# Patient Record
Sex: FEMALE | Race: BLACK OR AFRICAN AMERICAN | Employment: UNEMPLOYED | URBAN - NONMETROPOLITAN AREA
[De-identification: names, ages, dates, MRNs, and addresses within clinical notes are randomized per-mention and may not be internally consistent; named-entity substitution may affect disease eponyms.]

---
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
## 2020-06-29 ENCOUNTER — HOSPITAL ENCOUNTER (EMERGENCY)
Age: 20
Discharge: HOME | End: 2020-06-29
Attending: EMERGENCY MEDICINE
Payer: COMMERCIAL

## 2020-06-29 VITALS
DIASTOLIC BLOOD PRESSURE: 69 MMHG | TEMPERATURE: 98.5 F | OXYGEN SATURATION: 99 % | BODY MASS INDEX: 45 KG/M2 | WEIGHT: 280 LBS | RESPIRATION RATE: 16 BRPM | HEART RATE: 77 BPM | SYSTOLIC BLOOD PRESSURE: 109 MMHG | HEIGHT: 66 IN

## 2020-06-29 LAB
ALBUMIN SERPL-MCNC: 4.3 G/DL (ref 3.5–5.1)
ALP BLD-CCNC: 37 U/L (ref 38–126)
ALT SERPL-CCNC: 23 U/L (ref 11–66)
AMPHETAMINE+METHAMPHETAMINE URINE SCREEN: NEGATIVE
ANION GAP SERPL CALCULATED.3IONS-SCNC: 13 MEQ/L (ref 8–16)
AST SERPL-CCNC: 17 U/L (ref 5–40)
BACTERIA: ABNORMAL /HPF
BARBITURATE QUANTITATIVE URINE: NEGATIVE
BASOPHILS # BLD: 0.2 %
BASOPHILS ABSOLUTE: 0 THOU/MM3 (ref 0–0.1)
BENZODIAZEPINE QUANTITATIVE URINE: NEGATIVE
BILIRUB SERPL-MCNC: 0.2 MG/DL (ref 0.3–1.2)
BILIRUBIN DIRECT: < 0.2 MG/DL (ref 0–0.3)
BILIRUBIN URINE: NEGATIVE
BLOOD, URINE: NEGATIVE
BUN BLDV-MCNC: 12 MG/DL (ref 7–22)
CALCIUM SERPL-MCNC: 9.3 MG/DL (ref 8.5–10.5)
CANNABINOID QUANTITATIVE URINE: POSITIVE
CASTS 2: ABNORMAL /LPF
CASTS UA: ABNORMAL /LPF
CHARACTER, URINE: CLEAR
CHLORIDE BLD-SCNC: 107 MEQ/L (ref 98–111)
CO2: 21 MEQ/L (ref 23–33)
COCAINE METABOLITE QUANTITATIVE URINE: NEGATIVE
COLOR: YELLOW
CREAT SERPL-MCNC: 0.8 MG/DL (ref 0.4–1.2)
CRYSTALS, UA: ABNORMAL
EOSINOPHIL # BLD: 1 %
EOSINOPHILS ABSOLUTE: 0.1 THOU/MM3 (ref 0–0.4)
EPITHELIAL CELLS, UA: ABNORMAL /HPF
ERYTHROCYTE [DISTWIDTH] IN BLOOD BY AUTOMATED COUNT: 12.2 % (ref 11.5–14.5)
ERYTHROCYTE [DISTWIDTH] IN BLOOD BY AUTOMATED COUNT: 42.9 FL (ref 35–45)
ETHYL ALCOHOL, SERUM: < 0.01 %
GFR SERPL CREATININE-BSD FRML MDRD: > 90 ML/MIN/1.73M2
GLUCOSE BLD-MCNC: 74 MG/DL (ref 70–108)
GLUCOSE URINE: NEGATIVE MG/DL
HCG,BETA SUBUNIT,QUAL,SERUM: 5093 MIU/ML (ref 0–5)
HCT VFR BLD CALC: 40.3 % (ref 37–47)
HEMOGLOBIN: 12.5 GM/DL (ref 12–16)
IMMATURE GRANS (ABS): 0.03 THOU/MM3 (ref 0–0.07)
IMMATURE GRANULOCYTES: 0.3 %
KETONES, URINE: NEGATIVE
LEUKOCYTE ESTERASE, URINE: ABNORMAL
LIPASE: 15.6 U/L (ref 5.6–51.3)
LYMPHOCYTES # BLD: 31.2 %
LYMPHOCYTES ABSOLUTE: 3.3 THOU/MM3 (ref 1–4.8)
MAGNESIUM: 2.1 MG/DL (ref 1.6–2.4)
MCH RBC QN AUTO: 29.8 PG (ref 26–33)
MCHC RBC AUTO-ENTMCNC: 31 GM/DL (ref 32.2–35.5)
MCV RBC AUTO: 96.2 FL (ref 81–99)
MISCELLANEOUS 2: ABNORMAL
MONOCYTES # BLD: 12.4 %
MONOCYTES ABSOLUTE: 1.3 THOU/MM3 (ref 0.4–1.3)
NITRITE, URINE: NEGATIVE
NUCLEATED RED BLOOD CELLS: 0 /100 WBC
OPIATES, URINE: NEGATIVE
OSMOLALITY CALCULATION: 279.7 MOSMOL/KG (ref 275–300)
OXYCODONE: NEGATIVE
PH UA: 6 (ref 5–9)
PHENCYCLIDINE QUANTITATIVE URINE: NEGATIVE
PLATELET # BLD: 272 THOU/MM3 (ref 130–400)
PMV BLD AUTO: 10.3 FL (ref 9.4–12.4)
POTASSIUM SERPL-SCNC: 3.5 MEQ/L (ref 3.5–5.2)
PREGNANCY, SERUM: POSITIVE
PROTEIN UA: NEGATIVE
RBC # BLD: 4.19 MILL/MM3 (ref 4.2–5.4)
RBC URINE: ABNORMAL /HPF
RENAL EPITHELIAL, UA: ABNORMAL
SEG NEUTROPHILS: 54.9 %
SEGMENTED NEUTROPHILS ABSOLUTE COUNT: 5.8 THOU/MM3 (ref 1.8–7.7)
SODIUM BLD-SCNC: 141 MEQ/L (ref 135–145)
SPECIFIC GRAVITY, URINE: 1.03 (ref 1–1.03)
TOTAL PROTEIN: 7.1 G/DL (ref 6.1–8)
UROBILINOGEN, URINE: 0.2 EU/DL (ref 0–1)
WBC # BLD: 10.5 THOU/MM3 (ref 4.8–10.8)
WBC UA: ABNORMAL /HPF
YEAST: ABNORMAL

## 2020-06-29 PROCEDURE — 84702 CHORIONIC GONADOTROPIN TEST: CPT

## 2020-06-29 PROCEDURE — 80307 DRUG TEST PRSMV CHEM ANLYZR: CPT

## 2020-06-29 PROCEDURE — 83735 ASSAY OF MAGNESIUM: CPT

## 2020-06-29 PROCEDURE — G0480 DRUG TEST DEF 1-7 CLASSES: HCPCS

## 2020-06-29 PROCEDURE — 81001 URINALYSIS AUTO W/SCOPE: CPT

## 2020-06-29 PROCEDURE — 85025 COMPLETE CBC W/AUTO DIFF WBC: CPT

## 2020-06-29 PROCEDURE — 99283 EMERGENCY DEPT VISIT LOW MDM: CPT

## 2020-06-29 PROCEDURE — 84703 CHORIONIC GONADOTROPIN ASSAY: CPT

## 2020-06-29 PROCEDURE — 83690 ASSAY OF LIPASE: CPT

## 2020-06-29 PROCEDURE — 36415 COLL VENOUS BLD VENIPUNCTURE: CPT

## 2020-06-29 PROCEDURE — 82248 BILIRUBIN DIRECT: CPT

## 2020-06-29 PROCEDURE — 80053 COMPREHEN METABOLIC PANEL: CPT

## 2020-06-29 RX ORDER — CEPHALEXIN 500 MG/1
500 CAPSULE ORAL 4 TIMES DAILY
Qty: 28 CAPSULE | Refills: 0 | Status: SHIPPED | OUTPATIENT
Start: 2020-06-29 | End: 2020-07-06

## 2020-06-29 RX ORDER — ONDANSETRON 4 MG/1
4 TABLET, ORALLY DISINTEGRATING ORAL ONCE
Status: DISCONTINUED | OUTPATIENT
Start: 2020-06-29 | End: 2020-06-30 | Stop reason: HOSPADM

## 2020-06-29 RX ORDER — PANTOPRAZOLE SODIUM 40 MG/1
40 TABLET, DELAYED RELEASE ORAL ONCE
Status: DISCONTINUED | OUTPATIENT
Start: 2020-06-29 | End: 2020-06-30 | Stop reason: HOSPADM

## 2020-06-29 RX ORDER — CEPHALEXIN 250 MG/1
500 CAPSULE ORAL ONCE
Status: DISCONTINUED | OUTPATIENT
Start: 2020-06-29 | End: 2020-06-30 | Stop reason: HOSPADM

## 2020-06-29 ASSESSMENT — ENCOUNTER SYMPTOMS
RHINORRHEA: 0
DIARRHEA: 0
EYE DISCHARGE: 0
EYE REDNESS: 0
PHOTOPHOBIA: 0
SINUS PRESSURE: 0
TROUBLE SWALLOWING: 0
ABDOMINAL DISTENTION: 0
ABDOMINAL PAIN: 1
WHEEZING: 0
EYE ITCHING: 0
SORE THROAT: 0
EYE PAIN: 0
VOICE CHANGE: 0
CONSTIPATION: 0
CHOKING: 0
BACK PAIN: 0
NAUSEA: 1
VOMITING: 0
BLOOD IN STOOL: 0
COUGH: 0
SHORTNESS OF BREATH: 0
CHEST TIGHTNESS: 0

## 2020-06-29 ASSESSMENT — PAIN SCALES - GENERAL
PAINLEVEL_OUTOF10: 8
PAINLEVEL_OUTOF10: 6

## 2020-06-29 ASSESSMENT — PAIN DESCRIPTION - LOCATION: LOCATION: ABDOMEN

## 2020-06-29 ASSESSMENT — PAIN DESCRIPTION - PAIN TYPE: TYPE: ACUTE PAIN

## 2020-06-30 ASSESSMENT — ENCOUNTER SYMPTOMS
CHOKING: 0
TROUBLE SWALLOWING: 0
CHEST TIGHTNESS: 0
NAUSEA: 1
BACK PAIN: 0
DIARRHEA: 0
ABDOMINAL DISTENTION: 0
BLOOD IN STOOL: 0
EYE DISCHARGE: 0
VOICE CHANGE: 0
EYE ITCHING: 0
PHOTOPHOBIA: 0
VOMITING: 0
EYE PAIN: 0
ABDOMINAL PAIN: 1
SORE THROAT: 0
EYE REDNESS: 0
WHEEZING: 0
RHINORRHEA: 0
CONSTIPATION: 0
COUGH: 0
SINUS PRESSURE: 0
SHORTNESS OF BREATH: 0

## 2020-06-30 NOTE — ED PROVIDER NOTES
[address]          CHIEF COMPLAINT       Chief Complaint   Patient presents with    Abdominal Pain    Nausea       Nurses Notes reviewed and I agree except as noted inthe HPI. HISTORY OF PRESENT ILLNESS    Quita Lennon is a 21 y.o. female who presents to the Emergency Department for the evaluation of abdominal pain and nausea. Patient reports abdominal cramping and intermittent \"tightness\" that she rates as a 6/10 in severity. She is also experiencing nausea, urinary frequency, and dark colored urine. She denies emesis, vaginal discharge, hematuria, or hematochezia. Patient notes that she is late for her menstrual period and that there is a chance of pregnancy. Past medical history of polycystic ovary disease. No further complaints at initial encounter. The HPI was provided by the patient. REVIEW OF SYSTEMS     Review of Systems   Constitutional: Negative for activity change, appetite change, diaphoresis, fatigue and unexpected weight change. HENT: Negative for congestion, ear discharge, ear pain, hearing loss, rhinorrhea, sinus pressure, sore throat, trouble swallowing and voice change. Eyes: Negative for photophobia, pain, discharge, redness and itching. Respiratory: Negative for cough, choking, chest tightness, shortness of breath and wheezing. Cardiovascular: Negative for chest pain, palpitations and leg swelling. Gastrointestinal: Positive for abdominal pain and nausea. Negative for abdominal distention, blood in stool, constipation, diarrhea and vomiting. Endocrine: Negative for polydipsia, polyphagia and polyuria. Genitourinary: Positive for frequency. Negative for decreased urine volume, difficulty urinating, dysuria, enuresis, hematuria and urgency. Dark colored urine   Musculoskeletal: Negative for arthralgias, back pain, gait problem, myalgias, neck pain and neck stiffness. Skin: Negative for pallor and rash. External ear normal.      Left Ear: External ear normal.      Nose: Nose normal.      Mouth/Throat:      Pharynx: No oropharyngeal exudate. Eyes:      General: No scleral icterus. Right eye: No discharge. Left eye: No discharge. Conjunctiva/sclera: Conjunctivae normal.      Pupils: Pupils are equal, round, and reactive to light. Neck:      Musculoskeletal: Normal range of motion and neck supple. Thyroid: No thyromegaly. Vascular: No JVD. Trachea: No tracheal deviation. Cardiovascular:      Rate and Rhythm: Normal rate and regular rhythm. Heart sounds: Normal heart sounds, S1 normal and S2 normal. No murmur. No friction rub. No gallop. Pulmonary:      Effort: Pulmonary effort is normal.      Breath sounds: Normal breath sounds. No stridor. No wheezing, rhonchi or rales. Chest:      Chest wall: No tenderness. Abdominal:      General: Bowel sounds are normal. There is no distension. Palpations: Abdomen is soft. There is no mass. Tenderness: There is no abdominal tenderness. There is no guarding or rebound. Hernia: No hernia is present. Musculoskeletal: Normal range of motion. General: No tenderness. Lymphadenopathy:      Cervical: No cervical adenopathy. Skin:     General: Skin is warm and dry. Findings: No bruising, ecchymosis, lesion or rash. Neurological:      Mental Status: She is alert and oriented to person, place, and time. Cranial Nerves: No cranial nerve deficit. Coordination: Coordination normal.      Deep Tendon Reflexes: Reflexes are normal and symmetric. Psychiatric:         Speech: Speech normal.         Behavior: Behavior normal.         Thought Content:  Thought content normal.         Judgment: Judgment normal.         DIFFERENTIAL DIAGNOSIS:   As discussed extensively at bedside with the patient, including but not limited to pregnancy, UTI, viral illness, gastritis     RESULTS     EKG: All EKG's are interpreted by the Emergency Department Physician who either signs or Co-signs this chart in the absence of acardiologist.    None     RADIOLOGY: non-plain film images(s) such as CT, Ultrasound and MRI are read by the radiologist.    No orders to display       LABS:   Labs Reviewed   CBC WITH AUTO DIFFERENTIAL - Abnormal; Notable for the following components:       Result Value    RBC 4.19 (*)     MCHC 31.0 (*)     All other components within normal limits   BASIC METABOLIC PANEL - Abnormal; Notable for the following components:    CO2 21 (*)     All other components within normal limits   HEPATIC FUNCTION PANEL - Abnormal; Notable for the following components: Total Bilirubin 0.2 (*)     Alkaline Phosphatase 37 (*)     All other components within normal limits   URINE WITH REFLEXED MICRO - Abnormal; Notable for the following components:    Leukocyte Esterase, Urine TRACE (*)     All other components within normal limits   HCG, QUANTITATIVE, PREGNANCY - Abnormal; Notable for the following components:    hCG,Beta Subunit,Qual,Serum 5093.0 (*)     All other components within normal limits   LIPASE   MAGNESIUM   HCG, SERUM, QUALITATIVE   ETHANOL   URINE DRUG SCREEN   ANION GAP   GLOMERULAR FILTRATION RATE, ESTIMATED   OSMOLALITY       EMERGENCY DEPARTMENT COURSE:   Vitals:    Vitals:    06/29/20 2136 06/29/20 2230   BP: (!) 154/84 109/69   Pulse: 89 77   Resp: 18 16   Temp: 98.5 °F (36.9 °C)    TempSrc: Oral    SpO2: 99% 99%   Weight: 280 lb (127 kg)    Height: 5' 6\" (1.676 m)      9:38 PM EDT: The patient was seen and evaluated. Appropriate orders placed. Patient has very vague complaints today. There is no specific area pain. She has some nausea without vomiting. She is hemodynamically stable. Her urine today shows she has a urinary tract infection. Lab work came back. Apparently patient is now pregnant. Patient denies any vaginal bleeding or discharge. She has never seen an OB/GYN before.   She is never

## 2020-06-30 NOTE — ED NOTES
Pt resting on cot with significant other at bedside. Pt urine sample obtained and sent to lab at this time.       Demetrius ESCALONA RN  06/29/20 6968

## 2020-06-30 NOTE — ED NOTES
PT denies any need for medication at this time. States pain is tolerable and no nausea at this time.       Jayleen Soto RN  06/29/20 2113

## 2020-10-06 ENCOUNTER — HOSPITAL ENCOUNTER (EMERGENCY)
Age: 20
Discharge: HOME | End: 2020-10-06
Payer: COMMERCIAL

## 2020-10-06 VITALS
HEIGHT: 66 IN | HEART RATE: 95 BPM | DIASTOLIC BLOOD PRESSURE: 94 MMHG | BODY MASS INDEX: 47.09 KG/M2 | SYSTOLIC BLOOD PRESSURE: 135 MMHG | OXYGEN SATURATION: 98 % | TEMPERATURE: 97.5 F | WEIGHT: 293 LBS | RESPIRATION RATE: 16 BRPM

## 2020-10-06 LAB
GROUP A STREP CULTURE, REFLEX: NEGATIVE
REFLEX THROAT C + S: NORMAL

## 2020-10-06 PROCEDURE — 87880 STREP A ASSAY W/OPTIC: CPT

## 2020-10-06 PROCEDURE — 99213 OFFICE O/P EST LOW 20 MIN: CPT

## 2020-10-06 PROCEDURE — 99202 OFFICE O/P NEW SF 15 MIN: CPT | Performed by: NURSE PRACTITIONER

## 2020-10-06 PROCEDURE — 87070 CULTURE OTHR SPECIMN AEROBIC: CPT

## 2020-10-06 ASSESSMENT — PAIN DESCRIPTION - DESCRIPTORS: DESCRIPTORS: DISCOMFORT

## 2020-10-06 ASSESSMENT — PAIN DESCRIPTION - LOCATION: LOCATION: THROAT

## 2020-10-06 ASSESSMENT — PAIN SCALES - WONG BAKER: WONGBAKER_NUMERICALRESPONSE: 6

## 2020-10-06 ASSESSMENT — PAIN - FUNCTIONAL ASSESSMENT: PAIN_FUNCTIONAL_ASSESSMENT: PREVENTS OR INTERFERES SOME ACTIVE ACTIVITIES AND ADLS

## 2020-10-06 ASSESSMENT — ENCOUNTER SYMPTOMS
RHINORRHEA: 0
COUGH: 0
NAUSEA: 0
SHORTNESS OF BREATH: 0

## 2020-10-06 ASSESSMENT — PAIN SCALES - GENERAL: PAINLEVEL_OUTOF10: 6

## 2020-10-06 ASSESSMENT — PAIN DESCRIPTION - PAIN TYPE: TYPE: ACUTE PAIN

## 2020-10-06 NOTE — ED PROVIDER NOTES
[facility]       CHIEF COMPLAINT       Chief Complaint   Patient presents with    Pharyngitis     Nurses Notes reviewed and I agree except as noted in the HPI. HISTORY OF PRESENT ILLNESS   Garret Romero is a 21 y.o. female who presents with complaints of red bumps on her tongue started 5 days ago. She states a sore throat began yesterday. The history is provided by the patient. Pharyngitis   Location:  Generalized  Quality:  Aching and sore  Severity:  Mild  Onset quality:  Sudden  Duration:  1 day  Timing:  Constant  Progression:  Unchanged  Chronicity:  New  Relieved by:  None tried  Worsened by:  Nothing  Ineffective treatments:  None tried  Associated symptoms: no chest pain, no chills, no cough, no fever, no headaches, no postnasal drip, no rhinorrhea and no shortness of breath    Risk factors: no exposure to strep and no sick contacts        REVIEW OF SYSTEMS     Review of Systems   Constitutional: Negative for chills and fever. HENT: Negative for postnasal drip and rhinorrhea. Respiratory: Negative for cough and shortness of breath. Cardiovascular: Negative for chest pain. Gastrointestinal: Negative for nausea. Musculoskeletal: Negative for arthralgias. Neurological: Negative for headaches. PAST MEDICAL HISTORY         Diagnosis Date    Polycystic ovary disease        SURGICALHISTORY     Patient  has a past surgical history that includes Tonsillectomy and adenoidectomy and cyst removal (Left). CURRENT MEDICATIONS       Previous Medications    DICLOFENAC (VOLTAREN) 75 MG EC TABLET    Take 1 tablet by mouth 2 times daily    ONDANSETRON (ZOFRAN) 4 MG TABLET    take 1 tablet by mouth every 12 hours if needed for nausea       ALLERGIES     Patient is has No Known Allergies. Patients   There is no immunization history on file for this patient. FAMILY HISTORY     Patient's family history is not on file.     SOCIAL HISTORY Patient  reports that she has never smoked. She has never used smokeless tobacco. She reports that she does not drink alcohol or use drugs. PHYSICAL EXAM     ED TRIAGE VITALS  BP: (!) 135/94, Temp: 97.5 °F (36.4 °C), Pulse: 95, Resp: 16, SpO2: 98 %,Estimated body mass index is 53.75 kg/m² as calculated from the following:    Height as of this encounter: 5' 6\" (1.676 m). Weight as of this encounter: 333 lb (151 kg). ,No LMP recorded (approximate). Patient is pregnant. Physical Exam  Vitals signs and nursing note reviewed. Constitutional:       General: She is not in acute distress. Appearance: Normal appearance. HENT:      Head: Normocephalic. Right Ear: Tympanic membrane and ear canal normal. No middle ear effusion. Left Ear: Ear canal normal. A middle ear effusion is present. Nose: Nose normal. No congestion. Mouth/Throat:      Mouth: Mucous membranes are moist.      Pharynx: Posterior oropharyngeal erythema present. Tonsils: No tonsillar exudate or tonsillar abscesses. 1+ on the right. 1+ on the left. Neck:      Musculoskeletal: Normal range of motion. Cardiovascular:      Rate and Rhythm: Normal rate and regular rhythm. Heart sounds: Normal heart sounds. Pulmonary:      Effort: Pulmonary effort is normal.      Breath sounds: Normal breath sounds. Musculoskeletal: Normal range of motion. Lymphadenopathy:      Cervical: No cervical adenopathy. Skin:     General: Skin is warm and dry. Neurological:      Mental Status: She is alert and oriented to person, place, and time.    Psychiatric:         Behavior: Behavior normal.       DIAGNOSTIC RESULTS     Labs:  Results for orders placed or performed during the hospital encounter of 10/06/20   Strep A culture, throat    Specimen: Throat   Result Value Ref Range    REFLEX THROAT C + S INDICATED    STREP A ANTIGEN   Result Value Ref Range    GROUP A STREP CULTURE, REFLEX Negative IMAGING:  none    EKG:  none    URGENT CARE COURSE:     Vitals:    10/06/20 1658   BP: (!) 135/94   Pulse: 95   Resp: 16   Temp: 97.5 °F (36.4 °C)   TempSrc: Temporal   SpO2: 98%   Weight: (!) 333 lb (151 kg)   Height: 5' 6\" (1.676 m)       Medications - No data to display         PROCEDURES:  None    FINAL IMPRESSION      1. Viral pharyngitis    2. Thrush      DISPOSITION/ PLAN   DISPOSITION Decision To Discharge 10/06/2020 05:14:48 PM     Rapid strep a test negative for acute bacterial infection. Possible tongue candidal infection. Nystatin for 5 days. Okay during pregnancy. Inform patient of negative test result. She was instructed she may take over-the-counter acetaminophen for pain relief. She should encourage fluid intake. Instructed to follow-up with PCP if symptoms worsen or fail to improve including fever. She voiced understanding. Patient was discharged in stable condition.     PATIENT REFERRED TO:  KAL Chopra CNP  [id number] [employer] [id number]      DISCHARGE MEDICATIONS:  New Prescriptions    NYSTATIN (MYCOSTATIN) 408720 UNIT/ML SUSPENSION    Take 5 mLs by mouth 4 times daily for 5 days Retain in mouth as long as possible       Discontinued Medications    METRONIDAZOLE (FLAGYL) 500 MG TABLET    take 1 tablet by mouth every 12 hours take with food --NO ALCOHOL AND EVEN FOR 3 DAYS AFTER--       Current Discharge Medication List          KAL Adams CNP    (Please note that portions of this note were completed with a voice recognition program. Efforts were made to edit the dictations but occasionally words are mis-transcribed.)           KAL Adams CNP  10/06/20 4307

## 2020-10-06 NOTE — ED TRIAGE NOTES
Patient ambulated to room with complaint of sores on tongue that started 5 days ago and sore throat that started today

## 2020-10-08 LAB — THROAT/NOSE CULTURE: NORMAL

## 2020-11-18 ENCOUNTER — HOSPITAL ENCOUNTER (OUTPATIENT)
Age: 20
Setting detail: SPECIMEN
Discharge: HOME | End: 2020-11-18
Payer: COMMERCIAL

## 2020-11-23 LAB
CULTURE: ABNORMAL
DIRECT EXAM: ABNORMAL
Lab: ABNORMAL
SPECIMEN DESCRIPTION: ABNORMAL

## 2021-05-17 ENCOUNTER — HOSPITAL ENCOUNTER (OUTPATIENT)
Age: 21
Setting detail: SPECIMEN
Discharge: HOME | End: 2021-05-17
Payer: COMMERCIAL

## 2021-05-17 LAB
DIRECT EXAM: NORMAL
Lab: NORMAL
SPECIMEN DESCRIPTION: NORMAL

## 2021-05-18 LAB
C. TRACHOMATIS DNA ,URINE: NEGATIVE
CULTURE: NORMAL
Lab: NORMAL
N. GONORRHOEAE DNA, URINE: NEGATIVE
SOURCE: NORMAL
SPECIMEN DESCRIPTION: NORMAL
SPECIMEN DESCRIPTION: NORMAL
TRICHOMONAS VAGINALI, MOLECULAR: NEGATIVE

## 2021-09-16 ENCOUNTER — HOSPITAL ENCOUNTER (EMERGENCY)
Age: 21
Discharge: HOME | End: 2021-09-16
Payer: COMMERCIAL

## 2021-09-16 VITALS
SYSTOLIC BLOOD PRESSURE: 160 MMHG | HEIGHT: 66 IN | HEART RATE: 82 BPM | WEIGHT: 293 LBS | TEMPERATURE: 98.9 F | RESPIRATION RATE: 16 BRPM | DIASTOLIC BLOOD PRESSURE: 99 MMHG | OXYGEN SATURATION: 94 % | BODY MASS INDEX: 47.09 KG/M2

## 2021-09-16 DIAGNOSIS — Z20.822 LAB TEST NEGATIVE FOR COVID-19 VIRUS: ICD-10-CM

## 2021-09-16 DIAGNOSIS — R03.0 ELEVATED BLOOD PRESSURE READING: ICD-10-CM

## 2021-09-16 DIAGNOSIS — Z20.822 CONTACT WITH AND (SUSPECTED) EXPOSURE TO COVID-19: Primary | ICD-10-CM

## 2021-09-16 LAB
GROUP A STREP CULTURE, REFLEX: NEGATIVE
REFLEX THROAT C + S: NORMAL
SARS-COV-2, NAAT: NOT DETECTED

## 2021-09-16 PROCEDURE — 87880 STREP A ASSAY W/OPTIC: CPT

## 2021-09-16 PROCEDURE — 99282 EMERGENCY DEPT VISIT SF MDM: CPT

## 2021-09-16 PROCEDURE — 87070 CULTURE OTHR SPECIMN AEROBIC: CPT

## 2021-09-16 PROCEDURE — 87635 SARS-COV-2 COVID-19 AMP PRB: CPT

## 2021-09-16 NOTE — LETTER
[facility]   [place], [address]          PROOF OF PRESENCE      To Whom It May Concern:    Ariel Abel was present in the Emergency Department at [facility], [place], operated by [facility] on 9/16/21. She had negative lab test for Covid but due to exposure to Covid and current symptoms, it would be recommended that she continue to quarantine until repeat negative test, ideally in 2-3 days.                               Sincerely,        Lacy Art PA-C

## 2021-09-18 LAB — THROAT/NOSE CULTURE: NORMAL

## 2021-09-18 ASSESSMENT — ENCOUNTER SYMPTOMS
RHINORRHEA: 1
COUGH: 1
SORE THROAT: 1
VOICE CHANGE: 1

## 2021-09-18 NOTE — ED PROVIDER NOTES
[facility]  eMERGENCY dEPARTMENT eNCOUnter          CHIEF COMPLAINT       Chief Complaint   Patient presents with    Covid Testing       Nurses Notes reviewed and I agree except as noted inthe HPI. HISTORY OF PRESENT ILLNESS    Linn Feldman is a 24 y.o. female who presents to the Emergency Department for the evaluation of Covid testing. Patient's significant other had family member recently test positive for Covid and he has been around that family member and symptomatic for the past couple of days. Patient states that for the past 2 days she is also noticed some hoarseness, sore throat, rhinorrhea, congestion and cough as well as diarrhea. States she has had loss of taste as well with smell intact. Her sore throat, congestion and rhinorrhea have been improving. She denies any worsening of symptoms. Denies any associated fevers, vomiting, chest pain, shortness of breath. She has not tried anything at home for treatment of her symptoms. Denies any possibility of pregnancy and states she is not breast-feeding. No history of lung disease and she is not a smoker. The HPI was provided by the patient. REVIEW OF SYSTEMS     Review of Systems   HENT: Positive for congestion, rhinorrhea, sore throat and voice change. Respiratory: Positive for cough. All other systems reviewed and are negative. PAST MEDICAL HISTORY    has a past medical history of Polycystic ovary disease. SURGICAL HISTORY      has a past surgical history that includes Tonsillectomy and adenoidectomy and cyst removal (Left).     CURRENT MEDICATIONS       Discharge Medication List as of 9/16/2021 11:27 AM      CONTINUE these medications which have NOT CHANGED    Details   diclofenac (VOLTAREN) 75 MG EC tablet Take 1 tablet by mouth 2 times daily, Disp-20 tablet, R-0Print      ondansetron (ZOFRAN) 4 MG tablet take 1 tablet by mouth every 12 hours if needed for nausea, R-0Historical Med ALLERGIES     has No Known Allergies. FAMILY HISTORY     She indicated that her mother is alive. She indicated that her father is alive. family history is not on file. SOCIAL HISTORY      reports that she has never smoked. She has never used smokeless tobacco. She reports that she does not drink alcohol and does not use drugs. PHYSICAL EXAM     INITIAL VITALS:  height is 5' 6\" (1.676 m) and weight is 350 lb (158.8 kg) (abnormal). Her oral temperature is 98.9 °F (37.2 °C). Her blood pressure is 160/99 (abnormal) and her pulse is 82. Her respiration is 16 and oxygen saturation is 94%. Physical Exam  Vitals and nursing note reviewed. Constitutional:       Appearance: Normal appearance. HENT:      Head: Normocephalic and atraumatic. Nose: Nose normal.      Mouth/Throat:      Mouth: Mucous membranes are moist.      Pharynx: No oropharyngeal exudate or posterior oropharyngeal erythema. Eyes:      Conjunctiva/sclera: Conjunctivae normal.   Cardiovascular:      Rate and Rhythm: Normal rate and regular rhythm. Heart sounds: Normal heart sounds. No murmur heard. Pulmonary:      Effort: Pulmonary effort is normal. No respiratory distress. Breath sounds: Normal breath sounds. No wheezing. Abdominal:      Palpations: Abdomen is soft. Tenderness: There is no abdominal tenderness. There is no guarding. Musculoskeletal:      Cervical back: Normal range of motion. Skin:     General: Skin is warm and dry. Neurological:      General: No focal deficit present. Mental Status: She is alert and oriented to person, place, and time.    Psychiatric:         Mood and Affect: Mood normal.         DIFFERENTIAL DIAGNOSIS:   Differential diagnoses are discussed    DIAGNOSTIC RESULTS     EKG: All EKG's are interpreted by the Emergency Department Physician who either signs or Co-signsthis chart in the absence of a cardiologist.        RADIOLOGY: non-plain film images(s) such as CT, Ultrasound and MRI are read by the radiologist.    No orders to display       LABS:      Labs Reviewed   COVID-19, RAPID   CULTURE, THROAT    Narrative:     Source: throat       Site:           Current Antibiotics: not stated   GROUP A STREP, REFLEX       EMERGENCY DEPARTMENT COURSE:   Vitals:    Vitals:    09/16/21 1023   BP: (!) 160/99   Pulse: 82   Resp: 16   Temp: 98.9 °F (37.2 °C)   TempSrc: Oral   SpO2: 94%   Weight: (!) 350 lb (158.8 kg)   Height: 5' 6\" (1.676 m)      6:40 PM EDT: The patient was seen and evaluated. Patient arrives for complaints of rhinorrhea, congestion, sore throat, cough after exposure to significant other with similar symptoms. Reports indirect exposure to Covid and she is unvaccinated. She has hypertension noted here with otherwise reassuring vital signs. Strep and Covid screening were negative. She denied any need for treatment. She feels comfortable with discharge home with continued monitoring. CDC guidelines for quarantine secondary to exposure were discussed though the patient has not had known direct exposure. Return precautions were discussed with the patient. Follow-up advised due to elevated blood pressure reading and she is provided with contact information for the family medicine clinic. CRITICAL CARE:   None    CONSULTS:  None    PROCEDURES:  None    FINAL IMPRESSION      1. Contact with and (suspected) exposure to covid-19    2. Lab test negative for COVID-19 virus    3. Elevated blood pressure reading          DISPOSITION/PLAN   Discharge    PATIENT REFERRED TO:  [address] [place] [address],[address]  [address] [facility]  [phone]  In 1 week  for recheck of blood pressure    [facility] EMERGENCY DEPT  [address]  [phone]    If symptoms worsen      DISCHARGEMEDICATIONS:  Discharge Medication List as of 9/16/2021 11:27 AM          (Please note that portions of this note were completedwith a voice recognition program.  Efforts were made to edit the dictations but occasionally words are mis-transcribed.)        Pradeep Patel PA-C  09/18/21 5112

## 2021-09-29 ENCOUNTER — HOSPITAL ENCOUNTER (EMERGENCY)
Age: 21
Discharge: HOME | End: 2021-09-29
Payer: COMMERCIAL

## 2021-09-29 VITALS
OXYGEN SATURATION: 98 % | RESPIRATION RATE: 16 BRPM | DIASTOLIC BLOOD PRESSURE: 101 MMHG | HEART RATE: 108 BPM | TEMPERATURE: 99.4 F | SYSTOLIC BLOOD PRESSURE: 153 MMHG

## 2021-09-29 DIAGNOSIS — Z20.822 SUSPECTED COVID-19 VIRUS INFECTION: Primary | ICD-10-CM

## 2021-09-29 DIAGNOSIS — R51.9 ACUTE NONINTRACTABLE HEADACHE, UNSPECIFIED HEADACHE TYPE: ICD-10-CM

## 2021-09-29 PROCEDURE — U0003 INFECTIOUS AGENT DETECTION BY NUCLEIC ACID (DNA OR RNA); SEVERE ACUTE RESPIRATORY SYNDROME CORONAVIRUS 2 (SARS-COV-2) (CORONAVIRUS DISEASE [COVID-19]), AMPLIFIED PROBE TECHNIQUE, MAKING USE OF HIGH THROUGHPUT TECHNOLOGIES AS DESCRIBED BY CMS-2020-01-R: HCPCS

## 2021-09-29 PROCEDURE — 99281 EMR DPT VST MAYX REQ PHY/QHP: CPT

## 2021-09-29 PROCEDURE — 96372 THER/PROPH/DIAG INJ SC/IM: CPT

## 2021-09-29 PROCEDURE — 6360000002 HC RX W HCPCS: Performed by: NURSE PRACTITIONER

## 2021-09-29 RX ORDER — PROCHLORPERAZINE EDISYLATE 5 MG/ML
10 INJECTION INTRAMUSCULAR; INTRAVENOUS ONCE
Status: DISCONTINUED | OUTPATIENT
Start: 2021-09-29 | End: 2021-09-29

## 2021-09-29 RX ORDER — DIPHENHYDRAMINE HYDROCHLORIDE 50 MG/ML
25 INJECTION INTRAMUSCULAR; INTRAVENOUS ONCE
Status: COMPLETED | OUTPATIENT
Start: 2021-09-29 | End: 2021-09-29

## 2021-09-29 RX ORDER — KETOROLAC TROMETHAMINE 30 MG/ML
30 INJECTION, SOLUTION INTRAMUSCULAR; INTRAVENOUS ONCE
Status: COMPLETED | OUTPATIENT
Start: 2021-09-29 | End: 2021-09-29

## 2021-09-29 RX ORDER — PROMETHAZINE HYDROCHLORIDE 25 MG/ML
12.5 INJECTION, SOLUTION INTRAMUSCULAR; INTRAVENOUS ONCE
Status: COMPLETED | OUTPATIENT
Start: 2021-09-29 | End: 2021-09-29

## 2021-09-29 RX ADMIN — PROMETHAZINE HYDROCHLORIDE 12.5 MG: 25 INJECTION INTRAMUSCULAR; INTRAVENOUS at 19:42

## 2021-09-29 RX ADMIN — KETOROLAC TROMETHAMINE 30 MG: 30 INJECTION, SOLUTION INTRAMUSCULAR; INTRAVENOUS at 19:41

## 2021-09-29 RX ADMIN — DIPHENHYDRAMINE HYDROCHLORIDE 25 MG: 50 INJECTION, SOLUTION INTRAMUSCULAR; INTRAVENOUS at 19:41

## 2021-09-29 ASSESSMENT — PAIN SCALES - GENERAL: PAINLEVEL_OUTOF10: 9

## 2021-09-29 NOTE — ED TRIAGE NOTES
Pt presents to the ED via lobby requesting a COVID test. Pt states she has a headache and generalized body aches.

## 2021-10-01 ASSESSMENT — ENCOUNTER SYMPTOMS
NAUSEA: 1
EYE REDNESS: 0
SINUS PAIN: 1
VOMITING: 0
ABDOMINAL PAIN: 0
RHINORRHEA: 0
CHEST TIGHTNESS: 0
SORE THROAT: 1
COUGH: 0
BACK PAIN: 0

## 2021-10-01 NOTE — ED PROVIDER NOTES
[facility] Emergency Department    CHIEF COMPLAINT       Chief Complaint   Patient presents with    Covid Testing       Nurses Notes reviewed and I agree except as noted in the HPI. HISTORY OF PRESENT ILLNESS    Damien Golden jackie 24 y.o. female who presents to the ED for evaluation of headache, body aches, low grade fever and fatigue. She is concerned she has covid. Had a positive exposure. Headache is similar to previous but is severe. Has not taken anything for the symptoms. HPI was provided by the patient    REVIEW OF SYSTEMS     Review of Systems   Constitutional: Positive for activity change, chills, fatigue and fever. HENT: Positive for congestion, sinus pain and sore throat. Negative for ear discharge, ear pain, postnasal drip and rhinorrhea. Eyes: Negative for redness. Respiratory: Negative for cough and chest tightness. Cardiovascular: Negative for chest pain and leg swelling. Gastrointestinal: Positive for nausea. Negative for abdominal pain and vomiting. Genitourinary: Negative for difficulty urinating, dysuria, enuresis, flank pain and hematuria. Musculoskeletal: Positive for arthralgias and myalgias. Negative for back pain and joint swelling. Skin: Negative for rash. Neurological: Positive for headaches. Negative for dizziness, light-headedness and numbness. Psychiatric/Behavioral: Negative for agitation, behavioral problems and confusion. All other systems negative except as noted. PAST MEDICAL HISTORY     Past Medical History:   Diagnosis Date    Polycystic ovary disease        SURGICALHISTORY      has a past surgical history that includes Tonsillectomy and adenoidectomy and cyst removal (Left).     CURRENT MEDICATIONS       Discharge Medication List as of 9/29/2021  7:46 PM      CONTINUE these medications which have NOT CHANGED    Details   diclofenac (VOLTAREN) 75 MG EC tablet Take 1 tablet by mouth 2 times daily, Disp-20 tablet, R-0Print ondansetron (ZOFRAN) 4 MG tablet take 1 tablet by mouth every 12 hours if needed for nausea, R-0Historical Med             ALLERGIES     has No Known Allergies. FAMILY HISTORY     She indicated that her mother is alive. She indicated that her father is alive. family history is not on file. SOCIAL HISTORY       Social History     Socioeconomic History    Marital status: Single     Spouse name: Not on file    Number of children: Not on file    Years of education: Not on file    Highest education level: Not on file   Occupational History    Not on file   Tobacco Use    Smoking status: Never Smoker    Smokeless tobacco: Never Used   Vaping Use    Vaping Use: Never used   Substance and Sexual Activity    Alcohol use: No    Drug use: No    Sexual activity: Not on file   Other Topics Concern    Not on file   Social History Narrative    Not on file     Social Determinants of Health     Financial Resource Strain:     Difficulty of Paying Living Expenses:    Food Insecurity:     Worried About Running Out of Food in the Last Year:     [address] in the Last Year:    Transportation Needs:     Lack of Transportation (Medical):  Lack of Transportation (Non-Medical):    Physical Activity:     Days of Exercise per Week:     Minutes of Exercise per Session:    Stress:     Feeling of Stress :    Social Connections:     Frequency of Communication with Friends and Family:     Frequency of Social Gatherings with Friends and Family:     Attends Hoahaoism Services:     Active Member of Clubs or Organizations:     Attends Club or Organization Meetings:     Marital Status:    Intimate Partner Violence:     Fear of Current or Ex-Partner:     Emotionally Abused:     Physically Abused:     Sexually Abused:        PHYSICAL EXAM     INITIAL VITALS:  oral temperature is 99.4 °F (37.4 °C). Her blood pressure is 153/101 (abnormal) and her pulse is 108.  Her respiration is 16 and oxygen saturation is cardiologist.        RADIOLOGY: non-plainfilm images(s) such as CT, Ultrasound and MRI are read by the radiologist.  Plain radiographic images are visualized and preliminarily interpreted by the emergency physician unless otherwise stated below. No orders to display         LABS:   Labs Reviewed   COVID-19       EMERGENCY DEPARTMENT COURSE:   Vitals:    Vitals:    09/29/21 1719   BP: (!) 153/101   Pulse: 108   Resp: 16   Temp: 99.4 °F (37.4 °C)   TempSrc: Oral   SpO2: 98%                               MDM    Patient was seen in the ER for headache, body aches and fatigue. Appropriate labs and imaging are ordered and reviewed. Patient is treated with toradol, benadryl and phenergan. Headache improved. Patient is tested for covid and is advised she should quarantine until results are back. Discharged home with symptomatic support. Medications   ketorolac (TORADOL) injection 30 mg (30 mg IntraMUSCular Given 9/29/21 1941)   diphenhydrAMINE (BENADRYL) injection 25 mg (25 mg IntraMUSCular Given 9/29/21 1941)   promethazine (PHENERGAN) injection 12.5 mg (12.5 mg IntraMUSCular Given 9/29/21 1942)         Patient was seen independently by myself. The patient's final impression and disposition and plan was determined by myself. Strict return precautions and follow up instructions were discussed with the patient prior to discharge, with which the patient agrees. Physical assessment findings, diagnostic testing(s) if applicable, and vital signs reviewed with patient/patient representative. Questions answered. Medications asdirected, including OTC medications for supportive care. Education provided on medications. Differential diagnosis(s) discussed with patient/patient representative. Home care/self care instructions reviewed withpatient/patient representative. Patient is to follow-up with family care provider in 2-3 days if no improvement.   Patient is to go to the emergency department if symptoms worsen. Patient/patient representative isaware of care plan, questions answered, verbalizes understanding and is in agreement. CRITICAL CARE:   None    CONSULTS:  None    PROCEDURES:  None    FINAL IMPRESSION     1. Suspected COVID-19 virus infection    2. Acute nonintractable headache, unspecified headache type          DISPOSITION/PLAN   DISPOSITION Decision To Discharge 09/29/2021 07:21:45 PM      PATIENT REFERREDTO:  [address] [place] [address],[address]  [address] [place] [address]  Schedule an appointment as soon as possible for a visit in 2 days  For follow up      DISCHARGE MEDICATIONS:  Discharge Medication List as of 9/29/2021  7:46 PM          (Please note that portions of this note were completed with a voice recognition program.  Efforts were made to edit the dictations but occasionally words are mis-transcribed.)         KAL Guerrero CNP, APRN - CNP  10/01/21 3581

## 2021-10-02 LAB
SARS-COV-2: DETECTED
SOURCE: ABNORMAL

## 2021-11-23 ENCOUNTER — HOSPITAL ENCOUNTER (OUTPATIENT)
Age: 21
Setting detail: SPECIMEN
Discharge: HOME | End: 2021-11-23

## 2021-11-24 LAB
C. TRACHOMATIS DNA ,URINE: NEGATIVE
N. GONORRHOEAE DNA, URINE: NEGATIVE
SOURCE: NORMAL
SPECIMEN DESCRIPTION: NORMAL
TRICHOMONAS VAGINALI, MOLECULAR: NEGATIVE

## 2021-11-25 LAB
CULTURE: ABNORMAL
Lab: ABNORMAL
SPECIMEN DESCRIPTION: ABNORMAL

## 2021-12-14 ENCOUNTER — HOSPITAL ENCOUNTER (OUTPATIENT)
Age: 21
Setting detail: SPECIMEN
Discharge: HOME | End: 2021-12-14

## 2021-12-14 LAB
DIRECT EXAM: ABNORMAL
Lab: ABNORMAL
SPECIMEN DESCRIPTION: ABNORMAL

## 2021-12-16 LAB
CULTURE: ABNORMAL
Lab: ABNORMAL
SPECIMEN DESCRIPTION: ABNORMAL

## 2021-12-22 ENCOUNTER — HOSPITAL ENCOUNTER (OUTPATIENT)
Age: 21
Setting detail: SPECIMEN
Discharge: HOME | End: 2021-12-22

## 2021-12-22 LAB
ABSOLUTE EOS #: 0.06 K/UL (ref 0–0.44)
ABSOLUTE IMMATURE GRANULOCYTE: <0.03 K/UL (ref 0–0.3)
ABSOLUTE LYMPH #: 3.32 K/UL (ref 1.1–3.7)
ABSOLUTE MONO #: 0.69 K/UL (ref 0.1–1.4)
ALBUMIN SERPL-MCNC: 4.2 G/DL (ref 3.5–5.2)
ALBUMIN/GLOBULIN RATIO: 1.4 (ref 1–2.5)
ALP BLD-CCNC: 44 U/L (ref 35–104)
ALT SERPL-CCNC: 15 U/L (ref 5–33)
ANION GAP SERPL CALCULATED.3IONS-SCNC: 13 MMOL/L (ref 9–17)
AST SERPL-CCNC: 13 U/L
BASOPHILS # BLD: 0 % (ref 0–2)
BASOPHILS ABSOLUTE: <0.03 K/UL (ref 0–0.2)
BILIRUB SERPL-MCNC: 0.18 MG/DL (ref 0.3–1.2)
BUN BLDV-MCNC: 11 MG/DL (ref 6–20)
BUN/CREAT BLD: ABNORMAL (ref 9–20)
CALCIUM SERPL-MCNC: 9.4 MG/DL (ref 8.6–10.4)
CHLORIDE BLD-SCNC: 105 MMOL/L (ref 98–107)
CHOLESTEROL/HDL RATIO: 3.8
CHOLESTEROL: 182 MG/DL
CO2: 22 MMOL/L (ref 20–31)
CREAT SERPL-MCNC: 0.75 MG/DL (ref 0.5–0.9)
DIFFERENTIAL TYPE: NORMAL
EOSINOPHILS RELATIVE PERCENT: 1 % (ref 1–4)
GFR AFRICAN AMERICAN: >60 ML/MIN
GFR NON-AFRICAN AMERICAN: >60 ML/MIN
GFR SERPL CREATININE-BSD FRML MDRD: ABNORMAL ML/MIN/{1.73_M2}
GFR SERPL CREATININE-BSD FRML MDRD: ABNORMAL ML/MIN/{1.73_M2}
GLUCOSE BLD-MCNC: 81 MG/DL (ref 70–99)
HCT VFR BLD CALC: 41.7 % (ref 36.3–47.1)
HDLC SERPL-MCNC: 48 MG/DL
HEMOGLOBIN: 13.2 G/DL (ref 11.9–15.1)
IMMATURE GRANULOCYTES: 0 %
INSULIN COMMENT: NORMAL
INSULIN REFERENCE RANGE:: NORMAL
INSULIN: 33.1 MU/L
LDL CHOLESTEROL: 119 MG/DL (ref 0–130)
LYMPHOCYTES # BLD: 39 % (ref 25–45)
MCH RBC QN AUTO: 28.8 PG (ref 25.2–33.5)
MCHC RBC AUTO-ENTMCNC: 31.7 G/DL (ref 28.4–34.8)
MCV RBC AUTO: 91 FL (ref 82.6–102.9)
MONOCYTES # BLD: 8 % (ref 2–8)
NRBC AUTOMATED: 0 PER 100 WBC
PDW BLD-RTO: 12.1 % (ref 11.8–14.4)
PLATELET # BLD: 310 K/UL (ref 138–453)
PLATELET ESTIMATE: NORMAL
PMV BLD AUTO: 10.5 FL (ref 8.1–13.5)
POTASSIUM SERPL-SCNC: 4.2 MMOL/L (ref 3.7–5.3)
RBC # BLD: 4.58 M/UL (ref 3.95–5.11)
RBC # BLD: NORMAL 10*6/UL
SEG NEUTROPHILS: 52 % (ref 34–64)
SEGMENTED NEUTROPHILS ABSOLUTE COUNT: 4.31 K/UL (ref 1.5–8.1)
SODIUM BLD-SCNC: 140 MMOL/L (ref 135–144)
TOTAL PROTEIN: 7.3 G/DL (ref 6.4–8.3)
TRIGL SERPL-MCNC: 74 MG/DL
TSH SERPL DL<=0.05 MIU/L-ACNC: 1.34 MIU/L (ref 0.3–5)
VITAMIN D 25-HYDROXY: 17 NG/ML (ref 30–100)
VLDLC SERPL CALC-MCNC: NORMAL MG/DL (ref 1–30)
WBC # BLD: 8.4 K/UL (ref 4.5–13.5)
WBC # BLD: NORMAL 10*3/UL

## 2021-12-25 ENCOUNTER — HOSPITAL ENCOUNTER (EMERGENCY)
Age: 21
Discharge: HOME | End: 2021-12-25
Attending: EMERGENCY MEDICINE
Payer: COMMERCIAL

## 2021-12-25 ENCOUNTER — APPOINTMENT (OUTPATIENT)
Dept: GENERAL RADIOLOGY | Age: 21
End: 2021-12-25
Payer: COMMERCIAL

## 2021-12-25 VITALS
RESPIRATION RATE: 18 BRPM | TEMPERATURE: 99.6 F | HEART RATE: 108 BPM | OXYGEN SATURATION: 97 % | SYSTOLIC BLOOD PRESSURE: 154 MMHG | DIASTOLIC BLOOD PRESSURE: 103 MMHG

## 2021-12-25 DIAGNOSIS — J10.1 INFLUENZA B: Primary | ICD-10-CM

## 2021-12-25 LAB
EKG ATRIAL RATE: 110 BPM
EKG P AXIS: 53 DEGREES
EKG P-R INTERVAL: 142 MS
EKG Q-T INTERVAL: 308 MS
EKG QRS DURATION: 76 MS
EKG QTC CALCULATION (BAZETT): 416 MS
EKG R AXIS: 21 DEGREES
EKG T AXIS: 21 DEGREES
EKG VENTRICULAR RATE: 110 BPM
FLU A ANTIGEN: NEGATIVE
FLU B ANTIGEN: POSITIVE
SARS-COV-2, NAAT: NOT  DETECTED

## 2021-12-25 PROCEDURE — 99282 EMERGENCY DEPT VISIT SF MDM: CPT

## 2021-12-25 PROCEDURE — 93010 ELECTROCARDIOGRAM REPORT: CPT | Performed by: INTERNAL MEDICINE

## 2021-12-25 PROCEDURE — 71045 X-RAY EXAM CHEST 1 VIEW: CPT

## 2021-12-25 PROCEDURE — 93005 ELECTROCARDIOGRAM TRACING: CPT | Performed by: EMERGENCY MEDICINE

## 2021-12-25 PROCEDURE — 87804 INFLUENZA ASSAY W/OPTIC: CPT

## 2021-12-25 PROCEDURE — 87635 SARS-COV-2 COVID-19 AMP PRB: CPT

## 2021-12-25 PROCEDURE — 6370000000 HC RX 637 (ALT 250 FOR IP): Performed by: EMERGENCY MEDICINE

## 2021-12-25 RX ORDER — IBUPROFEN 200 MG
400 TABLET ORAL ONCE
Status: COMPLETED | OUTPATIENT
Start: 2021-12-25 | End: 2021-12-25

## 2021-12-25 RX ADMIN — IBUPROFEN 400 MG: 200 TABLET, FILM COATED ORAL at 22:37

## 2021-12-25 ASSESSMENT — PAIN SCALES - GENERAL: PAINLEVEL_OUTOF10: 6

## 2021-12-26 ASSESSMENT — ENCOUNTER SYMPTOMS
BLOOD IN STOOL: 0
CHEST TIGHTNESS: 1
ABDOMINAL PAIN: 0
NAUSEA: 0
VOICE CHANGE: 0
COUGH: 1
VOMITING: 0
TROUBLE SWALLOWING: 0
BACK PAIN: 0
DIARRHEA: 0
SHORTNESS OF BREATH: 0

## 2021-12-26 NOTE — ED PROVIDER NOTES
[facility] [address] [address] EMERGENCY DEPT    EMERGENCY MEDICINE     Pt Name: Jacqueline Christie  MRN: [id number]  [place] 2000  Date of evaluation: 12/25/2021  Provider: Christine Eagle DO, [address]       Chief Complaint   Patient presents with    Migraine    Cough    Pharyngitis       HISTORY OF PRESENT ILLNESS    Jacqueline Christie is a pleasant 24 y.o. female   Presents to the emergency department from home   Myalgias, nonproductiv cough, sore throat, ear pain, headache  Unsure abotu fevers  Symptoms x several days (4)  No SOB, no LE pain or swleling  Some chest tightness with cough      Triage notes and Nursing notes were reviewed by myself. Any discrepancies are addressed above. PAST MEDICAL HISTORY     Past Medical History:   Diagnosis Date    Polycystic ovary disease        SURGICAL HISTORY       Past Surgical History:   Procedure Laterality Date    CYST REMOVAL Left     wrist    TONSILLECTOMY AND ADENOIDECTOMY         CURRENT MEDICATIONS       Discharge Medication List as of 12/25/2021 11:43 PM      CONTINUE these medications which have NOT CHANGED    Details   diclofenac (VOLTAREN) 75 MG EC tablet Take 1 tablet by mouth 2 times daily, Disp-20 tablet, R-0Print      ondansetron (ZOFRAN) 4 MG tablet take 1 tablet by mouth every 12 hours if needed for nausea, R-0Historical Med             ALLERGIES     Patient has no known allergies. FAMILY HISTORY     No family history on file.      SOCIAL HISTORY       Social History     Socioeconomic History    Marital status: Single     Spouse name: Not on file    Number of children: Not on file    Years of education: Not on file    Highest education level: Not on file   Occupational History    Not on file   Tobacco Use    Smoking status: Never Smoker    Smokeless tobacco: Never Used   Vaping Use    Vaping Use: Never used   Substance and Sexual Activity    Alcohol use: No    Drug use: No    Sexual activity: Not on file   Other Topics Concern    Not on file   Social History Narrative    Not on file     Social Determinants of Health     Financial Resource Strain:     Difficulty of Paying Living Expenses: Not on file   Food Insecurity:     Worried About Running Out of Food in the Last Year: Not on file    Thierry of Food in the Last Year: Not on file   Transportation Needs:     Lack of Transportation (Medical): Not on file    Lack of Transportation (Non-Medical): Not on file   Physical Activity:     Days of Exercise per Week: Not on file    Minutes of Exercise per Session: Not on file   Stress:     Feeling of Stress : Not on file   Social Connections:     Frequency of Communication with Friends and Family: Not on file    Frequency of Social Gatherings with Friends and Family: Not on file    Attends Spiritism Services: Not on file    Active Member of Clubs or Organizations: Not on file    Attends Club or Organization Meetings: Not on file    Marital Status: Not on file   Intimate Partner Violence:     Fear of Current or Ex-Partner: Not on file    Emotionally Abused: Not on file    Physically Abused: Not on file    Sexually Abused: Not on file   Housing Stability:     Unable to Pay for Housing in the Last Year: Not on file    Number of [place] in the Last Year: Not on file    Unstable Housing in the Last Year: Not on file       REVIEW OF SYSTEMS     Review of Systems   Constitutional: Negative for chills, diaphoresis and fever. HENT: Negative for trouble swallowing and voice change. Eyes: Negative for visual disturbance. Respiratory: Positive for cough and chest tightness. Negative for shortness of breath. Cardiovascular: Negative for chest pain and leg swelling. Gastrointestinal: Negative for abdominal pain, blood in stool, diarrhea, nausea and vomiting. Genitourinary: Negative for dysuria, frequency and hematuria. Musculoskeletal: Negative for back pain and neck pain. Skin: Negative for rash and wound. Neurological: Negative for speech difficulty, weakness, numbness and headaches. Psychiatric/Behavioral: Negative for confusion. Except as noted above the remainder of the review of systems was reviewed and is. PHYSICAL EXAM    (up to 7 for level 4, 8 or more for level 5)     ED Triage Vitals   BP Temp Temp Source Pulse Resp SpO2 Height Weight   12/25/21 2148 12/25/21 2146 12/25/21 2146 12/25/21 2146 12/25/21 2146 12/25/21 2146 -- --   (!) 154/103 99.6 °F (37.6 °C) Oral 108 18 97 %         Physical Exam  Vitals and nursing note reviewed. Constitutional:       General: She is not in acute distress. Appearance: She is well-developed. She is not ill-appearing, toxic-appearing or diaphoretic. HENT:      Head: Normocephalic and atraumatic. Eyes:      General: No scleral icterus. Conjunctiva/sclera: Conjunctivae normal.      Right eye: Right conjunctiva is not injected. Left eye: Left conjunctiva is not injected. Pupils: Pupils are equal, round, and reactive to light. Neck:      Thyroid: No thyromegaly. Trachea: No tracheal deviation. Cardiovascular:      Rate and Rhythm: Normal rate and regular rhythm. Heart sounds: Normal heart sounds. No murmur heard. No friction rub. No gallop. Pulmonary:      Effort: Pulmonary effort is normal. No respiratory distress. Breath sounds: Normal breath sounds. No stridor. No wheezing or rales. Abdominal:      General: Bowel sounds are normal. There is no distension. Palpations: Abdomen is soft. There is no mass. Tenderness: There is no abdominal tenderness. There is no guarding or rebound. Comments: Negative Villatoro's sign  Nontender McBurney's Point  Negative Rovsig's sign  No bruising or echymosis of abdomen   Musculoskeletal:         General: No tenderness. Cervical back: Normal range of motion and neck supple.       Comments: Negative Ady's Sign bilaterally   Lymphadenopathy:      Cervical: No cervical adenopathy. Skin:     General: Skin is warm and dry. Coloration: Skin is not pale. Findings: No erythema or rash. Neurological:      Mental Status: She is alert and oriented to person, place, and time. Cranial Nerves: No cranial nerve deficit. Motor: No abnormal muscle tone. Coordination: Coordination normal.      Comments: No nystagmus   Psychiatric:         Behavior: Behavior normal.         Thought Content: Thought content normal.         DIAGNOSTIC RESULTS     EKG:(none if blank)  All EKG's are [facility] who either signs or Co-signs this chart in the absence of a cardiologist.    No ischemia    RADIOLOGY: (none if blank)   Interpretation per the Radiologistbelow, if available at the time of this note:    XR CHEST PORTABLE   Final Result      No acute intrathoracic process. **This report has been created using voice recognition software. It may contain minor errors which are inherent in voice recognition technology. **      Final report electronically signed by Dr. Aishwarya Main on 12/25/2021 10:46 PM          LABS:  Labs Reviewed   RAPID INFLUENZA A/B ANTIGENS - Abnormal; Notable for the following components:       Result Value    Flu B Antigen Positive (*)     All other components within normal limits   COVID-19, RAPID       All other labs were within normal range or not returned as of this dictation. Please note, any cultures that may have been sent were not resulted at the time of this patient visit.     EMERGENCY DEPARTMENT COURSE andMedical Decision Making:     MDM/   Well appearing nontoxic  No clinical evidence of PE  Has influenza  Not a good candidate for Tamiflu  Advised fluids, nsaids, rest, follow up        Strict returnprecautions and follow up instructions were discussed with the patient with which the patient agrees      ED Medications administered this visit:    Medications   ibuprofen (ADVIL;MOTRIN) tablet 400 mg (400 mg Oral Given 12/25/21 5887)         Procedures: (None if blank)    The patient was seen at a time when COVID-19 was severely surging in the region, placing a significant strain on available resources and personnel. CLINICAL IMPRESSION     1.  Influenza B          DISPOSITION/PLAN   DISPOSITION Decision To Discharge 12/25/2021 11:43:12 PM      PATIENT REFERRED TO:  [address]  [address] [place] [address]  [phone]  In 1 week        DISCHARGE MEDICATIONS:  Discharge Medication List as of 12/25/2021 11:43 PM        @[facility]([id number]:LAST:1)      (Please note that portions of this note were completed with a voice recognition program.  Efforts were made to edit the dictations but occasionallywords are mis-transcribed.)      Catrachita Bell DO,FACEKIERSTEN (electronically signed)  Attending Physician, Emergency [address], DO  12/26/21 3090

## 2021-12-26 NOTE — ED NOTES
Pt to the ED via self. Patient presents with complaints of listed above. Patient states that it has been going on for months to days. Patient is alert and oriented x 4. Respirations are regular and unlabored. Family at the bedside.      Damaris Day RN  12/25/21 0300

## 2022-03-01 ENCOUNTER — NURSE ONLY (OUTPATIENT)
Dept: LAB | Age: 22
End: 2022-03-01

## 2022-03-03 LAB
C. TRACHOMATIS DNA,THIN PREP: NEGATIVE
N. GONORRHOEAE DNA, THIN PREP: NEGATIVE
SOURCE: NORMAL

## 2022-03-05 LAB
APTIMA MEDIA TYPE: NORMAL
T. VAGINALIS SPECIMEN SOURCE: NORMAL
TRICHOMONAS VAGINALIS BY NAA: NEGATIVE

## 2022-03-08 LAB — CYTOLOGY THIN PREP PAP: NORMAL

## 2022-05-18 ENCOUNTER — HOSPITAL ENCOUNTER (EMERGENCY)
Age: 22
Discharge: HOME | End: 2022-05-18
Payer: COMMERCIAL

## 2022-05-18 ENCOUNTER — APPOINTMENT (OUTPATIENT)
Dept: GENERAL RADIOLOGY | Age: 22
End: 2022-05-18
Payer: COMMERCIAL

## 2022-05-18 VITALS
OXYGEN SATURATION: 96 % | TEMPERATURE: 97.9 F | BODY MASS INDEX: 47.09 KG/M2 | SYSTOLIC BLOOD PRESSURE: 172 MMHG | HEART RATE: 92 BPM | WEIGHT: 293 LBS | HEIGHT: 66 IN | RESPIRATION RATE: 16 BRPM | DIASTOLIC BLOOD PRESSURE: 91 MMHG

## 2022-05-18 DIAGNOSIS — M25.531 WRIST PAIN, RIGHT: Primary | ICD-10-CM

## 2022-05-18 DIAGNOSIS — J00 ACUTE RHINITIS: ICD-10-CM

## 2022-05-18 PROCEDURE — 99213 OFFICE O/P EST LOW 20 MIN: CPT | Performed by: NURSE PRACTITIONER

## 2022-05-18 PROCEDURE — 99213 OFFICE O/P EST LOW 20 MIN: CPT

## 2022-05-18 PROCEDURE — 73110 X-RAY EXAM OF WRIST: CPT

## 2022-05-18 ASSESSMENT — PAIN - FUNCTIONAL ASSESSMENT: PAIN_FUNCTIONAL_ASSESSMENT: 0-10

## 2022-05-18 ASSESSMENT — ENCOUNTER SYMPTOMS
EYE DISCHARGE: 0
RHINORRHEA: 1
DIARRHEA: 0
SINUS PAIN: 0
NAUSEA: 0
TROUBLE SWALLOWING: 0
SINUS PRESSURE: 0
EYE REDNESS: 0
COUGH: 0
SHORTNESS OF BREATH: 0
VOMITING: 0
SORE THROAT: 0

## 2022-05-18 ASSESSMENT — PAIN SCALES - GENERAL: PAINLEVEL_OUTOF10: 6

## 2022-05-18 NOTE — ED PROVIDER NOTES
[address]       Chief Complaint   Patient presents with    Sinusitis     3 days    Wrist Pain     right       Nurses Notes reviewed and I agree except as noted in the HPI. HISTORY OF PRESENT ILLNESS   Conception Estephania is a 25 y.o. female who presents for evaluation of sinus problems and right wrist pain. Right wrist pain after attempting to \"pop\" her wrist.  States that she normally pops her wrist without difficulty. Notes a more difficult/firm attempt. Pain is aching, intermittent. Rates it 6/10. Pain worse with movement/use. No numbness/tingling. She is right-handed. She also complains of sinus congestion without sinus pain, pressure, headache. No fever, loss of taste or smell. No COVID-19 exposure. No treatment prior to arrival.    She is currently 19 weeks pregnant. No current treatment. REVIEW OF SYSTEMS     Review of Systems   Constitutional: Negative for chills, diaphoresis, fatigue and fever. HENT: Positive for congestion, postnasal drip and rhinorrhea. Negative for ear pain, sinus pressure, sinus pain, sore throat and trouble swallowing. Eyes: Negative for discharge and redness. Respiratory: Negative for cough and shortness of breath. Cardiovascular: Negative for chest pain. Gastrointestinal: Negative for diarrhea, nausea and vomiting. Genitourinary: Negative for decreased urine volume. Musculoskeletal: Positive for arthralgias. Negative for joint swelling, neck pain and neck stiffness. Skin: Negative for rash. Neurological: Negative for headaches. Hematological: Negative for adenopathy. Psychiatric/Behavioral: Negative for sleep disturbance. PAST MEDICAL HISTORY         Diagnosis Date    Polycystic ovary disease        SURGICAL HISTORY     Patient  has a past surgical history that includes Tonsillectomy and adenoidectomy and cyst removal (Left).     CURRENT MEDICATIONS       Discharge Medication List as of 5/18/2022 11:59 AM      CONTINUE these medications which have NOT CHANGED    Details   diclofenac (VOLTAREN) 75 MG EC tablet Take 1 tablet by mouth 2 times daily, Disp-20 tablet, R-0Print      ondansetron (ZOFRAN) 4 MG tablet take 1 tablet by mouth every 12 hours if needed for nausea, R-0Historical Med             ALLERGIES     Patient is has No Known Allergies. FAMILY HISTORY     Patient'sfamily history is not on file. SOCIAL HISTORY     Patient  reports that she has never smoked. She has never used smokeless tobacco. She reports that she does not drink alcohol and does not use drugs. PHYSICAL EXAM     ED TRIAGE VITALS  BP: (!) 172/91, Temp: 97.9 °F (36.6 °C), Pulse: 92, Resp: 16, SpO2: 96 %  Physical Exam  Vitals and nursing note reviewed. Constitutional:       General: She is not in acute distress. Appearance: Normal appearance. She is well-developed. She is not ill-appearing, toxic-appearing or diaphoretic. HENT:      Head: Normocephalic and atraumatic. Right Ear: Hearing, tympanic membrane, ear canal and external ear normal. No mastoid tenderness. No hemotympanum. Tympanic membrane is not perforated, erythematous or bulging. Left Ear: Hearing, tympanic membrane, ear canal and external ear normal. No mastoid tenderness. No hemotympanum. Tympanic membrane is not perforated, erythematous or bulging. Nose: Congestion present. No rhinorrhea. Mouth/Throat:      Mouth: Mucous membranes are moist.      Pharynx: Oropharynx is clear. Uvula midline. Tonsils: 0 on the right. 0 on the left. Eyes:      General: No scleral icterus. Conjunctiva/sclera: Conjunctivae normal.      Right eye: Right conjunctiva is not injected. No hemorrhage. Left eye: Left conjunctiva is not injected. No hemorrhage. Neck:      Thyroid: No thyromegaly. Trachea: Trachea normal.   Cardiovascular:      Rate and Rhythm: Normal rate and regular rhythm.   No extrasystoles are present. Chest Wall: PMI is not displaced. Pulses:           Radial pulses are 2+ on the right side and 2+ on the left side. Heart sounds: Normal heart sounds. No murmur heard. No friction rub. No gallop. Pulmonary:      Effort: Pulmonary effort is normal. No respiratory distress. Breath sounds: Normal breath sounds. Chest:   Breasts:      Right: No supraclavicular adenopathy. Left: No supraclavicular adenopathy. Musculoskeletal:      Right wrist: Tenderness and bony tenderness (ulnar) present. No swelling or snuff box tenderness. Normal range of motion. Normal pulse. Left wrist: Normal.      Cervical back: Normal range of motion and neck supple. Lymphadenopathy:      Head:      Right side of head: No submental, submandibular, tonsillar or occipital adenopathy. Left side of head: No submental, submandibular, tonsillar or occipital adenopathy. Cervical: No cervical adenopathy. Upper Body:      Right upper body: No supraclavicular adenopathy. Left upper body: No supraclavicular adenopathy. Skin:     General: Skin is warm and dry. Capillary Refill: Capillary refill takes less than 2 seconds. Coloration: Skin is not jaundiced or pale. Findings: No bruising, ecchymosis, erythema or rash. Comments: Skin warm and dry to touch, no rashes noted on exposed surfaces. Neurological:      Mental Status: She is alert and oriented to person, place, and time. She is not disoriented. Sensory: Sensation is intact. Psychiatric:         Mood and Affect: Mood normal.         Behavior: Behavior normal. Behavior is cooperative. DIAGNOSTIC RESULTS   Labs: No results found for this visit on 05/18/22. IMAGING:  XR WRIST RIGHT (MIN 3 VIEWS)   Final Result      No fracture or dislocation.       Final report electronically signed by Dr. Emmanuel Lang on 5/18/2022 11:51 AM        URGENT CARE COURSE:     Vitals:    05/18/22 1106   BP: (!) 172/91   Pulse: 92   Resp: 16   Temp: 97.9 °F (36.6 °C)   TempSrc: Temporal   SpO2: 96%   Weight: (!) 370 lb (167.8 kg)   Height: 5' 6\" (1.676 m)       Medications - No data to display  PROCEDURES:  None  FINALIMPRESSION      1. Wrist pain, right    2. Acute rhinitis        DISPOSITION/PLAN   DISPOSITION Decision To Discharge 05/18/2022 11:59:02 AM  X-ray negative for fracture. Rest, ice, elevation. Acute rhinitis, refer to pregnancy medication list.  Sinus rinse twice daily. If symptoms worsen return or go to ER. PATIENT REFERRED TO:  [facility] [address],[address] [address] [facility] [address]    Establish care as needed. Refer to medication list.  Sinus rinse twice daily. If symptoms worsen go to ER.     DISCHARGE MEDICATIONS:  Discharge Medication List as of 5/18/2022 11:59 AM        Discharge Medication List as of 5/18/2022 11:59 AM          [address], APRN - CNP  05/18/22 1221

## 2022-05-18 NOTE — ED NOTES
To [facility] with complaints of right wrist pain. Started after she tried to pop it.  Also complains of sinus infection for 3 days     Martha Hager RN  05/18/22 0432

## 2022-05-31 ENCOUNTER — HOSPITAL ENCOUNTER (OUTPATIENT)
Age: 22
Discharge: HOME | End: 2022-05-31
Attending: OBSTETRICS & GYNECOLOGY | Admitting: OBSTETRICS & GYNECOLOGY
Payer: COMMERCIAL

## 2022-05-31 VITALS
HEART RATE: 95 BPM | RESPIRATION RATE: 18 BRPM | HEIGHT: 66 IN | BODY MASS INDEX: 47.09 KG/M2 | WEIGHT: 293 LBS | SYSTOLIC BLOOD PRESSURE: 129 MMHG | TEMPERATURE: 96.6 F | DIASTOLIC BLOOD PRESSURE: 70 MMHG

## 2022-05-31 PROBLEM — R58 BLEEDING: Status: ACTIVE | Noted: 2022-05-31

## 2022-05-31 LAB
BILIRUBIN URINE: NEGATIVE
BLOOD, URINE: NEGATIVE
CHARACTER, URINE: CLEAR
COLOR: YELLOW
GLUCOSE URINE: NEGATIVE MG/DL
KETONES, URINE: NEGATIVE
LEUKOCYTE ESTERASE, URINE: NEGATIVE
NITRITE, URINE: NEGATIVE
PH UA: 7 (ref 5–9)
PROTEIN UA: NEGATIVE
SPECIFIC GRAVITY, URINE: 1.02 (ref 1–1.03)
UROBILINOGEN, URINE: 1 EU/DL (ref 0–1)

## 2022-05-31 PROCEDURE — 81003 URINALYSIS AUTO W/O SCOPE: CPT

## 2022-05-31 NOTE — FLOWSHEET NOTE
Spoke with Dr. Mortimer Rich on the phone and updated on patient arrival for spotting, noticed only when wiping twice yesterday and once today. Patient states baby is moving as usual and denies any regular cramping. FHTs by doppler 151-164, TOCO placed on soft nontender abd, noting patient is 370lbs. Upon visual inspection of vaginal no bleeding was noticed. Patient denies any abnormal or hard stools. Orders received.

## 2022-05-31 NOTE — FLOWSHEET NOTE
Patient arrived to unit with complaint of mild spotting, noticed only when wiping, occurred twice yesterday and once today, states she is feeling the baby move as  Usual and she is feeling \"liz\" crampy. Patient shown to the RR for voiding and changing into gown.

## 2022-05-31 NOTE — FLOWSHEET NOTE
Discharge instructions given to patient, discussed increasing fluids to at least 8-10 glasses of water daily and promoted rest, instructed on keeping next scheduled appointment and to come in if any notice of vaginal bleeding or leaking/gushing of fluids, decreased fetal movement or contractions that become increasingly stronger and closer together. Patient verbalizes understanding and denied any questions. Copy of discharged instructions given to patient.

## 2022-07-21 NOTE — PROGRESS NOTES
Department of Obstetrics and Gynecology  Labor and Delivery  NST Triage Note      Pt Name: Madonna Rosa  MRN: [id number] [place] #: [de-identified]  YOB: 2000  Procedure Performed By: Liz Pulliam MD, MD        SUBJECTIVE: Patient presented at 21+3 weeks complaining of vaginal spotting. + fetal movement. denies contraction,  and  leaking fluid. OBJECTIVE    Vitals:  /70   Pulse 95   Temp (!) 96.6 °F (35.9 °C) (Temporal)   Resp 18   Ht 5' 6\" (1.676 m)   Wt (!) 370 lb (167.8 kg)   LMP 01/01/2022   BMI 59.72 kg/m²     Fetal heart rate:         Baseline Heart Rate:  150-160        . Sve closed, no blood per nursing    ASSESSMENT & PLAN:   Reassurance provided. Labor precautions given. Discharged to home in a stable condition and instructed to follow up at her next scheduled appointment.     Liz Pulliam MD 7/21/2022 12:05 AM

## 2022-08-18 ENCOUNTER — HOSPITAL ENCOUNTER (OUTPATIENT)
Age: 22
Discharge: HOME | End: 2022-08-18
Attending: OBSTETRICS & GYNECOLOGY | Admitting: OBSTETRICS & GYNECOLOGY
Payer: COMMERCIAL

## 2022-08-18 VITALS
DIASTOLIC BLOOD PRESSURE: 98 MMHG | WEIGHT: 293 LBS | SYSTOLIC BLOOD PRESSURE: 144 MMHG | HEIGHT: 66 IN | HEART RATE: 108 BPM | TEMPERATURE: 98.2 F | OXYGEN SATURATION: 99 % | RESPIRATION RATE: 18 BRPM | BODY MASS INDEX: 47.09 KG/M2

## 2022-08-18 PROBLEM — N93.9 VAGINAL SPOTTING: Status: ACTIVE | Noted: 2022-08-18

## 2022-08-18 PROCEDURE — 6370000000 HC RX 637 (ALT 250 FOR IP): Performed by: OBSTETRICS & GYNECOLOGY

## 2022-08-18 RX ORDER — ACETAMINOPHEN 325 MG/1
650 TABLET ORAL ONCE
Status: COMPLETED | OUTPATIENT
Start: 2022-08-18 | End: 2022-08-18

## 2022-08-18 RX ADMIN — ACETAMINOPHEN 650 MG: 325 TABLET ORAL at 19:50

## 2022-08-18 ASSESSMENT — PAIN - FUNCTIONAL ASSESSMENT: PAIN_FUNCTIONAL_ASSESSMENT: ACTIVITIES ARE NOT PREVENTED

## 2022-08-18 ASSESSMENT — PAIN SCALES - GENERAL: PAINLEVEL_OUTOF10: 7

## 2022-08-18 NOTE — FLOWSHEET NOTE
here at 28 5/7wks with complaints of spotting this morning. Pt states she could not come this morning because she was hurting. Since then pt denies spotting, leaking or contractions. Pt states she has constant back pain. Pt using BR after arrival. No spotting when wiping. Pt denies intercourse, heavy lifting or struggling for a BM. Pt states baby is active.  Friend at bedside Final Anesthesia Post-op Assessment    Patient: Dewey Perez  Procedure(s) Performed: TONSILLECTOMY LEFT RADICAL - LEFTDISSECTION, NECK LEFT - LEFTTHYROIDECTOMY, TOTAL  Anesthesia type: General    Vitals Value Taken Time   Temp 36.7 Â°C (98 Â°F) 9/18/2019  5:36 PM   Pulse 82 9/18/2019  7:08 PM   Resp 14 9/18/2019  7:08 PM   SpO2 93 % 9/18/2019  7:08 PM   /54 9/18/2019  7:06 PM   Vitals shown include unvalidated device data.     Last 24 I/O:     Intake/Output Summary (Last 24 hours) at 9/18/2019 1910  Last data filed at 9/18/2019 1900  Gross per 24 hour   Intake 1200 ml   Output 290 ml   Net 910 ml       PATIENT LOCATION: PACU Phase 1  POST-OP VITAL SIGNS: stable  LEVEL OF CONSCIOUSNESS: participates in exam, awake, oriented, alert and answers questions appropriately  RESPIRATORY STATUS: spontaneous ventilation, unassisted and room air  CARDIOVASCULAR: blood pressure returned to baseline  HYDRATION: euvolemic    PAIN MANAGEMENT: adequately controlled  NAUSEA: None  AIRWAY PATENCY: patent  POST-OP ASSESSMENT: no complications, patient tolerated procedure well with no complications, no evidence of recall and sufficiently recovered from acute administration of anesthesia effects and able to participate in evaluation  COMPLICATIONS: none

## 2022-08-19 NOTE — PLAN OF CARE
Problem: Pain  Goal: Verbalizes/displays adequate comfort level or baseline comfort level  Outcome: Progressing  Flowsheets (Taken 2022)  Verbalizes/displays adequate comfort level or baseline comfort level:   Encourage patient to monitor pain and request assistance   Assess pain using appropriate pain scale   Administer analgesics based on type and severity of pain and evaluate response   Implement non-pharmacological measures as appropriate and evaluate response   Consider cultural and social influences on pain and pain management   Notify Licensed Independent Practitioner if interventions unsuccessful or patient reports new pain     Problem: Vaginal Birth or  Section  Goal: Fetal and maternal status remain reassuring during the birth process  Description:  Birth OB-Pregnancy care plan goal which identifies if the fetal and maternal status remain reassuring during the birth process  Outcome: Progressing  Flowsheets (Taken 2022)  Fetal and Maternal Status Remain Reassuring During the Birth Process:   Monitor fetal heart rate   Monitor uterine activity   Monitor vital signs     Problem: Safety - Adult  Goal: Free from fall injury  Outcome: Progressing  Flowsheets (Taken 2022)  Free From Fall Injury:   Instruct family/caregiver on patient safety   Based on caregiver fall risk screen, instruct family/caregiver to ask for assistance with transferring infant if caregiver noted to have fall risk factors     Problem: Discharge Planning  Goal: Discharge to home or other facility with appropriate resources  Outcome: Progressing  Flowsheets (Taken 2022)  Discharge to home or other facility with appropriate resources:   Identify barriers to discharge with patient and caregiver   Arrange for needed discharge resources and transportation as appropriate   Identify discharge learning needs (meds, wound care, etc)   Arrange for interpreters to assist at discharge as needed   Refer

## 2022-08-19 NOTE — FLOWSHEET NOTE
Late entry: Dr. Tere Montejo in dept, viewing EFM. Updated on  here at 28 6/7wks with complaints of spotting last evening and then again this morning. Pt was in too much pain to come this morning. No additional spotting since then, pt complains of a constant backache 5/10. RN visually inspecting vagina, no active bleeding or old blood noted. Reassuring FHT's not yet reactive. 1 contraction that pt did not feel. New orders to continue oral hydration, tylenol for pain, warmth to back, RN to check cervix. If not blood on glove, ok to discharge to home.

## 2022-08-19 NOTE — DISCHARGE INSTRUCTIONS
Home Undelivered Discharge Instructions    After Discharge Orders:           Diet: regular diet   Increase fluid intake to 8-10 glasses of water daily    Rest: normal activity as tolerated    Other instructions: Do kick counts once a day on your baby. Choose the time of day your baby is most active. Get in a comfortable lying or sitting position and time how long it takes to feel 10 kicks, twists, turns, swishes, or rolls. Call Your Doctor if Any of the Following Occurs   Leaking fluid from vagina with or without contractions  Bright red vaginal bleeding occurs that is as heavy as or heavier than a period  Regular contractions are longer, stronger, and closer together  Noticeable decreased fetal movement  Elevated temperature >100.5°F and chills  Blurred vision, spots before eyes, unrelievable headache, severe facial swelling, or upper abdominal pain  Contact physician or hospital OB unit if signs of premature labor occur at ?36 weeks  Persistent or rhythmic low back pain that feels different than you are used to  Menstrual like cramps  Intestinal cramps with or without diarrhea  Pelvic pressure or rhythmic tightening that feels different than you are used to  [facility] or a gush of fluid from your vagina  Vaginal bleeding as heavy as a period  General Information     Difference between:  False Labor True Labor   1. Contractions often are irregular and don't consistently get closer together (called [place] contractions) 1. Contractions come at regular intervals and, as time goes on, get closer together. 2. Contractions may often stop when you rest or with a position change. 2. Contractions continue despite movement or walking. Contractions get stronger and closer together with time. 3. Often felt in the lower abdomen. 3. Usually felt in back coming around to the front.      Reminder to Patient   Please bring all teaching sheets and discharge information with you if you return to the hospital or the physician's office/clinic for follow-up care. If you have any questions, please call: \"[facility]Nurse\" [phone] or [phone]. Reviewed Dates   5/12/2007  Document developed by   Labor and Delivery Discharge Education Form  Disclaimer: We want you to understand more clearly each of the health conditions and procedures you may have. This patient leaflet is a summary of useful information to help you gain a better understanding of these health topics. Other information about this condition or procedure may be important for you to know. Please talk with your healthcare provider for more information about your special health needs. Coronavirus ([id number]): Pregnancy, Birth and Baby Care    What do you need know if you are pregnant regarding coronavirus (COVID-19)? · From what experts know so far, pregnant people do NOT seem more likely than others to get COVID-19 and do NOT have a higher risk of severe complications. What can you do to protect yourself from COVID-19? · Practice social distancing.  When you need to leave the home try to stay at least 6 feet away from other people. Avoid large crowds. · When you leave the house to prevent spreading sickness to others - Wear a facemask covering your mouth and nose. Remove by folding outside of mask together and place in container, wash daily or as soiled. · Wash your hands often by rubbing your hands with soap and water for at least 20 seconds, rinse, and dry with a paper towel that can be thrown away or may use hand  of at least 60% alcohol. Covering all surfaces of your hands by rubbing them together until dry. · Avoid touching your face with unwashed hands, especially your mouth, nose and eyes. · Avoid traveling. What should you do if you have or think you may have COVID-19? · For most infected, this is a mild illness and you will be able to recover at home.    · To avoid spreading to others:  ? Stay home except to get medical care.  ? Call a medical facility before you show up. This will help the staff prevent others from being exposed. ? Stay separate from others in your home, at least 6 feet. Use a separate bathroom, if possible. ? Wear a cloth facemask to cover your nose and mouth when you are around other people including at home. ? Clean your hands often. Wash your hands with soap and water for at least 20 seconds. ? Cough or sneeze into a tissue or your arm. Wash your hands immediately after. ? Dont share personal household items including towels and bedding. ? Clean and disinfect objects in your isolation area every day. · If you are in labor and you have, or think you may have COVID-19, call your OB care provider and the hospital birthing unit before you go, so the staff can properly prepare and protect you, your baby and others from being infected. Wear a mask when you leave your home, as you will be asked to continue to wear a mask during your time in the hospital.  · Mother-to-child transmission of COVID-19 during pregnancy is unlikely, but after birth a baby is susceptible to person-to-person spread. ? The determination of whether or not to separate you and your baby at the time of birth will be decided using shared decision-making between you and your clinical team.  ? After your baby is born, your health care provider may recommend you not hold your baby and/or that you stay in a separate room from your baby until you get better. ? If you and your baby are not , wear a facemask at all times and wash your hands thoroughly before touching, holding or feeding your baby.

## 2022-08-23 ENCOUNTER — HOSPITAL ENCOUNTER (OUTPATIENT)
Age: 22
Discharge: HOME | End: 2022-08-23
Attending: OBSTETRICS & GYNECOLOGY | Admitting: OBSTETRICS & GYNECOLOGY
Payer: COMMERCIAL

## 2022-08-23 VITALS
WEIGHT: 293 LBS | OXYGEN SATURATION: 98 % | HEART RATE: 94 BPM | SYSTOLIC BLOOD PRESSURE: 132 MMHG | RESPIRATION RATE: 18 BRPM | TEMPERATURE: 98.8 F | BODY MASS INDEX: 47.09 KG/M2 | DIASTOLIC BLOOD PRESSURE: 75 MMHG | HEIGHT: 66 IN
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
## 2022-08-23 PROCEDURE — 6370000000 HC RX 637 (ALT 250 FOR IP): Performed by: STUDENT IN AN ORGANIZED HEALTH CARE EDUCATION/TRAINING PROGRAM

## 2022-08-23 RX ORDER — ACETAMINOPHEN 325 MG/1
650 TABLET ORAL EVERY 4 HOURS PRN
Status: DISCONTINUED | OUTPATIENT
Start: 2022-08-23 | End: 2022-08-24 | Stop reason: HOSPADM

## 2022-08-23 RX ADMIN — ACETAMINOPHEN 650 MG: 325 TABLET ORAL at 20:34

## 2022-08-23 ASSESSMENT — PAIN SCALES - GENERAL: PAINLEVEL_OUTOF10: 9

## 2022-08-23 NOTE — FLOWSHEET NOTE
33wk3d pt of Dr Beck Tisha arrives to 4B01. Pt states she fell through her front porch 30 min ago. The pt states she took one step onto the wood porch and fell through and hit her belly. Pt denies any pain in her belly, or bleeding. Pt states \"I just wanted to make sure baby's heartbeat was ok\". Positive fetal movement noted. Pt changed into gown and in bed to put EFM on.

## 2022-08-23 NOTE — FLOWSHEET NOTE
Dr Asher Downing paged to please call for new pt. Call returned and notified of pt's arrival after a fall at home through her porch. Pt states hitting belly during the fall. Pt denies pain in her abdomen or any other OB complaints. Pt is 380 lbs. FHT reactive. No contractions noted on the monitor. Pt's blood type is O+. Pt only has complaints of leg pain. No significant swelling or injuries noted. Pt was able to get out of wheelchair and walk to bed and stand next to bed with no problems. Orders received.

## 2022-08-24 NOTE — PLAN OF CARE
Problem: Vaginal Birth or  Section  Goal: Fetal and maternal status remain reassuring during the birth process  Description:  Birth OB-Pregnancy care plan goal which identifies if the fetal and maternal status remain reassuring during the birth process  Outcome: Progressing  Flowsheets (Taken 2022)  Fetal and Maternal Status Remain Reassuring During the Birth Process:   Monitor vital signs   Monitor fetal heart rate   Monitor uterine activity     Problem: Pain  Goal: Verbalizes/displays adequate comfort level or baseline comfort level  Outcome: Progressing  Flowsheets (Taken 2022)  Verbalizes/displays adequate comfort level or baseline comfort level:   Encourage patient to monitor pain and request assistance   Assess pain using appropriate pain scale   Administer analgesics based on type and severity of pain and evaluate response   Implement non-pharmacological measures as appropriate and evaluate response   Consider cultural and social influences on pain and pain management   Notify Licensed Independent Practitioner if interventions unsuccessful or patient reports new pain     Problem: Discharge Planning  Goal: Discharge to home or other facility with appropriate resources  Outcome: Progressing  Flowsheets (Taken 2022)  Discharge to home or other facility with appropriate resources:   Identify barriers to discharge with patient and caregiver   Arrange for needed discharge resources and transportation as appropriate   Identify discharge learning needs (meds, wound care, etc)   Arrange for interpreters to assist at discharge as needed   Refer to discharge planning if patient needs post-hospital services based on physician order or complex needs related to functional status, cognitive ability or social support system     Care plan reviewed with patient. Patient verbalize understanding of the plan of care and contribute to goal setting.

## 2022-08-24 NOTE — DISCHARGE INSTRUCTIONS
Home Undelivered Discharge Instructions    After Discharge Orders:           Diet: regular diet   Increase fluid intake to 8-10 glasses of water daily    Rest: normal activity as tolerated    Other instructions: Do kick counts once a day on your baby. Choose the time of day your baby is most active. Get in a comfortable lying or sitting position and time how long it takes to feel 10 kicks, twists, turns, swishes, or rolls. Call Your Doctor if Any of the Following Occurs   Leaking fluid from vagina with or without contractions  Bright red vaginal bleeding occurs that is as heavy as or heavier than a period  Regular contractions are longer, stronger, and closer together  Noticeable decreased fetal movement  Elevated temperature >100.5°F and chills  Blurred vision, spots before eyes, unrelievable headache, severe facial swelling, or upper abdominal pain  Contact physician or hospital OB unit if signs of premature labor occur at ?36 weeks  Persistent or rhythmic low back pain that feels different than you are used to  Menstrual like cramps  Intestinal cramps with or without diarrhea  Pelvic pressure or rhythmic tightening that feels different than you are used to  [facility] discharge or a gush of fluid from your vagina  Vaginal bleeding as heavy as a period  General Information     Difference between:  False Labor True Labor   1. Contractions often are irregular and don't consistently get closer together (called [place] contractions) 1. Contractions come at regular intervals and, as time goes on, get closer together. 2. Contractions may often stop when you rest or with a position change. 2. Contractions continue despite movement or walking. Contractions get stronger and closer together with time. 3. Often felt in the lower abdomen. 3. Usually felt in back coming around to the front.      Reminder to Patient   Please bring all teaching sheets and discharge information with you if you return to the hospital or care.  ? Call a medical facility before you show up. This will help the staff prevent others from being exposed. ? Stay separate from others in your home, at least 6 feet. Use a separate bathroom, if possible. ? Wear a cloth facemask to cover your nose and mouth when you are around other people including at home. ? Clean your hands often. Wash your hands with soap and water for at least 20 seconds. ? Cough or sneeze into a tissue or your arm. Wash your hands immediately after. ? Dont share personal household items including towels and bedding. ? Clean and disinfect objects in your isolation area every day. · If you are in labor and you have, or think you may have COVID-19, call your OB care provider and the hospital birthing unit before you go, so the staff can properly prepare and protect you, your baby and others from being infected. Wear a mask when you leave your home, as you will be asked to continue to wear a mask during your time in the hospital.  · Mother-to-child transmission of COVID-19 during pregnancy is unlikely, but after birth a baby is susceptible to person-to-person spread. ? The determination of whether or not to separate you and your baby at the time of birth will be decided using shared decision-making between you and your clinical team.  ? After your baby is born, your health care provider may recommend you not hold your baby and/or that you stay in a separate room from your baby until you get better. ? If you and your baby are not , wear a facemask at all times and wash your hands thoroughly before touching, holding or feeding your baby.

## 2022-08-24 NOTE — FLOWSHEET NOTE
Dr Colette Leon called unit for update. Notified FHT are sketchy at times. Hard to trace baby due to maternal girth. Audible movement noted. FHT reactive now. Pt has no complaints. Orders received.

## 2022-09-06 NOTE — PROCEDURES
Department of Obstetrics and Gynecology  Labor and Delivery   Triage Note      Pt Name: Kandace Hoffman  MRN: [id number] [place] #: [de-identified]  YOB: 2000  Procedure Performed By: Tati Hernandez DO        SUBJECTIVE: Patient presented at 33 week complaining of fall at home. OBJECTIVE    Vitals:  /75   Pulse 94   Temp 98.8 °F (37.1 °C) (Temporal)   Resp 18   Ht 5' 6\" (1.676 m)   Wt (!) 370 lb (167.8 kg)   LMP 01/01/2022   SpO2 98%   BMI 59.72 kg/m²     Fetal heart rate:         Baseline Heart Rate:  140        Accelerations:  present       Decelerations:  absent       Variability:  moderate    Contraction frequency: Irritability    The NST was reactive. It was a Category 1 tracing. ASSESSMENT & PLAN:    Discharged to home after 4hrs of monitoring in a stable condition and instructed to follow up at her next scheduled appointment.     Tati Hernandez DO

## 2022-09-13 ENCOUNTER — HOSPITAL ENCOUNTER (OUTPATIENT)
Age: 22
Discharge: HOME | End: 2022-09-13
Attending: OBSTETRICS & GYNECOLOGY | Admitting: OBSTETRICS & GYNECOLOGY
Payer: COMMERCIAL

## 2022-09-13 VITALS
RESPIRATION RATE: 16 BRPM | WEIGHT: 293 LBS | BODY MASS INDEX: 47.09 KG/M2 | TEMPERATURE: 97.3 F | HEIGHT: 66 IN | SYSTOLIC BLOOD PRESSURE: 106 MMHG | HEART RATE: 83 BPM | DIASTOLIC BLOOD PRESSURE: 63 MMHG | OXYGEN SATURATION: 98 %

## 2022-09-13 PROBLEM — O26.90 PREGNANCY COMPLICATION, ANTEPARTUM: Status: ACTIVE | Noted: 2022-09-13

## 2022-09-13 LAB
ABO: NORMAL
ALBUMIN SERPL-MCNC: 3 G/DL (ref 3.5–5.1)
ALP BLD-CCNC: 73 U/L (ref 38–126)
ALT SERPL-CCNC: 14 U/L (ref 11–66)
ANION GAP SERPL CALCULATED.3IONS-SCNC: 9 MEQ/L (ref 8–16)
ANTIBODY SCREEN: NORMAL
AST SERPL-CCNC: 18 U/L (ref 5–40)
BILIRUB SERPL-MCNC: < 0.2 MG/DL (ref 0.3–1.2)
BUN BLDV-MCNC: 5 MG/DL (ref 7–22)
CALCIUM SERPL-MCNC: 9.3 MG/DL (ref 8.5–10.5)
CHLORIDE BLD-SCNC: 105 MEQ/L (ref 98–111)
CO2: 22 MEQ/L (ref 23–33)
CREAT SERPL-MCNC: 0.5 MG/DL (ref 0.4–1.2)
CREATININE URINE: 127 MG/DL
ERYTHROCYTE [DISTWIDTH] IN BLOOD BY AUTOMATED COUNT: 13.5 % (ref 11.5–14.5)
ERYTHROCYTE [DISTWIDTH] IN BLOOD BY AUTOMATED COUNT: 45.6 FL (ref 35–45)
GFR SERPL CREATININE-BSD FRML MDRD: > 90 ML/MIN/1.73M2
GLUCOSE BLD-MCNC: 110 MG/DL (ref 70–108)
HCT VFR BLD CALC: 36.8 % (ref 37–47)
HEMOGLOBIN: 11.6 GM/DL (ref 12–16)
MCH RBC QN AUTO: 29.4 PG (ref 26–33)
MCHC RBC AUTO-ENTMCNC: 31.5 GM/DL (ref 32.2–35.5)
MCV RBC AUTO: 93.4 FL (ref 81–99)
PLATELET # BLD: 298 THOU/MM3 (ref 130–400)
PMV BLD AUTO: 10.2 FL (ref 9.4–12.4)
POTASSIUM SERPL-SCNC: 3.8 MEQ/L (ref 3.5–5.2)
PROT/CREAT RATIO, UR: 0.13
PROTEIN, URINE: 16.3 MG/DL
RBC # BLD: 3.94 MILL/MM3 (ref 4.2–5.4)
RH FACTOR: NORMAL
SODIUM BLD-SCNC: 136 MEQ/L (ref 135–145)
TOTAL PROTEIN: 6 G/DL (ref 6.1–8)
WBC # BLD: 9.9 THOU/MM3 (ref 4.8–10.8)

## 2022-09-13 PROCEDURE — 80053 COMPREHEN METABOLIC PANEL: CPT

## 2022-09-13 PROCEDURE — 84156 ASSAY OF PROTEIN URINE: CPT

## 2022-09-13 PROCEDURE — 86592 SYPHILIS TEST NON-TREP QUAL: CPT

## 2022-09-13 PROCEDURE — 82570 ASSAY OF URINE CREATININE: CPT

## 2022-09-13 PROCEDURE — 86850 RBC ANTIBODY SCREEN: CPT

## 2022-09-13 PROCEDURE — 36415 COLL VENOUS BLD VENIPUNCTURE: CPT

## 2022-09-13 PROCEDURE — 86900 BLOOD TYPING SEROLOGIC ABO: CPT

## 2022-09-13 PROCEDURE — 86901 BLOOD TYPING SEROLOGIC RH(D): CPT

## 2022-09-13 PROCEDURE — 85027 COMPLETE CBC AUTOMATED: CPT

## 2022-09-13 NOTE — PLAN OF CARE
Problem: Pain  Goal: Verbalizes/displays adequate comfort level or baseline comfort level  9/13/2022 1911 by Aishwarya Corona RN  Outcome: Progressing  9/13/2022 1910 by Aishwarya Corona RN  Flowsheets (Taken 9/13/2022 1910)  Verbalizes/displays adequate comfort level or baseline comfort level:   Encourage patient to monitor pain and request assistance   Assess pain using appropriate pain scale   Implement non-pharmacological measures as appropriate and evaluate response     Problem: Safety - Adult  Goal: Free from fall injury  9/13/2022 1911 by Aishwarya Corona RN  Outcome: Progressing  9/13/2022 1910 by Aishwarya Corona RN  Flowsheets (Taken 9/13/2022 1910)  Free From Fall Injury: Instruct family/caregiver on patient safety     Problem: Discharge Planning  Goal: Discharge to home or other facility with appropriate resources  9/13/2022 1911 by Aishwarya Corona RN  Outcome: Progressing  9/13/2022 1910 by Aishwarya Corona RN  Flowsheets (Taken 9/13/2022 1910)  Discharge to home or other facility with appropriate resources: Identify barriers to discharge with patient and caregiver   Care plan reviewed with patient and significant other. Patient and significant other verbalize understanding of the plan of care and contribute to goal setting.

## 2022-09-13 NOTE — FLOWSHEET NOTE
at 36.3 weeks pt of Dr. Jarred You. Dr. Pamela Comer covering for Dr. Jarred You. Dr. Pamela Comer notified of pt admission, first blood pressure 130/77 and labs being drawn.

## 2022-09-13 NOTE — PLAN OF CARE
Problem: Pain  Goal: Verbalizes/displays adequate comfort level or baseline comfort level  9/13/2022 1921 by Vianca Marquez RN  Outcome: Progressing  Flowsheets (Taken 9/13/2022 1910 by Tigist Pinto RN)  Verbalizes/displays adequate comfort level or baseline comfort level:   Encourage patient to monitor pain and request assistance   Assess pain using appropriate pain scale   Implement non-pharmacological measures as appropriate and evaluate response     Problem: Safety - Adult  Goal: Free from fall injury  9/13/2022 1921 by Vianca Marquez RN  Outcome: Progressing  Flowsheets (Taken 9/13/2022 1910 by Tigist Pinto RN)  Free From Fall Injury: Instruct family/caregiver on patient safety     Problem: Discharge Planning  Goal: Discharge to home or other facility with appropriate resources  9/13/2022 1921 by Vianca Marquez RN  Outcome: Progressing  Flowsheets (Taken 9/13/2022 1910 by Tigist Pinto RN)  Discharge to home or other facility with appropriate resources: Identify barriers to discharge with patient and caregiver     Care plan reviewed with patient and [place]. Patient and [place] verbalize understanding of the plan of care and contribute to goal setting.

## 2022-09-13 NOTE — FLOWSHEET NOTE
Pt in office earlier today with increased blood pressures. Dr. Timbo Horton called with orders for labs, fetal monitoring, and serial vital signs. Pt denies contractions, leaking of fluids, and bleeding. Pt reports good fetal movement. Pt oriented to room and questions addressed. Pt provided clean gown. Patient to bathroom to void, informed of maternal drug testing policy in place on all laboring patients. Verbal consent received.

## 2022-09-14 LAB — RPR: NONREACTIVE

## 2022-09-14 NOTE — FLOWSHEET NOTE
Dr. Brandon Solares in department. Reviewed CMP, CBC labs. FHT reviewed and VSS. Pt ok for discharge.

## 2022-09-14 NOTE — DISCHARGE INSTRUCTIONS
1,000mg of Tylenol every 6 hours for pain or headache  Ice pack, heating pad, or warm shower can be used for pain relief  Home Undelivered Discharge Instructions    After Discharge Orders:           Diet: regular diet   Increase fluid intake to 8-10 glasses of water daily    Rest: normal activity as tolerated    Other instructions: Do kick counts once a day on your baby. Choose the time of day your baby is most active. Get in a comfortable lying or sitting position and time how long it takes to feel 10 kicks, twists, turns, swishes, or rolls. Call Your Doctor if Any of the Following Occurs   Leaking fluid from vagina with or without contractions  Bright red vaginal bleeding occurs that is as heavy as or heavier than a period  Regular contractions are longer, stronger, and closer together  Noticeable decreased fetal movement  Elevated temperature >100.5°F and chills  Blurred vision, spots before eyes, unrelievable headache, severe facial swelling, or upper abdominal pain  Contact physician or hospital OB unit if signs of premature labor occur at ?36 weeks  Persistent or rhythmic low back pain that feels different than you are used to  Menstrual like cramps  Intestinal cramps with or without diarrhea  Pelvic pressure or rhythmic tightening that feels different than you are used to  [facility] discharge or a gush of fluid from your vagina  Vaginal bleeding as heavy as a period  General Information     Difference between:  False Labor True Labor   1. Contractions often are irregular and don't consistently get closer together (called [place] contractions) 1. Contractions come at regular intervals and, as time goes on, get closer together. 2. Contractions may often stop when you rest or with a position change. 2. Contractions continue despite movement or walking. Contractions get stronger and closer together with time. 3. Often felt in the lower abdomen. 3. Usually felt in back coming around to the front. Reminder to Patient   Please bring all teaching sheets and discharge information with you if you return to the hospital or the physician's office/clinic for follow-up care. If you have any questions, please call: \"[facility]Nurse\" [phone] or [phone]. Reviewed Dates   5/12/2007  Document developed by   Labor and Delivery Discharge Education Form  Disclaimer: We want you to understand more clearly each of the health conditions and procedures you may have. This patient leaflet is a summary of useful information to help you gain a better understanding of these health topics. Other information about this condition or procedure may be important for you to know. Please talk with your healthcare provider for more information about your special health needs. Coronavirus ([id number]): Pregnancy, Birth and Baby Care    What do you need know if you are pregnant regarding coronavirus (COVID-19)? · From what experts know so far, pregnant people do NOT seem more likely than others to get COVID-19 and do NOT have a higher risk of severe complications. What can you do to protect yourself from COVID-19? · Practice social distancing.  When you need to leave the home try to stay at least 6 feet away from other people. Avoid large crowds. · When you leave the house to prevent spreading sickness to others - Wear a facemask covering your mouth and nose. Remove by folding outside of mask together and place in container, wash daily or as soiled. · Wash your hands often by rubbing your hands with soap and water for at least 20 seconds, rinse, and dry with a paper towel that can be thrown away or may use hand  of at least 60% alcohol. Covering all surfaces of your hands by rubbing them together until dry. · Avoid touching your face with unwashed hands, especially your mouth, nose and eyes. · Avoid traveling. What should you do if you have or think you may have COVID-19?   · For most infected, this is a mild illness and you will be able to recover at home. · To avoid spreading to others:  ? Stay home except to get medical care. ? Call a medical facility before you show up. This will help the staff prevent others from being exposed. ? Stay separate from others in your home, at least 6 feet. Use a separate bathroom, if possible. ? Wear a cloth facemask to cover your nose and mouth when you are around other people including at home. ? Clean your hands often. Wash your hands with soap and water for at least 20 seconds. ? Cough or sneeze into a tissue or your arm. Wash your hands immediately after. ? Dont share personal household items including towels and bedding. ? Clean and disinfect objects in your isolation area every day. · If you are in labor and you have, or think you may have COVID-19, call your OB care provider and the hospital birthing unit before you go, so the staff can properly prepare and protect you, your baby and others from being infected. Wear a mask when you leave your home, as you will be asked to continue to wear a mask during your time in the hospital.  · Mother-to-child transmission of COVID-19 during pregnancy is unlikely, but after birth a baby is susceptible to person-to-person spread. ? The determination of whether or not to separate you and your baby at the time of birth will be decided using shared decision-making between you and your clinical team.  ? After your baby is born, your health care provider may recommend you not hold your baby and/or that you stay in a separate room from your baby until you get better. ? If you and your baby are not , wear a facemask at all times and wash your hands thoroughly before touching, holding or feeding your baby.

## 2022-09-14 NOTE — FLOWSHEET NOTE
Dr. Brandon Solares in department. Reviewed BP's, and protein creatin ratio. T reviewed. Waiting on CMP and CBC results. Per Dr. Brandon Solares, if those labs are WNL pt can be discharged home. Will update Dr. Brandon Solares as needed.

## 2022-09-14 NOTE — FLOWSHEET NOTE
Discharge instructions given and reviewed with patient. Informed patient to notify physician or come back to L & D if leaking fluid from vagina or without contractions. Bright reg vaginal bleeding occurs that is as heavy as or heavier than a period. Regular contractions that are 2-5 minutes apart. Elevated temperature >100.5 degree Farenheit and chills. Blurred vision, spots before eyes, unrelievable  Headache, severe facial swelling, or upper abdominal pain. Questions denied, papers signed.

## 2022-09-15 ENCOUNTER — ANESTHESIA (OUTPATIENT)
Dept: LABOR AND DELIVERY | Age: 22
End: 2022-09-15
Payer: COMMERCIAL

## 2022-09-15 ENCOUNTER — ANESTHESIA EVENT (OUTPATIENT)
Dept: LABOR AND DELIVERY | Age: 22
End: 2022-09-15
Payer: COMMERCIAL

## 2022-09-15 ENCOUNTER — HOSPITAL ENCOUNTER (INPATIENT)
Age: 22
LOS: 3 days | Discharge: HOME | End: 2022-09-18
Attending: OBSTETRICS & GYNECOLOGY | Admitting: OBSTETRICS & GYNECOLOGY
Payer: COMMERCIAL

## 2022-09-15 PROBLEM — O99.891 PREGNANCY COMPLICATION BEFORE BIRTH: Status: ACTIVE | Noted: 2022-09-15

## 2022-09-15 LAB
ABO: NORMAL
ALBUMIN SERPL-MCNC: 3.1 G/DL (ref 3.5–5.1)
ALP BLD-CCNC: 82 U/L (ref 38–126)
ALT SERPL-CCNC: 15 U/L (ref 11–66)
AMNISURE PATIENT RESULT: NORMAL
AMPHETAMINE+METHAMPHETAMINE URINE SCREEN: NEGATIVE
ANION GAP SERPL CALCULATED.3IONS-SCNC: 12 MEQ/L (ref 8–16)
ANTIBODY SCREEN: NORMAL
AST SERPL-CCNC: 19 U/L (ref 5–40)
BARBITURATE QUANTITATIVE URINE: NEGATIVE
BENZODIAZEPINE QUANTITATIVE URINE: NEGATIVE
BILIRUB SERPL-MCNC: 0.2 MG/DL (ref 0.3–1.2)
BUN BLDV-MCNC: 6 MG/DL (ref 7–22)
CALCIUM SERPL-MCNC: 9.4 MG/DL (ref 8.5–10.5)
CANNABINOID QUANTITATIVE URINE: NEGATIVE
CHLORIDE BLD-SCNC: 108 MEQ/L (ref 98–111)
CO2: 20 MEQ/L (ref 23–33)
COCAINE METABOLITE QUANTITATIVE URINE: NEGATIVE
CREAT SERPL-MCNC: 0.5 MG/DL (ref 0.4–1.2)
CREATININE URINE: 163.5 MG/DL
ERYTHROCYTE [DISTWIDTH] IN BLOOD BY AUTOMATED COUNT: 13.5 % (ref 11.5–14.5)
ERYTHROCYTE [DISTWIDTH] IN BLOOD BY AUTOMATED COUNT: 46 FL (ref 35–45)
GFR SERPL CREATININE-BSD FRML MDRD: > 90 ML/MIN/1.73M2
GLUCOSE BLD-MCNC: 69 MG/DL (ref 70–108)
HCT VFR BLD CALC: 37.6 % (ref 37–47)
HEMOGLOBIN: 11.9 GM/DL (ref 12–16)
MCH RBC QN AUTO: 29.4 PG (ref 26–33)
MCHC RBC AUTO-ENTMCNC: 31.6 GM/DL (ref 32.2–35.5)
MCV RBC AUTO: 92.8 FL (ref 81–99)
OPIATES, URINE: NEGATIVE
OXYCODONE: NEGATIVE
PHENCYCLIDINE QUANTITATIVE URINE: NEGATIVE
PLATELET # BLD: 321 THOU/MM3 (ref 130–400)
PMV BLD AUTO: 10.3 FL (ref 9.4–12.4)
POTASSIUM SERPL-SCNC: 3.9 MEQ/L (ref 3.5–5.2)
PROT/CREAT RATIO, UR: 0.15
PROTEIN, URINE: 24.2 MG/DL
RBC # BLD: 4.05 MILL/MM3 (ref 4.2–5.4)
RH FACTOR: NORMAL
SODIUM BLD-SCNC: 140 MEQ/L (ref 135–145)
TOTAL PROTEIN: 6.4 G/DL (ref 6.1–8)
WBC # BLD: 11 THOU/MM3 (ref 4.8–10.8)

## 2022-09-15 PROCEDURE — 80307 DRUG TEST PRSMV CHEM ANLYZR: CPT

## 2022-09-15 PROCEDURE — 84156 ASSAY OF PROTEIN URINE: CPT

## 2022-09-15 PROCEDURE — 2580000003 HC RX 258: Performed by: OBSTETRICS & GYNECOLOGY

## 2022-09-15 PROCEDURE — 80053 COMPREHEN METABOLIC PANEL: CPT

## 2022-09-15 PROCEDURE — 82570 ASSAY OF URINE CREATININE: CPT

## 2022-09-15 PROCEDURE — 86850 RBC ANTIBODY SCREEN: CPT

## 2022-09-15 PROCEDURE — 6360000002 HC RX W HCPCS

## 2022-09-15 PROCEDURE — 86900 BLOOD TYPING SEROLOGIC ABO: CPT

## 2022-09-15 PROCEDURE — 6360000002 HC RX W HCPCS: Performed by: OBSTETRICS & GYNECOLOGY

## 2022-09-15 PROCEDURE — 6370000000 HC RX 637 (ALT 250 FOR IP): Performed by: OBSTETRICS & GYNECOLOGY

## 2022-09-15 PROCEDURE — 1220000001 HC SEMI PRIVATE L&D R&B

## 2022-09-15 PROCEDURE — 86901 BLOOD TYPING SEROLOGIC RH(D): CPT

## 2022-09-15 PROCEDURE — 84112 EVAL AMNIOTIC FLUID PROTEIN: CPT

## 2022-09-15 PROCEDURE — 85027 COMPLETE CBC AUTOMATED: CPT

## 2022-09-15 PROCEDURE — 2500000003 HC RX 250 WO HCPCS: Performed by: ANESTHESIOLOGY

## 2022-09-15 PROCEDURE — 3700000025 EPIDURAL BLOCK: Performed by: ANESTHESIOLOGY

## 2022-09-15 PROCEDURE — 86592 SYPHILIS TEST NON-TREP QUAL: CPT

## 2022-09-15 RX ORDER — HYDRALAZINE HYDROCHLORIDE 20 MG/ML
INJECTION INTRAMUSCULAR; INTRAVENOUS
Status: COMPLETED
Start: 2022-09-15 | End: 2022-09-15

## 2022-09-15 RX ORDER — SEVOFLURANE 250 ML/250ML
1 LIQUID RESPIRATORY (INHALATION) CONTINUOUS PRN
Status: DISCONTINUED | OUTPATIENT
Start: 2022-09-15 | End: 2022-09-16

## 2022-09-15 RX ORDER — DOCUSATE SODIUM 100 MG/1
100 CAPSULE, LIQUID FILLED ORAL 2 TIMES DAILY
Status: DISCONTINUED | OUTPATIENT
Start: 2022-09-15 | End: 2022-09-16

## 2022-09-15 RX ORDER — SODIUM CHLORIDE, SODIUM LACTATE, POTASSIUM CHLORIDE, CALCIUM CHLORIDE 600; 310; 30; 20 MG/100ML; MG/100ML; MG/100ML; MG/100ML
INJECTION, SOLUTION INTRAVENOUS CONTINUOUS
Status: DISCONTINUED | OUTPATIENT
Start: 2022-09-15 | End: 2022-09-16

## 2022-09-15 RX ORDER — NIFEDIPINE 10 MG/1
10 CAPSULE ORAL ONCE
Status: COMPLETED | OUTPATIENT
Start: 2022-09-15 | End: 2022-09-15

## 2022-09-15 RX ORDER — ACETAMINOPHEN 325 MG/1
650 TABLET ORAL EVERY 4 HOURS PRN
Status: DISCONTINUED | OUTPATIENT
Start: 2022-09-15 | End: 2022-09-16

## 2022-09-15 RX ORDER — CARBOPROST TROMETHAMINE 250 UG/ML
250 INJECTION, SOLUTION INTRAMUSCULAR PRN
Status: DISCONTINUED | OUTPATIENT
Start: 2022-09-15 | End: 2022-09-16

## 2022-09-15 RX ORDER — TRANEXAMIC ACID 10 MG/ML
1000 INJECTION, SOLUTION INTRAVENOUS
Status: ACTIVE | OUTPATIENT
Start: 2022-09-15 | End: 2022-09-15

## 2022-09-15 RX ORDER — HYDRALAZINE HYDROCHLORIDE 20 MG/ML
5 INJECTION INTRAMUSCULAR; INTRAVENOUS ONCE
Status: COMPLETED | OUTPATIENT
Start: 2022-09-15 | End: 2022-09-15

## 2022-09-15 RX ORDER — BUTORPHANOL TARTRATE 1 MG/ML
1 INJECTION, SOLUTION INTRAMUSCULAR; INTRAVENOUS
Status: DISCONTINUED | OUTPATIENT
Start: 2022-09-15 | End: 2022-09-16

## 2022-09-15 RX ORDER — IBUPROFEN 800 MG/1
800 TABLET ORAL EVERY 8 HOURS PRN
Status: DISCONTINUED | OUTPATIENT
Start: 2022-09-15 | End: 2022-09-16

## 2022-09-15 RX ORDER — BETAMETHASONE SODIUM PHOSPHATE AND BETAMETHASONE ACETATE 3; 3 MG/ML; MG/ML
INJECTION, SUSPENSION INTRA-ARTICULAR; INTRALESIONAL; INTRAMUSCULAR; SOFT TISSUE
Status: COMPLETED
Start: 2022-09-15 | End: 2022-09-15

## 2022-09-15 RX ORDER — BETAMETHASONE SODIUM PHOSPHATE AND BETAMETHASONE ACETATE 3; 3 MG/ML; MG/ML
12 INJECTION, SUSPENSION INTRA-ARTICULAR; INTRALESIONAL; INTRAMUSCULAR; SOFT TISSUE DAILY
Status: DISCONTINUED | OUTPATIENT
Start: 2022-09-15 | End: 2022-09-16

## 2022-09-15 RX ORDER — METHYLERGONOVINE MALEATE 0.2 MG/ML
200 INJECTION INTRAVENOUS PRN
Status: DISCONTINUED | OUTPATIENT
Start: 2022-09-15 | End: 2022-09-16

## 2022-09-15 RX ORDER — MAGNESIUM SULFATE IN WATER 40 MG/ML
INJECTION, SOLUTION INTRAVENOUS
Status: COMPLETED
Start: 2022-09-15 | End: 2022-09-15

## 2022-09-15 RX ORDER — TERBUTALINE SULFATE 1 MG/ML
0.25 INJECTION, SOLUTION SUBCUTANEOUS ONCE
Status: DISCONTINUED | OUTPATIENT
Start: 2022-09-15 | End: 2022-09-16

## 2022-09-15 RX ORDER — LIDOCAINE HYDROCHLORIDE 10 MG/ML
30 INJECTION, SOLUTION EPIDURAL; INFILTRATION; INTRACAUDAL; PERINEURAL PRN
Status: DISCONTINUED | OUTPATIENT
Start: 2022-09-15 | End: 2022-09-16

## 2022-09-15 RX ORDER — EPHEDRINE SULFATE 50 MG/ML
5 INJECTION, SOLUTION INTRAVENOUS PRN
Status: DISCONTINUED | OUTPATIENT
Start: 2022-09-15 | End: 2022-09-16

## 2022-09-15 RX ORDER — DIPHENHYDRAMINE HYDROCHLORIDE 50 MG/ML
25 INJECTION INTRAMUSCULAR; INTRAVENOUS EVERY 4 HOURS PRN
Status: DISCONTINUED | OUTPATIENT
Start: 2022-09-15 | End: 2022-09-16

## 2022-09-15 RX ORDER — ONDANSETRON 2 MG/ML
8 INJECTION INTRAMUSCULAR; INTRAVENOUS EVERY 6 HOURS PRN
Status: DISCONTINUED | OUTPATIENT
Start: 2022-09-15 | End: 2022-09-16

## 2022-09-15 RX ORDER — PENICILLIN G 3000000 [IU]/50ML
3 INJECTION, SOLUTION INTRAVENOUS EVERY 4 HOURS
Status: DISCONTINUED | OUTPATIENT
Start: 2022-09-15 | End: 2022-09-16

## 2022-09-15 RX ORDER — NIFEDIPINE 30 MG/1
30 TABLET, EXTENDED RELEASE ORAL DAILY
Status: DISCONTINUED | OUTPATIENT
Start: 2022-09-15 | End: 2022-09-16

## 2022-09-15 RX ORDER — MISOPROSTOL 200 UG/1
1000 TABLET ORAL PRN
Status: DISCONTINUED | OUTPATIENT
Start: 2022-09-15 | End: 2022-09-16

## 2022-09-15 RX ORDER — ONDANSETRON 2 MG/ML
8 INJECTION INTRAMUSCULAR; INTRAVENOUS EVERY 8 HOURS PRN
Status: DISCONTINUED | OUTPATIENT
Start: 2022-09-15 | End: 2022-09-18 | Stop reason: HOSPADM

## 2022-09-15 RX ADMIN — SODIUM CHLORIDE, POTASSIUM CHLORIDE, SODIUM LACTATE AND CALCIUM CHLORIDE: 600; 310; 30; 20 INJECTION, SOLUTION INTRAVENOUS at 23:30

## 2022-09-15 RX ADMIN — NIFEDIPINE 10 MG: 10 CAPSULE ORAL at 14:47

## 2022-09-15 RX ADMIN — HYDRALAZINE HYDROCHLORIDE 5 MG: 20 INJECTION, SOLUTION INTRAMUSCULAR; INTRAVENOUS at 16:21

## 2022-09-15 RX ADMIN — BETAMETHASONE SODIUM PHOSPHATE AND BETAMETHASONE ACETATE 12 MG: 3; 3 INJECTION, SUSPENSION INTRA-ARTICULAR; INTRALESIONAL; INTRAMUSCULAR at 16:37

## 2022-09-15 RX ADMIN — HYDRALAZINE HYDROCHLORIDE 5 MG: 20 INJECTION INTRAMUSCULAR; INTRAVENOUS at 16:21

## 2022-09-15 RX ADMIN — DEXTROSE MONOHYDRATE 5 MILLION UNITS: 5 INJECTION INTRAVENOUS at 17:03

## 2022-09-15 RX ADMIN — Medication 18 ML/HR: at 23:08

## 2022-09-15 RX ADMIN — MAGNESIUM SULFATE HEPTAHYDRATE 4000 MG: 40 INJECTION, SOLUTION INTRAVENOUS at 16:56

## 2022-09-15 RX ADMIN — SODIUM CHLORIDE, POTASSIUM CHLORIDE, SODIUM LACTATE AND CALCIUM CHLORIDE: 600; 310; 30; 20 INJECTION, SOLUTION INTRAVENOUS at 15:08

## 2022-09-15 RX ADMIN — Medication 1 MILLI-UNITS/MIN: at 17:13

## 2022-09-15 RX ADMIN — PENICILLIN G 3 MILLION UNITS: 3000000 INJECTION, SOLUTION INTRAVENOUS at 20:24

## 2022-09-15 RX ADMIN — NIFEDIPINE 30 MG: 30 TABLET, FILM COATED, EXTENDED RELEASE ORAL at 19:42

## 2022-09-15 RX ADMIN — BETAMETHASONE SODIUM PHOSPHATE AND BETAMETHASONE ACETATE 12 MG: 3; 3 INJECTION, SUSPENSION INTRA-ARTICULAR; INTRALESIONAL; INTRAMUSCULAR; SOFT TISSUE at 16:37

## 2022-09-15 RX ADMIN — Medication: at 17:22

## 2022-09-15 ASSESSMENT — PAIN DESCRIPTION - DESCRIPTORS: DESCRIPTORS: CRAMPING

## 2022-09-15 NOTE — PLAN OF CARE
Problem: Vaginal Birth or  Section  Goal: Fetal and maternal status remain reassuring during the birth process  Description:  Birth OB-Pregnancy care plan goal which identifies if the fetal and maternal status remain reassuring during the birth process  Flowsheets (Taken 9/15/2022 1825)  Fetal and Maternal Status Remain Reassuring During the Birth Process: Monitor vital signs     Problem: Pain  Goal: Verbalizes/displays adequate comfort level or baseline comfort level  Flowsheets (Taken 9/15/2022 1825)  Verbalizes/displays adequate comfort level or baseline comfort level: Encourage patient to monitor pain and request assistance     Problem: Infection - Adult  Goal: Absence of infection at discharge  Flowsheets (Taken 9/15/2022 1825)  Absence of infection at discharge: Assess and monitor for signs and symptoms of infection     Problem: Safety - Adult  Goal: Free from fall injury  Flowsheets (Taken 9/15/2022 1825)  Free From Fall Injury: Instruct family/caregiver on patient safety     Problem: Discharge Planning  Goal: Discharge to home or other facility with appropriate resources  Flowsheets (Taken 9/15/2022 1825)  Discharge to home or other facility with appropriate resources: Identify barriers to discharge with patient and caregiver     Problem: Chronic Conditions and Co-morbidities  Goal: Patient's chronic conditions and co-morbidity symptoms are monitored and maintained or improved  Flowsheets (Taken 9/15/2022 1825)  Care Plan - Patient's Chronic Conditions and Co-Morbidity Symptoms are Monitored and Maintained or Improved: Monitor and assess patient's chronic conditions and comorbid symptoms for stability, deterioration, or improvement     Problem: Vaginal Birth or  Section  Goal: Fetal and maternal status remain reassuring during the birth process  Description:  Birth OB-Pregnancy care plan goal which identifies if the fetal and maternal status remain reassuring during the birth process  Flowsheets (Taken 9/15/2022 1825)  Fetal and Maternal Status Remain Reassuring During the Birth Process: Monitor vital signs     Problem: Pain  Goal: Verbalizes/displays adequate comfort level or baseline comfort level  Flowsheets (Taken 9/15/2022 1825)  Verbalizes/displays adequate comfort level or baseline comfort level: Encourage patient to monitor pain and request assistance     Problem: Infection - Adult  Goal: Absence of infection at discharge  Flowsheets (Taken 9/15/2022 1825)  Absence of infection at discharge: Assess and monitor for signs and symptoms of infection     Problem: Safety - Adult  Goal: Free from fall injury  Flowsheets (Taken 9/15/2022 1825)  Free From Fall Injury: Instruct family/caregiver on patient safety     Problem: Discharge Planning  Goal: Discharge to home or other facility with appropriate resources  Flowsheets (Taken 9/15/2022 1825)  Discharge to home or other facility with appropriate resources: Identify barriers to discharge with patient and caregiver     Problem: Chronic Conditions and Co-morbidities  Goal: Patient's chronic conditions and co-morbidity symptoms are monitored and maintained or improved  Flowsheets (Taken 9/15/2022 1825)  Care Plan - Patient's Chronic Conditions and Co-Morbidity Symptoms are Monitored and Maintained or Improved: Monitor and assess patient's chronic conditions and comorbid symptoms for stability, deterioration, or improvement   Care plan reviewed with patient and family. Patient and family verbalize understanding of the plan of care and contribute to goal setting.

## 2022-09-15 NOTE — PLAN OF CARE
Problem: Vaginal Birth or  Section  Goal: Fetal and maternal status remain reassuring during the birth process  Description:  Birth OB-Pregnancy care plan goal which identifies if the fetal and maternal status remain reassuring during the birth process  9/15/2022 1932 by Osbaldo Agustin RN  Outcome: Progressing  Flowsheets (Taken 9/15/2022 1932)  Fetal and Maternal Status Remain Reassuring During the Birth Process:   Monitor vital signs   Monitor fetal heart rate   Monitor uterine activity   Monitor labor progression (Vaginal delivery)  9/15/2022 1825 by Julian Alan RN  Flowsheets (Taken 9/15/2022 1825)  Fetal and Maternal Status Remain Reassuring During the Birth Process: Monitor vital signs     Problem: Pain  Goal: Verbalizes/displays adequate comfort level or baseline comfort level  9/15/2022 1932 by Osbaldo Agustin RN  Outcome: Progressing  Flowsheets (Taken 9/15/2022 1932)  Verbalizes/displays adequate comfort level or baseline comfort level:   Encourage patient to monitor pain and request assistance   Assess pain using appropriate pain scale   Administer analgesics based on type and severity of pain and evaluate response  9/15/2022 1825 by Julian Alan RN  Flowsheets (Taken 9/15/2022 1825)  Verbalizes/displays adequate comfort level or baseline comfort level: Encourage patient to monitor pain and request assistance     Problem: Infection - Adult  Goal: Absence of infection at discharge  9/15/2022 1932 by Osbaldo Agustin RN  Outcome: Progressing  Flowsheets (Taken 9/15/2022 1932)  Absence of infection at discharge:   Monitor all insertion sites i.e., indwelling lines, tubes and drains   Assess and monitor for signs and symptoms of infection   Monitor lab/diagnostic results  9/15/2022 1825 by Julian Alan RN  Flowsheets (Taken 9/15/2022 1825)  Absence of infection at discharge: Assess and monitor for signs and symptoms of infection     Problem: Safety - Adult  Goal: Free from fall injury  9/15/2022 1932 by Valencia Kaiser RN  Outcome: Progressing  Flowsheets  Taken 9/15/2022 1932 by Valencia Kaiser RN  Free From Fall Injury: Instruct family/caregiver on patient safety  Taken 9/15/2022 1828 by Louie Edmondson RN  Free From Fall Injury: Instruct family/caregiver on patient safety  9/15/2022 1825 by Louie Edmondson RN  Flowsheets (Taken 9/15/2022 1825)  Free From Fall Injury: Instruct family/caregiver on patient safety     Problem: Discharge Planning  Goal: Discharge to home or other facility with appropriate resources  9/15/2022 1932 by Valencia Kaiser RN  Outcome: Progressing  Flowsheets (Taken 9/15/2022 1932)  Discharge to home or other facility with appropriate resources:   Identify barriers to discharge with patient and caregiver   Arrange for needed discharge resources and transportation as appropriate   Identify discharge learning needs (meds, wound care, etc)  9/15/2022 1825 by Louie Edmondson RN  Flowsheets (Taken 9/15/2022 1825)  Discharge to home or other facility with appropriate resources: Identify barriers to discharge with patient and caregiver     Problem: Chronic Conditions and Co-morbidities  Goal: Patient's chronic conditions and co-morbidity symptoms are monitored and maintained or improved  9/15/2022 1932 by Valencia Kaiser RN  Outcome: Progressing  Flowsheets (Taken 9/15/2022 1932)  Care Plan - Patient's Chronic Conditions and Co-Morbidity Symptoms are Monitored and Maintained or Improved:   Monitor and assess patient's chronic conditions and comorbid symptoms for stability, deterioration, or improvement   Collaborate with multidisciplinary team to address chronic and comorbid conditions and prevent exacerbation or deterioration  9/15/2022 1825 by Louie Edmondson RN  Flowsheets (Taken 9/15/2022 1825)  Care Plan - Patient's Chronic Conditions and Co-Morbidity Symptoms are Monitored and Maintained or Improved: Monitor and assess patient's chronic conditions and comorbid symptoms for stability, deterioration, or improvement   Care plan reviewed with patient and FOB. Patient and FOB verbalize understanding of the plan of care and contribute to goal setting.

## 2022-09-15 NOTE — H&P
[id number] [place] [address]  History and Physical Update    Pt Name: Fang Dexter  MRN: [id number]  YOB: 2000  Date of evaluation: 9/15/2022    [] I have examined the patient and reviewed the H&P/Consult and there are no changes to the patient or plans. [x] I have examined the patient and reviewed the H&P/Consult and have noted the following changes:    23yo  at 36/5 presents  With c/o dec fm. While here, BPs noted to be in severe range. Pt has intermittent headache. She had one earlier today which resolved on its own. Denies vision changes/n/v.  [id number] on arrival.   CE: /2. FSE applied without difficulty- Clear fluid noted. Pt has been given procardia 10mg which BPs responded by going to mild range. Await preE labs. 18yo  at 36/5 with severe gestational HTN, preE labs pending  1) Celestone course  2) GBS in urine earlier in pregnancy- on PCN  3) Magnesium sulfate  4) Pt has now been given hydralazine 5mg IV. Will continue to monitor BPs closely. 5) Induction via pitocin      Discussion with the patient and/ or family for proposed care, treatment, services; benefits, risks, side effects; likelihood of achieving goals and potential problems that may occur during recuperation was had and all questions were answered. Discussion with the patient and/ or family of reasonable alternatives to the proposed care, treatment, services and the discussion of the risks, benefits, side effects related to the alternatives and the risk related to not receiving the proposed care treatment services was also had and all questions were answered. If this is for an elective surgical procedure then The patient was counseled at length about the risks of lucian Covid-19 during their perioperative period and any recovery window from their procedure.   The patient was made aware that lucian Covid-19  may worsen their prognosis for recovering from their procedure  and lend to a higher morbidity and/or mortality risk. All material risks, benefits, and reasonable alternatives including postponing the procedure were discussed. The patient  does wish to proceed with the procedure at this time.              Luis Daniel Be MD,MD  Electronically signed 9/15/2022 at 4:35 PM

## 2022-09-15 NOTE — FLOWSHEET NOTE
Dr. Emily Castillo updated on patient status. Patient initially came in with c/o decreased fetal movement. Patient is now feeling baby move. Baby is reactive and she isn't c/o ctx, just pressure. Patient also thought maybe her water broke, amnisure was negative. SVE 1 and unknown fetal presentation. Patient has no c/o n/v, headache or visual disturbances. Reflexes are +2. Blood pressures reviewed. Orders received.

## 2022-09-15 NOTE — FLOWSHEET NOTE
Patient arrived to the unit via wheelchair from the E.D with c/o decreased fetal movement since 11 am. Patient thinks she may have some leakage of fluid, no bleeding and no c/o ctx. Patient oriented to room, gowned and placed in bed.

## 2022-09-16 LAB — RPR: NONREACTIVE

## 2022-09-16 PROCEDURE — 0KQM0ZZ REPAIR PERINEUM MUSCLE, OPEN APPROACH: ICD-10-PCS | Performed by: OBSTETRICS & GYNECOLOGY

## 2022-09-16 PROCEDURE — 1220000001 HC SEMI PRIVATE L&D R&B

## 2022-09-16 PROCEDURE — 7200000001 HC VAGINAL DELIVERY

## 2022-09-16 PROCEDURE — 6370000000 HC RX 637 (ALT 250 FOR IP): Performed by: OBSTETRICS & GYNECOLOGY

## 2022-09-16 PROCEDURE — 6360000002 HC RX W HCPCS

## 2022-09-16 PROCEDURE — 2580000003 HC RX 258: Performed by: OBSTETRICS & GYNECOLOGY

## 2022-09-16 PROCEDURE — 6360000002 HC RX W HCPCS: Performed by: NURSE ANESTHETIST, CERTIFIED REGISTERED

## 2022-09-16 PROCEDURE — 10907ZC DRAINAGE OF AMNIOTIC FLUID, THERAPEUTIC FROM PRODUCTS OF CONCEPTION, VIA NATURAL OR ARTIFICIAL OPENING: ICD-10-PCS | Performed by: OBSTETRICS & GYNECOLOGY

## 2022-09-16 PROCEDURE — 3E033VJ INTRODUCTION OF OTHER HORMONE INTO PERIPHERAL VEIN, PERCUTANEOUS APPROACH: ICD-10-PCS | Performed by: OBSTETRICS & GYNECOLOGY

## 2022-09-16 PROCEDURE — 6360000002 HC RX W HCPCS: Performed by: OBSTETRICS & GYNECOLOGY

## 2022-09-16 RX ORDER — ROPIVACAINE HYDROCHLORIDE 2 MG/ML
INJECTION, SOLUTION EPIDURAL; INFILTRATION; PERINEURAL
Status: COMPLETED
Start: 2022-09-16 | End: 2022-09-16

## 2022-09-16 RX ORDER — SODIUM CHLORIDE 0.9 % (FLUSH) 0.9 %
5-40 SYRINGE (ML) INJECTION PRN
Status: DISCONTINUED | OUTPATIENT
Start: 2022-09-16 | End: 2022-09-18 | Stop reason: HOSPADM

## 2022-09-16 RX ORDER — FERROUS SULFATE 325(65) MG
325 TABLET ORAL
Status: DISCONTINUED | OUTPATIENT
Start: 2022-09-17 | End: 2022-09-18 | Stop reason: HOSPADM

## 2022-09-16 RX ORDER — SODIUM CHLORIDE 9 MG/ML
INJECTION, SOLUTION INTRAVENOUS PRN
Status: DISCONTINUED | OUTPATIENT
Start: 2022-09-16 | End: 2022-09-18 | Stop reason: HOSPADM

## 2022-09-16 RX ORDER — ROPIVACAINE HYDROCHLORIDE 2 MG/ML
INJECTION, SOLUTION EPIDURAL; INFILTRATION; PERINEURAL PRN
Status: DISCONTINUED | OUTPATIENT
Start: 2022-09-16 | End: 2022-09-16 | Stop reason: SDUPTHER

## 2022-09-16 RX ORDER — MODIFIED LANOLIN
OINTMENT (GRAM) TOPICAL PRN
Status: DISCONTINUED | OUTPATIENT
Start: 2022-09-16 | End: 2022-09-18 | Stop reason: HOSPADM

## 2022-09-16 RX ORDER — OXYCODONE HYDROCHLORIDE 5 MG/1
5 TABLET ORAL EVERY 6 HOURS PRN
Status: DISCONTINUED | OUTPATIENT
Start: 2022-09-16 | End: 2022-09-18 | Stop reason: HOSPADM

## 2022-09-16 RX ORDER — CARBOPROST TROMETHAMINE 250 UG/ML
250 INJECTION, SOLUTION INTRAMUSCULAR PRN
Status: DISCONTINUED | OUTPATIENT
Start: 2022-09-16 | End: 2022-09-18 | Stop reason: HOSPADM

## 2022-09-16 RX ORDER — ACETAMINOPHEN 500 MG
1000 TABLET ORAL EVERY 8 HOURS PRN
Status: DISCONTINUED | OUTPATIENT
Start: 2022-09-16 | End: 2022-09-18 | Stop reason: HOSPADM

## 2022-09-16 RX ORDER — SODIUM CHLORIDE 0.9 % (FLUSH) 0.9 %
5-40 SYRINGE (ML) INJECTION EVERY 12 HOURS SCHEDULED
Status: DISCONTINUED | OUTPATIENT
Start: 2022-09-16 | End: 2022-09-18 | Stop reason: HOSPADM

## 2022-09-16 RX ORDER — ONDANSETRON 4 MG/1
8 TABLET, ORALLY DISINTEGRATING ORAL EVERY 8 HOURS PRN
Status: DISCONTINUED | OUTPATIENT
Start: 2022-09-16 | End: 2022-09-18 | Stop reason: HOSPADM

## 2022-09-16 RX ORDER — BISACODYL 10 MG
10 SUPPOSITORY, RECTAL RECTAL DAILY PRN
Status: DISCONTINUED | OUTPATIENT
Start: 2022-09-16 | End: 2022-09-18 | Stop reason: HOSPADM

## 2022-09-16 RX ORDER — GABAPENTIN 100 MG/1
200 CAPSULE ORAL 2 TIMES DAILY
Status: DISCONTINUED | OUTPATIENT
Start: 2022-09-16 | End: 2022-09-16

## 2022-09-16 RX ORDER — FENTANYL CITRATE 50 UG/ML
INJECTION, SOLUTION INTRAMUSCULAR; INTRAVENOUS PRN
Status: DISCONTINUED | OUTPATIENT
Start: 2022-09-16 | End: 2022-09-16 | Stop reason: SDUPTHER

## 2022-09-16 RX ORDER — GABAPENTIN 100 MG/1
200 CAPSULE ORAL 2 TIMES DAILY
Status: DISCONTINUED | OUTPATIENT
Start: 2022-09-16 | End: 2022-09-18 | Stop reason: HOSPADM

## 2022-09-16 RX ORDER — FENTANYL CITRATE 50 UG/ML
INJECTION, SOLUTION INTRAMUSCULAR; INTRAVENOUS
Status: COMPLETED
Start: 2022-09-16 | End: 2022-09-16

## 2022-09-16 RX ORDER — MISOPROSTOL 200 UG/1
800 TABLET ORAL PRN
Status: DISCONTINUED | OUTPATIENT
Start: 2022-09-16 | End: 2022-09-18 | Stop reason: HOSPADM

## 2022-09-16 RX ORDER — IBUPROFEN 800 MG/1
800 TABLET ORAL EVERY 8 HOURS PRN
Status: DISCONTINUED | OUTPATIENT
Start: 2022-09-16 | End: 2022-09-18 | Stop reason: HOSPADM

## 2022-09-16 RX ORDER — DOCUSATE SODIUM 100 MG/1
100 CAPSULE, LIQUID FILLED ORAL 2 TIMES DAILY PRN
Status: DISCONTINUED | OUTPATIENT
Start: 2022-09-16 | End: 2022-09-18 | Stop reason: HOSPADM

## 2022-09-16 RX ADMIN — MAGNESIUM SULFATE HEPTAHYDRATE 20 G: 40 INJECTION, SOLUTION INTRAVENOUS at 03:38

## 2022-09-16 RX ADMIN — MAGNESIUM SULFATE HEPTAHYDRATE 2000 MG/HR: 40 INJECTION, SOLUTION INTRAVENOUS at 23:20

## 2022-09-16 RX ADMIN — GABAPENTIN 200 MG: 100 CAPSULE ORAL at 21:36

## 2022-09-16 RX ADMIN — DIPHENHYDRAMINE HYDROCHLORIDE 25 MG: 50 INJECTION INTRAMUSCULAR; INTRAVENOUS at 09:19

## 2022-09-16 RX ADMIN — IBUPROFEN 800 MG: 800 TABLET, FILM COATED ORAL at 15:55

## 2022-09-16 RX ADMIN — Medication 166.7 ML: at 12:29

## 2022-09-16 RX ADMIN — ROPIVACAINE HYDROCHLORIDE 10 ML: 2 INJECTION, SOLUTION EPIDURAL; INFILTRATION at 04:16

## 2022-09-16 RX ADMIN — MAGNESIUM SULFATE HEPTAHYDRATE 20 G: 40 INJECTION, SOLUTION INTRAVENOUS at 13:52

## 2022-09-16 RX ADMIN — DIPHENHYDRAMINE HYDROCHLORIDE 25 MG: 50 INJECTION INTRAMUSCULAR; INTRAVENOUS at 01:09

## 2022-09-16 RX ADMIN — PENICILLIN G 3 MILLION UNITS: 3000000 INJECTION, SOLUTION INTRAVENOUS at 00:21

## 2022-09-16 RX ADMIN — PENICILLIN G 3 MILLION UNITS: 3000000 INJECTION, SOLUTION INTRAVENOUS at 09:19

## 2022-09-16 RX ADMIN — PENICILLIN G 3 MILLION UNITS: 3000000 INJECTION, SOLUTION INTRAVENOUS at 04:17

## 2022-09-16 RX ADMIN — OXYCODONE 5 MG: 5 TABLET ORAL at 16:48

## 2022-09-16 RX ADMIN — SODIUM CHLORIDE, POTASSIUM CHLORIDE, SODIUM LACTATE AND CALCIUM CHLORIDE: 600; 310; 30; 20 INJECTION, SOLUTION INTRAVENOUS at 04:12

## 2022-09-16 RX ADMIN — ONDANSETRON 8 MG: 2 INJECTION INTRAMUSCULAR; INTRAVENOUS at 06:50

## 2022-09-16 RX ADMIN — ROPIVACAINE HYDROCHLORIDE 8 ML: 2 INJECTION, SOLUTION EPIDURAL; INFILTRATION at 10:00

## 2022-09-16 RX ADMIN — FENTANYL CITRATE 100 MCG: 50 INJECTION, SOLUTION INTRAMUSCULAR; INTRAVENOUS at 04:15

## 2022-09-16 ASSESSMENT — PAIN DESCRIPTION - LOCATION
LOCATION: HIP
LOCATION: HIP

## 2022-09-16 ASSESSMENT — PAIN DESCRIPTION - ORIENTATION
ORIENTATION: LEFT
ORIENTATION: LEFT

## 2022-09-16 ASSESSMENT — PAIN DESCRIPTION - DESCRIPTORS
DESCRIPTORS: CRAMPING
DESCRIPTORS: ACHING
DESCRIPTORS: CRAMPING

## 2022-09-16 ASSESSMENT — PAIN SCALES - GENERAL
PAINLEVEL_OUTOF10: 6
PAINLEVEL_OUTOF10: 6

## 2022-09-16 NOTE — FLOWSHEET NOTE
Dr. Justine Benson returned page. Updated on SVE, reactive strip and vitals. Will continue with POC. No further questions or concerns at this time.

## 2022-09-16 NOTE — L&D DELIVERY NOTE
Department of Obstetrics and Gynecology  Vacuum Vaginal Delivery Note        Pre-operative Diagnosis:   pregnancy <37 weeks, Induced labor, Single fetus, and Pregnancy complicated by: Severe gestational hypertension    Post-operative Diagnosis: Same, delivered. Procedure:  Vacuum assisted vaginal Delivery    Surgeon:  Sancho Petit MD     Information for the patient's :  Triston Panchal [[id number]]        Anesthesia:  epidural anesthesia    Estimated blood loss:  954 ml    Complications:  shoulder dystocia    Condition:  infant stable to special care nursery and mother stable    Delivery Summary:       The patient is a 25 y.o.   OB History          2    Para   1    Term           1    AB        Living   1         SAB        IAB        Ectopic        Molar        Multiple        Live Births   1             at 36w6d who was admitted for induction. She received the following interventions: ARBOW and IV Pitocin induction. The patient progressed well,did receive an epidural, became complete and started to push. She was placed in the dorsal lithotomy position and prepped. Decision was made to proceed with vacuum delivery due to fetal intolerance of pushing. Pelvic exam revealed fetus in OA position. Pelvis was thought to be adequate and the patient was consented. The vacuum was applied to the fetal head approximately 3cm anterior to the posterior fontanelle. EFW 7pound 12 oz on last US. Finger sweep occurred to ensure no vaginal tissue was trapped with the vacuum. Suction was activated with the next contraction. She pushed 1 contractions with adequate descent prior to the delivery of the fetal head. Suction was removed in between contractions. The were 0 pop-offs. She delivered the baby's head and a shoulder dystocia was identified. Suprapubic pressure was applied after the head was lowered and patient placed into Peg.  She was then instructed to push- the shoulder did not deliver. Pressure was then applied to the posterior side of the anterior (left) shoulder which rotated the baby. She was then instructed to push and the rest of the baby deliver. This lasted around 30 seconds. The baby was then placed on mother abdomen. Cord was clamped and cut and infant handed off to the waiting nurse for evaluation. The placenta delivered intact, whole and that the umbilical cord had three vessels noted. The perineum and vagina were explored and a second degree laceration was repaired with Vicryl 3.0. Vaginal sweep was performed at the conclusion of delivery and all needles were taken off the field. Sponge counts correct.       PMH:  Past Medical History:   Diagnosis Date    Polycystic ovary disease          Rosie Munoz MD 9/16/2022 12:44 PM

## 2022-09-16 NOTE — FLOWSHEET NOTE
Dr Taveras Matters notified pt having left leg pain. Pt stating that pain meds are not helping. Notified this nurse also tried heat/ice and repositioning. Order's rec'd.

## 2022-09-16 NOTE — FLOWSHEET NOTE
Judith Reddy phoned unit for updated. Informed her of reactive strip, SVE and stable vitals. Will continue with POC. No new orders or concerns at this time.

## 2022-09-16 NOTE — ANESTHESIA PRE PROCEDURE
Department of Anesthesiology  Preprocedure Note       Name:  Kim Castillo   Age:  25 y.o.  :  2000                                          MRN:  [id number]         Date:  9/15/2022      Surgeon: * No surgeons listed *    Procedure: * No procedures listed *    Medications prior to admission:   Prior to Admission medications    Medication Sig Start Date End Date Taking?  Authorizing Provider   Prenatal Vit-Fe Fumarate-FA (PRENATAL 1+1 PO) Take by mouth    Historical Provider, MD   Prenatal MV-Min-Fe Fum-FA-DHA (PRENATAL 1 PO) Take by mouth    Historical Provider, MD       Current medications:    Current Facility-Administered Medications   Medication Dose Route Frequency Provider Last Rate Last Admin    lactated ringers infusion   IntraVENous Continuous Mehdi Hughes  mL/hr at 09/15/22 2200 Rate Verify at 09/15/22 2200    ondansetron (ZOFRAN) injection 8 mg  8 mg IntraVENous Q8H PRN Mehdi Hughes MD        docusate sodium (COLACE) capsule 100 mg  100 mg Oral BID Mehdi Hughes MD        oxytocin (PITOCIN) 30 units in 500 mL infusion  1 omar-units/min IntraVENous Continuous Mehdi Hughes MD 4 mL/hr at 09/15/22 2200 4 omar-units/min at 09/15/22 2200    terbutaline (BRETHINE) injection 0.25 mg  0.25 mg SubCUTAneous Once Mehdi Hughes MD        lidocaine PF 1 % injection 30 mL  30 mL Other PRN Mehdi Hughes MD        butorphanol (STADOL) injection 1 mg  1 mg IntraVENous Q2H PRN Mehdi Hughes MD        nitrous oxide 50% inhalation 1 each  1 each Inhalation Continuous PRN Mehdi Hughes MD        ondansetron [facility]) injection 8 mg  8 mg IntraVENous Q6H PRN Mehdi Hughes MD        diphenhydrAMINE (BENADRYL) injection 25 mg  25 mg IntraVENous Q4H PRN Mehdi Hughes MD        methylergonovine (METHERGINE) injection 200 mcg  200 mcg IntraMUSCular PRN Mehdi Hughes MD        carboprost (HEMABATE) injection 250 mcg  250 mcg IntraMUSCular PRN MD Anni Camilo miSOPROStol (CYTOTEC) tablet 1,000 mcg  1,000 mcg Rectal PRN Tracey Euceda MD        tranexamic acid-NaCl IVPB premix 1,000 mg  1,000 mg IntraVENous Once PRN Tracey Euceda MD        acetaminophen (TYLENOL) tablet 650 mg  650 mg Oral Q4H PRN Tracey Euceda MD        ibuprofen (ADVIL;MOTRIN) tablet 800 mg  800 mg Oral Q8H PRN Tracey Euceda MD        witch hazel-glycerin (TUCKS) pad   Topical PRN Tracey Euceda MD        benzocaine-menthol (DERMOPLAST) 20-0.5 % spray   Topical PRN Tracey Euceda MD        penicillin G potassium IVPB 3 Million Units  3 Million Units IntraVENous Q4H Tracey Euceda  mL/hr at 09/15/22 2024 3 Million Units at 09/15/22 2024    betamethasone acetate-betamethasone sodium phosphate (CELESTONE) injection 12 mg  12 mg IntraMUSCular Daily Tracey Euceda MD   12 mg at 09/15/22 1637    NIFEdipine (PROCARDIA XL) extended release tablet 30 mg  30 mg Oral Daily Tracey Euceda MD   30 mg at 09/15/22 1942    fentaNYL 750 mcg, ropivacaine 0.1% in sodium chloride 0.9% 265mL (OB) epidural  18 mL/hr Epidural Continuous Monica Crowley DO        ePHEDrine injection 5 mg  5 mg IntraVENous PRN Monica Crowley DO           Allergies:  No Known Allergies    Problem List:    Patient Active Problem List   Diagnosis Code    Obesity E66.9    Pre-diabetes R73.03    Bleeding R58    Vaginal spotting N93.9    Fall at home, initial encounter W19. Eliezer Fairchild, Y92.009    Pregnancy complication, antepartum O26.90    Pregnancy complication before birth O80.65    Vaginal delivery O80       Past Medical History:        Diagnosis Date    Polycystic ovary disease        Past Surgical History:        Procedure Laterality Date    CYST REMOVAL Right     wrist    TONSILLECTOMY AND ADENOIDECTOMY         Social History:    Social History     Tobacco Use    Smoking status: Never    Smokeless tobacco: Never   Substance Use Topics    Alcohol use:  No                                Counseling given: Not Answered      Vital Signs (Current):   Vitals:    09/15/22 2045 09/15/22 2100 09/15/22 2200 09/15/22 2215   BP:  139/66 (!) 144/97    Pulse:  98 99    Resp:  18 18    Temp:       TempSrc:       SpO2: 100% 100% 100% 100%   Weight:       Height:                                                  BP Readings from Last 3 Encounters:   09/15/22 (!) 144/97   09/13/22 106/63   08/23/22 132/75       NPO Status:                                                                                 BMI:   Wt Readings from Last 3 Encounters:   09/15/22 (!) 396 lb (179.6 kg)   09/13/22 (!) 396 lb (179.6 kg)   08/23/22 (!) 370 lb (167.8 kg)     Body mass index is 63.92 kg/m². CBC:   Lab Results   Component Value Date/Time    WBC 11.0 09/15/2022 03:00 PM    RBC 4.05 09/15/2022 03:00 PM    HGB 11.9 09/15/2022 03:00 PM    HCT 37.6 09/15/2022 03:00 PM    MCV 92.8 09/15/2022 03:00 PM    RDW 12.1 12/22/2021 10:55 AM     09/15/2022 03:00 PM       CMP:   Lab Results   Component Value Date/Time     09/15/2022 03:00 PM    K 3.9 09/15/2022 03:00 PM     09/15/2022 03:00 PM    CO2 20 09/15/2022 03:00 PM    BUN 6 09/15/2022 03:00 PM    CREATININE 0.5 09/15/2022 03:00 PM    GFRAA >60 12/22/2021 10:55 AM    LABGLOM >90 09/15/2022 03:00 PM    GLUCOSE 69 09/15/2022 03:00 PM    PROT 6.4 09/15/2022 03:00 PM    CALCIUM 9.4 09/15/2022 03:00 PM    BILITOT 0.2 09/15/2022 03:00 PM    ALKPHOS 82 09/15/2022 03:00 PM    AST 19 09/15/2022 03:00 PM    ALT 15 09/15/2022 03:00 PM       POC Tests: No results for input(s): POCGLU, POCNA, POCK, POCCL, POCBUN, POCHEMO, POCHCT in the last 72 hours.     Coags: No results found for: PROTIME, INR, APTT    HCG (If Applicable):   Lab Results   Component Value Date    PREGSERUM POSITIVE 06/29/2020        ABGs: No results found for: PHART, PO2ART, NOV4VNH, OWY7TYT, BEART, O7MDBDRT     Type & Screen (If Applicable):  Lab Results   Component Value Date    LABRH POS 09/15/2022       Drug/Infectious Status (If Applicable):  No results found for: HIV, HEPCAB    COVID-19 Screening (If Applicable):   Lab Results   Component Value Date/Time    COVID19 NOT  DETECTED 12/25/2021 10:38 PM    COVID19 DETECTED 09/29/2021 05:30 PM           Anesthesia Evaluation  Patient summary reviewed  Airway: Mallampati: III  TM distance: >3 FB   Neck ROM: full  Mouth opening: > = 3 FB   Dental:          Pulmonary:                              Cardiovascular:                      Neuro/Psych:               GI/Hepatic/Renal:   (+) morbid obesity          Endo/Other:                     Abdominal:             Vascular: Other Findings:           Anesthesia Plan      epidural     ASA 3             Anesthetic plan and risks discussed with patient. Trevor Check.  [facility],    9/15/2022

## 2022-09-16 NOTE — FLOWSHEET NOTE
Dr. Denita Lau returned paged. Updated on SVE, Fetal strip and patient vitals. Will continue with POC. No further orders or questions at this time.

## 2022-09-16 NOTE — FLOWSHEET NOTE
Pt c/o left thigh pain. Pain medications given and pillows placed between legs. K pad applied to affected area. Will continue to monitor.

## 2022-09-17 LAB — HEMOGLOBIN: 10.9 GM/DL (ref 12–16)

## 2022-09-17 PROCEDURE — 6370000000 HC RX 637 (ALT 250 FOR IP): Performed by: OBSTETRICS & GYNECOLOGY

## 2022-09-17 PROCEDURE — 2580000003 HC RX 258: Performed by: OBSTETRICS & GYNECOLOGY

## 2022-09-17 PROCEDURE — 6360000002 HC RX W HCPCS: Performed by: OBSTETRICS & GYNECOLOGY

## 2022-09-17 PROCEDURE — 85018 HEMOGLOBIN: CPT

## 2022-09-17 PROCEDURE — 1200000000 HC SEMI PRIVATE

## 2022-09-17 PROCEDURE — 36415 COLL VENOUS BLD VENIPUNCTURE: CPT

## 2022-09-17 RX ORDER — SODIUM CHLORIDE, SODIUM LACTATE, POTASSIUM CHLORIDE, CALCIUM CHLORIDE 600; 310; 30; 20 MG/100ML; MG/100ML; MG/100ML; MG/100ML
INJECTION, SOLUTION INTRAVENOUS CONTINUOUS
Status: DISCONTINUED | OUTPATIENT
Start: 2022-09-17 | End: 2022-09-18 | Stop reason: HOSPADM

## 2022-09-17 RX ORDER — NIFEDIPINE 30 MG/1
30 TABLET, EXTENDED RELEASE ORAL DAILY
Status: DISCONTINUED | OUTPATIENT
Start: 2022-09-17 | End: 2022-09-18 | Stop reason: HOSPADM

## 2022-09-17 RX ADMIN — OXYCODONE 5 MG: 5 TABLET ORAL at 13:54

## 2022-09-17 RX ADMIN — NIFEDIPINE 30 MG: 30 TABLET, FILM COATED, EXTENDED RELEASE ORAL at 10:23

## 2022-09-17 RX ADMIN — SODIUM CHLORIDE, POTASSIUM CHLORIDE, SODIUM LACTATE AND CALCIUM CHLORIDE: 600; 310; 30; 20 INJECTION, SOLUTION INTRAVENOUS at 01:34

## 2022-09-17 RX ADMIN — SODIUM CHLORIDE, PRESERVATIVE FREE 10 ML: 5 INJECTION INTRAVENOUS at 23:35

## 2022-09-17 RX ADMIN — ACETAMINOPHEN 1000 MG: 500 TABLET ORAL at 23:32

## 2022-09-17 RX ADMIN — GABAPENTIN 200 MG: 100 CAPSULE ORAL at 13:53

## 2022-09-17 RX ADMIN — IBUPROFEN 800 MG: 800 TABLET, FILM COATED ORAL at 23:32

## 2022-09-17 RX ADMIN — IBUPROFEN 800 MG: 800 TABLET, FILM COATED ORAL at 13:53

## 2022-09-17 RX ADMIN — MAGNESIUM SULFATE HEPTAHYDRATE 2000 MG/HR: 40 INJECTION, SOLUTION INTRAVENOUS at 09:06

## 2022-09-17 RX ADMIN — DOCUSATE SODIUM 100 MG: 100 CAPSULE, LIQUID FILLED ORAL at 18:28

## 2022-09-17 ASSESSMENT — PAIN DESCRIPTION - DESCRIPTORS: DESCRIPTORS: ACHING;DISCOMFORT

## 2022-09-17 ASSESSMENT — PAIN DESCRIPTION - LOCATION: LOCATION: PERINEUM

## 2022-09-17 ASSESSMENT — PAIN SCALES - GENERAL
PAINLEVEL_OUTOF10: 7
PAINLEVEL_OUTOF10: 4

## 2022-09-17 NOTE — PROGRESS NOTES
[address] [place] [address]                                NON STRESS TEST    PATIENT NAME: Gloria Pinedo                     :        2000  MED REC NO:   [id number]                           ROOM:       0003  ACCOUNT NO:   [de-identified]                           ADMIT DATE: 2022  PROVIDER:     JANET Enciso Oris:  2022    INDICATIONS:  The patient is a 27-year-old , presented at 28 and 5  with complaints of spotting. Fetal heart tones were reactive 150, with  moderate variability and accelerations. No decelerations. She had no  active bleeding and was reassured, discharged home, and instructed to  follow up as scheduled in the office.         Bethany Mullins M.D.    D: 2022 11:00:36       T: 2022 4:03:14     BROOKLYN/PAMELA  Job#: [id number]     Doc#: [id number]    CC:

## 2022-09-17 NOTE — ANESTHESIA POSTPROCEDURE EVALUATION
Department of Anesthesiology  Postprocedure Note    Patient: Maryam Petit  MRN: [id number]  YOB: 2000  Date of evaluation: 9/17/2022      Procedure Summary     Date: 09/15/22 Room / Location:     Anesthesia Start: 7907 Anesthesia Stop: 09/16/22 1226    Procedure: Labor Analgesia Diagnosis:     Scheduled Providers:  Responsible Provider: Kenyatta Rosa DO    Anesthesia Type: epidural ASA Status: 3          Anesthesia Type: No value filed.     Prudencio Phase I: Prudencio Score: 9    Prudencio Phase II: Prudencio Score: 10      Anesthesia Post Evaluation    Patient location during evaluation: floor  Patient participation: complete - patient participated  Level of consciousness: awake  Airway patency: patent  Nausea & Vomiting: no vomiting and no nausea  Complications: no  Cardiovascular status: hemodynamically stable  Respiratory status: acceptable  Hydration status: stable

## 2022-09-17 NOTE — FLOWSHEET NOTE
Dr Josh Gtz ordered po blood pressure meds, states okay to stop magnesium and transfer to mom baby after 1230 today.

## 2022-09-17 NOTE — FLOWSHEET NOTE
Pt ready to transfer to mother baby, food tray served, instructed pt to eat and then will transfer pt.

## 2022-09-17 NOTE — FLOWSHEET NOTE
Pt called out to nurses station that she peed the bed and still needed to get up to the bathroom. Pt voided sufficient amount of urine. Sandra care done, pad changed, gown changed, dermaplast, tucks and ice pack applied. Pt tolerated well. Complete linen change done. Pt back to bed at this time.

## 2022-09-17 NOTE — PROGRESS NOTES
Department of Obstetrics and Gynecology  Progress Note      S: doing well. No complaints. Lochia appropriate. Denies cp, sob, ct. Denies headache, vision changes. O:   Vitals:    22 0910   BP: (!) 143/99   Pulse: 87   Resp:    Temp:    SpO2:    UO: about 800 cc with each void    Gen: no acute distress   Resp: breathing unlabored       A: 25 y.o.   PPD#1 s/p , severe PEC, doing well. P:   1. O POS   2. Mag sulfate until 12:30  3. BP elevated 140-150s/90s-will start procardia 30 xl  4. Con't postpartum care   5. Anticipate d/c home tomorrow if BP is well controlled.     Marie Torres MD  9:22 AM  2022

## 2022-09-17 NOTE — PLAN OF CARE
Problem: Vaginal Birth or  Section  Goal: Fetal and maternal status remain reassuring during the birth process  Description:  Birth OB-Pregnancy care plan goal which identifies if the fetal and maternal status remain reassuring during the birth process  Outcome: Progressing  Flowsheets (Taken 2022 1530)  Fetal and Maternal Status Remain Reassuring During the Birth Process: Monitor vital signs     Problem: Pain  Goal: Verbalizes/displays adequate comfort level or baseline comfort level  Outcome: Progressing  Flowsheets (Taken 2022 1344)  Verbalizes/displays adequate comfort level or baseline comfort level: Encourage patient to monitor pain and request assistance  Note: Pain controlled with po meds. Discussed ice for perineal pain or the use of warm blanket/heating pad for uterine cramps. Pt states her pain goal 4/10 has been met.       Problem: Infection - Adult  Goal: Absence of infection at discharge  Outcome: Progressing  Flowsheets (Taken 2022 1344)  Absence of infection at discharge: Assess and monitor for signs and symptoms of infection     Problem: Safety - Adult  Goal: Free from fall injury  Outcome: Progressing  Flowsheets (Taken 2022 1344)  Free From Fall Injury: Instruct family/caregiver on patient safety     Problem: Discharge Planning  Goal: Discharge to home or other facility with appropriate resources  Outcome: Progressing  Flowsheets (Taken 2022 1344)  Discharge to home or other facility with appropriate resources: Identify barriers to discharge with patient and caregiver     Problem: Chronic Conditions and Co-morbidities  Goal: Patient's chronic conditions and co-morbidity symptoms are monitored and maintained or improved  Outcome: Progressing  Flowsheets (Taken 9/15/2022 193 by Zoe Mukherjee RN)  Care Plan - Patient's Chronic Conditions and Co-Morbidity Symptoms are Monitored and Maintained or Improved:   Monitor and assess patient's chronic conditions and comorbid symptoms for stability, deterioration, or improvement   Collaborate with multidisciplinary team to address chronic and comorbid conditions and prevent exacerbation or deterioration   Care plan reviewed with patient. Patient verbalize understanding of the plan of care and contribute to goal setting.

## 2022-09-17 NOTE — FLOWSHEET NOTE
Pt up to the bathroom voiding sufficient amount of urine. Sandra care done. Pads changed. Dermaplast applied. Tucks and new ice applied. Pt back to bed. SCDS applied to bilateral lower legs. Pt tolerated well and has no complaints at this time.

## 2022-09-18 VITALS
WEIGHT: 293 LBS | HEART RATE: 86 BPM | SYSTOLIC BLOOD PRESSURE: 143 MMHG | HEIGHT: 66 IN | RESPIRATION RATE: 18 BRPM | OXYGEN SATURATION: 97 % | DIASTOLIC BLOOD PRESSURE: 88 MMHG | BODY MASS INDEX: 47.09 KG/M2 | TEMPERATURE: 97.7 F

## 2022-09-18 PROCEDURE — 6370000000 HC RX 637 (ALT 250 FOR IP): Performed by: OBSTETRICS & GYNECOLOGY

## 2022-09-18 RX ORDER — NIFEDIPINE 30 MG/1
30 TABLET, EXTENDED RELEASE ORAL DAILY
Qty: 30 TABLET | Refills: 3 | Status: SHIPPED | OUTPATIENT
Start: 2022-09-18 | End: 2022-09-18 | Stop reason: SDUPTHER

## 2022-09-18 RX ORDER — ACETAMINOPHEN 325 MG/1
650 TABLET ORAL EVERY 6 HOURS PRN
Qty: 120 TABLET | Refills: 3 | COMMUNITY
Start: 2022-09-18

## 2022-09-18 RX ORDER — IBUPROFEN 600 MG/1
600 TABLET ORAL EVERY 6 HOURS PRN
Qty: 30 TABLET | Refills: 1 | COMMUNITY
Start: 2022-09-18

## 2022-09-18 RX ORDER — NIFEDIPINE 30 MG/1
30 TABLET, EXTENDED RELEASE ORAL DAILY
Qty: 30 TABLET | Refills: 3 | Status: SHIPPED | OUTPATIENT
Start: 2022-09-18

## 2022-09-18 RX ADMIN — NIFEDIPINE 30 MG: 30 TABLET, FILM COATED, EXTENDED RELEASE ORAL at 08:49

## 2022-09-18 RX ADMIN — ACETAMINOPHEN 1000 MG: 500 TABLET ORAL at 07:18

## 2022-09-18 RX ADMIN — IBUPROFEN 800 MG: 800 TABLET, FILM COATED ORAL at 07:18

## 2022-09-18 RX ADMIN — DOCUSATE SODIUM 100 MG: 100 CAPSULE, LIQUID FILLED ORAL at 08:48

## 2022-09-18 NOTE — PLAN OF CARE
Problem: Pain  Goal: Verbalizes/displays adequate comfort level or baseline comfort level  9/17/2022 2300 by Irving Guzman RN  Outcome: Progressing  Flowsheets (Taken 9/17/2022 2300)  Verbalizes/displays adequate comfort level or baseline comfort level:   Encourage patient to monitor pain and request assistance   Administer analgesics based on type and severity of pain and evaluate response   Assess pain using appropriate pain scale   Implement non-pharmacological measures as appropriate and evaluate response     Problem: Infection - Adult  Goal: Absence of infection at discharge  9/17/2022 2300 by Irving Guzman RN  Outcome: Progressing  Flowsheets (Taken 9/17/2022 2300)  Absence of infection at discharge:   Assess and monitor for signs and symptoms of infection   Instruct and encourage patient and family to use good hand hygiene technique     Problem: Safety - Adult  Goal: Free from fall injury  9/17/2022 2300 by Irving Guzman RN  Outcome: Progressing  Flowsheets (Taken 9/17/2022 2300)  Free From Fall Injury: Instruct family/caregiver on patient safety     Problem: Discharge Planning  Goal: Discharge to home or other facility with appropriate resources  9/17/2022 2300 by Irving Guzman RN  Outcome: Progressing  Flowsheets (Taken 9/17/2022 2300)  Discharge to home or other facility with appropriate resources: Identify barriers to discharge with patient and caregiver     Problem: Chronic Conditions and Co-morbidities  Goal: Patient's chronic conditions and co-morbidity symptoms are monitored and maintained or improved  9/17/2022 2300 by Irving Guzman RN  Outcome: Progressing  Flowsheets (Taken 9/17/2022 2300)  Care Plan - Patient's Chronic Conditions and Co-Morbidity Symptoms are Monitored and Maintained or Improved: Monitor and assess patient's chronic conditions and comorbid symptoms for stability, deterioration, or improvement     Problem: Postpartum  Goal: Experiences normal postpartum course  Description:  Postpartum OB-Pregnancy care plan goal which identifies if the mother is experiencing a normal postpartum course  Outcome: Progressing  Flowsheets (Taken 2022 2300)  Experiences Normal Postpartum Course:   Monitor maternal vital signs   Assess uterine involution     Problem: Postpartum  Goal: Appropriate maternal -  bonding  Description:  Postpartum OB-Pregnancy care plan goal which identifies if the mother and  are bonding appropriately  Outcome: Progressing  Note: Bonding well with infant     Problem: Postpartum  Goal: Establishment of infant feeding pattern  Description:  Postpartum OB-Pregnancy care plan goal which identifies if the mother is establishing a feeding pattern with their   Outcome: Progressing  Flowsheets (Taken 2022 2300)  Establishment of Infant Feeding Pattern: Assess breast/bottle feeding     Problem: Vaginal Birth or  Section  Goal: Fetal and maternal status remain reassuring during the birth process  Description:  Birth OB-Pregnancy care plan goal which identifies if the fetal and maternal status remain reassuring during the birth process  2022 2300 by Jhon Bermudez RN  Outcome: Completed  Flowsheets (Taken 2022 1530 by Mary Quiroz RN)  Fetal and Maternal Status Remain Reassuring During the Birth Process: Monitor vital signs  Note: delivered    Plan of care discussed with mother and she contributes to goal setting and voices understanding of plan of care.

## 2022-09-18 NOTE — PLAN OF CARE
Problem: Pain  Goal: Verbalizes/displays adequate comfort level or baseline comfort level  9/18/2022 1258 by Reyna Barragan RN  Outcome: Adequate for Discharge  Flowsheets (Taken 9/18/2022 0845)  Verbalizes/displays adequate comfort level or baseline comfort level:   Encourage patient to monitor pain and request assistance   Assess pain using appropriate pain scale   Administer analgesics based on type and severity of pain and evaluate response     Problem: Infection - Adult  Goal: Absence of infection at discharge  9/18/2022 1258 by Reyna Barragan RN  Outcome: Adequate for Discharge  Flowsheets (Taken 9/18/2022 0845)  Absence of infection at discharge:   Assess and monitor for signs and symptoms of infection   Monitor lab/diagnostic results     Problem: Safety - Adult  Goal: Free from fall injury  9/18/2022 1258 by Reyna Barragan RN  Outcome: Adequate for Discharge  Flowsheets (Taken 9/18/2022 0845)  Free From Fall Injury: Instruct family/caregiver on patient safety  Note: Pt free from falls this shift.        Problem: Discharge Planning  Goal: Discharge to home or other facility with appropriate resources  9/18/2022 1258 by Reyna Barragan RN  Outcome: Adequate for Discharge  Flowsheets (Taken 9/18/2022 0845)  Discharge to home or other facility with appropriate resources: Identify barriers to discharge with patient and caregiver     Problem: Chronic Conditions and Co-morbidities  Goal: Patient's chronic conditions and co-morbidity symptoms are monitored and maintained or improved  9/18/2022 1258 by Reyna Barragan RN  Outcome: Adequate for Discharge  Flowsheets (Taken 9/17/2022 2300 by Valerie Hodges RN)  Care Plan - Patient's Chronic Conditions and Co-Morbidity Symptoms are Monitored and Maintained or Improved: Monitor and assess patient's chronic conditions and comorbid symptoms for stability, deterioration, or improvement     Problem: Postpartum  Goal: Experiences normal postpartum

## 2022-09-18 NOTE — FLOWSHEET NOTE
Infant has roomed in with mother this shift. Benefits of rooming in discussed. Discharge instructions given. See AVS. Pt verbalized understanding. Postpartum  teaching completed and forms signed by patient. Copy witnessed by RN and given to patient. Patient verbalized understanding of all teaching points. Patient plans to follow-up with [facility] Provider as instructed. Patient verbalizes understanding of discharge instructions and denies further questions. ID bands checked. Patient discharged in stable condition accompanied by family/guardian. Discharged in wheelchair, holding baby in arms.

## 2022-09-18 NOTE — DISCHARGE INSTRUCTIONS
DISCHARGE INSTRUCTIONS FOR  PATIENTS AND FAMILY        Discharge Instructions for Labor and Delivery, Vaginal Birth         A vaginal birth refers to the baby being delivered through the vagina. The amount of time that labor can take varies greatly. Labor for the average first-born baby is about 16 hours. Usually your hospital stay after a routine delivery is no more than two nights. Some new mothers go home the same day. Recovery from childbirth varies depending upon whether you had an episiotomy (an incision in the perineum, the area between your vaginal opening and your anus), the duration of labor and delivery, and the amount of rest you get. In general, it takes about 6-8 weeks for a woman's body to recover from childbirth. What You Will Need   Along with your medications, you will need the following:   Sanitary pads    Nursing pads    Witch hazel pads    Possibly a [address] will want to arrange for transportation home for you and your baby. The baby will need a car seat. You will receive instructions in the hospital for breastfeeding and taking care of the perineum area. You may use ointment for cracked nipples or warm water rinses to your perineum. You will need to wear sanitary pads for about six weeks after delivery. If you had an episiotomy or vaginal tear, you will be sent home with a plastic squirt bottle. Fill it with warm water and squirt over the vaginal and anal area every time you urinate and defecate. Warm baths can be soothing to healing tissues. Apply warm or cold cloths to sore breasts. Apply ointment to cracked nipples. Use nursing pads for leaky breast.    Apply witch hazel pads to sore perineum (area between vagina and anus). Ask your doctor about when it is safe for you to shower or bathe. Sit in a sitz bath to soothe sore perineum and/or hemorrhoids. A sitz bath is soaking the hip and buttocks area in warm water.  You can buy a plastic sitz bath at most drugstores. It will fit on your toilet. You can also use your bathtub. Diet    Eat a well balanced, healthy diet to help your recover from childbirth. If you are breastfeeding, you will need additional calories each day. You may also be advised to avoid certain foods. Some women experience constipation after childbirth. To avoid this problem:   Drink plenty of fluids. Eat foods high in fiber, such as:   Whole grain cereals and breads   Fruits and vegetables   Legumes (eg, beans, lentils)         Physical Activity    Labor and delivery is tiring. Rest when you can to regain your energy. Your doctor will encourage you to exercise by walking. Ask your doctor when you will be able to return to more strenuous exercise. Your doctor may suggest you do Kegel exercises to strengthen your pelvic muscles. To do these tighten your muscles as if you were stopping your urine flow. Hold for a few seconds and then relax. Do these throughout the day. Medications    Breastfeeding can cause your uterus to contract. If painful, your doctor may recommend a pain reliever. If you are breastfeeding, it's important to ask your doctor about taking medications. You may receive from the hospital a list of medications to avoid. Once your doctor has approved your medications, it's important to: Take your medication as directed. Do not change the amount or the schedule. Do not stop taking them without talking to your doctor. Do not share them. Know what the results and side effects may be. Report bothersome side effects to your doctor. Some drugs can be dangerous when mixed. Talk to a doctor or pharmacist if you are taking more than one drug. This includes over-the-counter medication and herb or dietary supplements. Plan ahead for refills so you don't run out.    Ibuprofen over the counter works well for uterine contractions                                   Lifestyle Changes    Having a baby requires significant lifestyle changes. You and your doctor will plan lifestyle changes that will help you recover. Some things to keep in mind include: It is important to get enough rest so you can recover. Try sleeping when the baby sleeps. Ask your doctor when you can resume sexual relations. If you have not done so already, talk to your doctor about birth control options. If you are breastfeeding, consider a breast pump. Call your obstetrician and/or pediatrician for any questions that arise. Understand the changes your body is going through as it recovers from childbirth:   Hot and cold flashes as your body adjusts to new hormone and blood flow levels   Urinary or fecal incontinence due to stretched muscles   After pains from uterine contractions as the uterus shrinks   Vaginal bleeding (called lochia), which is heavier than your period (generally stops within two months)   Baby blues, feelings of irritability, sadness, crying, or anxiety. Postpartum depression is more severe, occurring in 10%-20% of new moms. If you experience such symptoms, contact your doctor. Consider joining a support group for new mothers. You can get encouragement and learn parenting strategies. Follow-up   Schedule a follow-up appointment as directed by your doctor. Call Your Doctor If Any of the Following Occurs   It is important for you to monitor your recovery once you leave the hospital. That way, you can alert your doctor to any problems immediately.  If any of the following occurs, call your doctor:   Signs of infection, including fever and chills    Increased bleeding: soaking more than one sanitary pad an hour    Wounds that become red, swollen or drain pus    Vaginal discharge that smells foul    New pain, swelling, or tenderness in your legs    Pain that you can't control with the medications you've been given    Pain, burning, urgency or frequency of urination, or persistent bleeding in the urine    Cough, shortness of breath, or chest pain    Depression, suicidal thoughts, or feelings of harming your baby    Breasts that are hot, red and accompanied by fever    Any cracking or bleeding from the nipple or areola (the dark-colored area of the breast)    Headache no resolved with tylenol, vision changes, uncontrolled nausea or vomiting. In case of an emergency, call 911 immediately.

## 2022-09-18 NOTE — DISCHARGE SUMMARY
Obstetric Discharge Summary      Pt Name: Dexter Smith  MRN: [id number] [place] #: [de-identified]  YOB: 2000        Admitting Diagnosis  25 y.o.  @ 36+6 wks IUP presented with preeclampsia with severe features. Reasons for Admission on 9/15/2022  1:36 PM  Vaginal Delivery    Hospital course: 25 y.o.  @ 36+6 wks IUP presented with preeclampsia with severe features. She underwent IOL. She progressed well and delivered via . Her post partum course was uncomplicated. She remained on mag sulfate for 24 hrs after delivery. PPD#1 she was started on procardia 30 xl daily for severe range pressure on occasion. She was discharged home on PPD#2 in good condition.      Postpartum/Operative Complications  none    Discharge Diagnosis  female infant  Preeclampsia with severe features      Discharge Information  Current Discharge Medication List        START taking these medications    Details   NIFEdipine (PROCARDIA XL) 30 MG extended release tablet Take 1 tablet by mouth daily  Qty: 30 tablet, Refills: 3      ibuprofen (ADVIL;MOTRIN) 600 MG tablet Take 1 tablet by mouth every 6 hours as needed for Pain  Qty: 30 tablet, Refills: 1      acetaminophen (AMINOFEN) 325 MG tablet Take 2 tablets by mouth every 6 hours as needed for Pain  Qty: 120 tablet, Refills: 3           CONTINUE these medications which have NOT CHANGED    Details   Prenatal Vit-Fe Fumarate-FA (PRENATAL 1+1 PO) Take by mouth           STOP taking these medications       Prenatal MV-Min-Fe Fum-FA-DHA (PRENATAL 1 PO) Comments:   Reason for Stopping:                 Diet: regular  Activity: nothing in the vagina for 6 weeks-no sex, no tampons, no douching, no heavy lifting, limited activity for 2-3 weeks  Discharge to:  home  Follow up in 1 wk for BP check    Kenia Smith MD  8:26 AM  2022

## 2022-09-18 NOTE — PROGRESS NOTES
Department of Obstetrics and Gynecology  Progress Note      S: doing well. No complaints. Lochia appropriate. Denies cp, sob, ct. Denies headache, vision changes. Ready to go home. O:   Vitals:    22 0415   BP: 135/70   Pulse: 90   Resp: 18   Temp: 98.4 °F (36.9 °C)   SpO2: 96%   -130/50-70    Gen: no acute distress   Resp: breathing unlabored       A: 25 y.o.   PPD#1 s/p ,PEC w/ severe features, s/p mag, doing well. P:   1. O POS   2. Continue procardia 20 xl daily. BP are well controlled.   2. Con't postpartum care   3. d/c home today-f/u in 1 wk for BP check    Santana Guillory MD  8:23 AM  2022

## 2022-09-19 ENCOUNTER — HOSPITAL ENCOUNTER (EMERGENCY)
Age: 22
Discharge: HOME | End: 2022-09-19
Payer: COMMERCIAL

## 2022-09-19 VITALS
DIASTOLIC BLOOD PRESSURE: 78 MMHG | WEIGHT: 293 LBS | HEIGHT: 66 IN | HEART RATE: 81 BPM | TEMPERATURE: 98.9 F | RESPIRATION RATE: 19 BRPM | SYSTOLIC BLOOD PRESSURE: 133 MMHG | OXYGEN SATURATION: 97 % | BODY MASS INDEX: 47.09 KG/M2

## 2022-09-19 DIAGNOSIS — I10 UNCONTROLLED HYPERTENSION: ICD-10-CM

## 2022-09-19 DIAGNOSIS — R51.9 ACUTE NONINTRACTABLE HEADACHE, UNSPECIFIED HEADACHE TYPE: Primary | ICD-10-CM

## 2022-09-19 LAB
ALBUMIN SERPL-MCNC: 3.2 G/DL (ref 3.5–5.1)
ALP BLD-CCNC: 66 U/L (ref 38–126)
ALT SERPL-CCNC: 26 U/L (ref 11–66)
ANION GAP SERPL CALCULATED.3IONS-SCNC: 10 MEQ/L (ref 8–16)
AST SERPL-CCNC: 40 U/L (ref 5–40)
BACTERIA: ABNORMAL
BASOPHILS # BLD: 0.2 %
BASOPHILS ABSOLUTE: 0 THOU/MM3 (ref 0–0.1)
BILIRUB SERPL-MCNC: 0.2 MG/DL (ref 0.3–1.2)
BILIRUBIN URINE: NEGATIVE
BLOOD, URINE: ABNORMAL
BUN BLDV-MCNC: 9 MG/DL (ref 7–22)
CALCIUM SERPL-MCNC: 9.5 MG/DL (ref 8.5–10.5)
CASTS: ABNORMAL /LPF
CASTS: ABNORMAL /LPF
CHARACTER, URINE: CLEAR
CHLORIDE BLD-SCNC: 105 MEQ/L (ref 98–111)
CO2: 26 MEQ/L (ref 23–33)
COLOR: YELLOW
CREAT SERPL-MCNC: 0.7 MG/DL (ref 0.4–1.2)
CRYSTALS: ABNORMAL
EOSINOPHIL # BLD: 1.6 %
EOSINOPHILS ABSOLUTE: 0.2 THOU/MM3 (ref 0–0.4)
EPITHELIAL CELLS, UA: ABNORMAL /HPF
ERYTHROCYTE [DISTWIDTH] IN BLOOD BY AUTOMATED COUNT: 13.5 % (ref 11.5–14.5)
ERYTHROCYTE [DISTWIDTH] IN BLOOD BY AUTOMATED COUNT: 45.4 FL (ref 35–45)
GFR SERPL CREATININE-BSD FRML MDRD: > 90 ML/MIN/1.73M2
GLUCOSE BLD-MCNC: 80 MG/DL (ref 70–108)
GLUCOSE, URINE: NEGATIVE MG/DL
HCT VFR BLD CALC: 36.8 % (ref 37–47)
HEMOGLOBIN: 11.7 GM/DL (ref 12–16)
IMMATURE GRANS (ABS): 0.12 THOU/MM3 (ref 0–0.07)
IMMATURE GRANULOCYTES: 1 %
KETONES, URINE: NEGATIVE
LEUKOCYTE ESTERASE, URINE: ABNORMAL
LYMPHOCYTES # BLD: 27.6 %
LYMPHOCYTES ABSOLUTE: 3.3 THOU/MM3 (ref 1–4.8)
MCH RBC QN AUTO: 29.6 PG (ref 26–33)
MCHC RBC AUTO-ENTMCNC: 31.8 GM/DL (ref 32.2–35.5)
MCV RBC AUTO: 93.2 FL (ref 81–99)
MISCELLANEOUS LAB TEST RESULT: ABNORMAL
MONOCYTES # BLD: 6.4 %
MONOCYTES ABSOLUTE: 0.8 THOU/MM3 (ref 0.4–1.3)
NITRITE, URINE: NEGATIVE
NUCLEATED RED BLOOD CELLS: 0 /100 WBC
OSMOLALITY CALCULATION: 278.9 MOSMOL/KG (ref 275–300)
PH UA: 7.5 (ref 5–9)
PLATELET # BLD: 334 THOU/MM3 (ref 130–400)
PMV BLD AUTO: 9.7 FL (ref 9.4–12.4)
POTASSIUM SERPL-SCNC: 4 MEQ/L (ref 3.5–5.2)
PROTEIN UA: NEGATIVE MG/DL
RBC # BLD: 3.95 MILL/MM3 (ref 4.2–5.4)
RBC URINE: ABNORMAL /HPF
RENAL EPITHELIAL, UA: ABNORMAL
SEG NEUTROPHILS: 63.2 %
SEGMENTED NEUTROPHILS ABSOLUTE COUNT: 7.6 THOU/MM3 (ref 1.8–7.7)
SODIUM BLD-SCNC: 141 MEQ/L (ref 135–145)
SPECIFIC GRAVITY UA: 1.01 (ref 1–1.03)
TOTAL PROTEIN: 6.2 G/DL (ref 6.1–8)
UROBILINOGEN, URINE: 0.2 EU/DL (ref 0–1)
WBC # BLD: 12.1 THOU/MM3 (ref 4.8–10.8)
WBC UA: ABNORMAL /HPF
YEAST: ABNORMAL

## 2022-09-19 PROCEDURE — 81001 URINALYSIS AUTO W/SCOPE: CPT

## 2022-09-19 PROCEDURE — 80053 COMPREHEN METABOLIC PANEL: CPT

## 2022-09-19 PROCEDURE — 99283 EMERGENCY DEPT VISIT LOW MDM: CPT

## 2022-09-19 PROCEDURE — 6370000000 HC RX 637 (ALT 250 FOR IP): Performed by: NURSE PRACTITIONER

## 2022-09-19 PROCEDURE — 85025 COMPLETE CBC W/AUTO DIFF WBC: CPT

## 2022-09-19 RX ORDER — BUTALBITAL, ACETAMINOPHEN AND CAFFEINE 50; 325; 40 MG/1; MG/1; MG/1
1 TABLET ORAL ONCE
Status: COMPLETED | OUTPATIENT
Start: 2022-09-19 | End: 2022-09-19

## 2022-09-19 RX ORDER — NIFEDIPINE 30 MG/1
30 TABLET, EXTENDED RELEASE ORAL ONCE
Status: COMPLETED | OUTPATIENT
Start: 2022-09-19 | End: 2022-09-19

## 2022-09-19 RX ORDER — ACETAMINOPHEN 325 MG/1
650 TABLET ORAL ONCE
Status: COMPLETED | OUTPATIENT
Start: 2022-09-19 | End: 2022-09-19

## 2022-09-19 RX ADMIN — BUTALBITAL, ACETAMINOPHEN, AND CAFFEINE 1 TABLET: 50; 325; 40 TABLET ORAL at 17:05

## 2022-09-19 RX ADMIN — ACETAMINOPHEN 650 MG: 325 TABLET ORAL at 14:33

## 2022-09-19 RX ADMIN — NIFEDIPINE 30 MG: 30 TABLET, FILM COATED, EXTENDED RELEASE ORAL at 17:05

## 2022-09-19 ASSESSMENT — ENCOUNTER SYMPTOMS
COLOR CHANGE: 0
RHINORRHEA: 0
SHORTNESS OF BREATH: 0
ABDOMINAL PAIN: 0
CHEST TIGHTNESS: 0
SORE THROAT: 0
NAUSEA: 0
ABDOMINAL DISTENTION: 0
COUGH: 0
VOMITING: 0
SINUS PRESSURE: 0

## 2022-09-19 ASSESSMENT — PAIN SCALES - GENERAL
PAINLEVEL_OUTOF10: 6
PAINLEVEL_OUTOF10: 5

## 2022-09-19 NOTE — ED TRIAGE NOTES
Pt presents to the ED via triage with c/o headache. Pt states \"this is my second terrible headache today. Pt states she was just discharged from the hospital yesterday after being induced and having a vaginal delivery due to high blood pressure. Pt states she is prescribed procardia due her her high BP, last time she took the medication was yesterday. Pt states headache is in the back of her head.  RR easy and unlabored

## 2022-09-19 NOTE — ED NOTES
Pt and vs reassessed. RR easy and unlabored. Pt resting in bed alert. Pt states pain is 5 out of 10 and medicated per MAR. Manual BP complete and documented at this time.  Pt denies any needs and is stable at this time     Neil Patel, [address] [place] [address]  09/19/22 2887

## 2022-09-19 NOTE — ED PROVIDER NOTES
[facility] Emergency Department    CHIEF COMPLAINT       Chief Complaint   Patient presents with    Headache       Nurses Notes reviewed and I agree except as noted in the HPI. HISTORY OF PRESENT ILLNESS    Dexter Smith is a 25 y.o. female who presents to the ED for evaluation of tach. Patient notes headache to the top and back of her head, intermittent, denies any aggravating factors. Notes symptoms began today. Denies any change in vision, denies nausea or vomiting. Notes she recently gave birth and was released from the hospital yesterday. Notes she had hypertension during pregnancy. Denies history of preeclampsia. She was prescribed Procardia at discharge, but has not filled the medication yet. She has not taken any medication for headache yet today. She notes she has been pregnant twice and she has 2 children. She notes she had an epidural, she notes that they had difficulty obtaining this. She notes some low back pain. She notes some pain going down her left leg. She denies headache worsening with standing or sitting up. HPI was provided by the patient. REVIEW OF SYSTEMS     Review of Systems   Constitutional:  Negative for activity change, chills, fatigue and fever. HENT:  Negative for congestion, rhinorrhea, sinus pressure and sore throat. Respiratory:  Negative for cough, chest tightness and shortness of breath. Cardiovascular:  Negative for chest pain. Gastrointestinal:  Negative for abdominal distention, abdominal pain, nausea and vomiting. Genitourinary:  Positive for vaginal bleeding. Negative for decreased urine volume and difficulty urinating. Musculoskeletal:  Negative for arthralgias, neck pain and neck stiffness. Skin:  Negative for color change and wound. Allergic/Immunologic: Negative for immunocompromised state. Neurological:  Positive for headaches. Negative for weakness, light-headedness and numbness.    Hematological:  Does not bruise/bleed Determinants of Health     Financial Resource Strain: Not on file   Food Insecurity: Not on file   Transportation Needs: Not on file   Physical Activity: Not on file   Stress: Not on file   Social Connections: Not on file   Intimate Partner Violence: Not on file   Housing Stability: Not on file       PHYSICAL EXAM     INITIAL VITALS:  height is 5' 6\" (1.676 m) and weight is 380 lb (172.4 kg) (abnormal). Her oral temperature is 98.9 °F (37.2 °C). Her blood pressure is 157/94 (abnormal) and her pulse is 97. Her respiration is 18 and oxygen saturation is 97%. Physical Exam  Vitals and nursing note reviewed. Constitutional:       Appearance: Normal appearance. She is well-developed. HENT:      Head: Normocephalic. Nose: Nose normal.      Mouth/Throat:      Mouth: Mucous membranes are moist.      Pharynx: Uvula midline. Eyes:      Extraocular Movements: Extraocular movements intact. Conjunctiva/sclera: Conjunctivae normal.      Pupils: Pupils are equal, round, and reactive to light. Cardiovascular:      Rate and Rhythm: Normal rate and regular rhythm. Heart sounds: Normal heart sounds, S1 normal and S2 normal.   Pulmonary:      Effort: Pulmonary effort is normal. No respiratory distress. Breath sounds: Normal breath sounds. Chest:      Chest wall: No tenderness. Abdominal:      General: Bowel sounds are normal. There is no distension. Palpations: Abdomen is soft. Tenderness: There is no abdominal tenderness. Musculoskeletal:         General: Normal range of motion. Cervical back: Normal range of motion and neck supple. Lymphadenopathy:      Cervical: No cervical adenopathy. Skin:     General: Skin is warm and dry. Capillary Refill: Capillary refill takes less than 2 seconds. Neurological:      General: No focal deficit present. Mental Status: She is alert and oriented to person, place, and time. Mental status is at baseline.       GCS: GCS eye subscore is 4. GCS verbal subscore is 5. GCS motor subscore is 6. Cranial Nerves: No cranial nerve deficit. Sensory: No sensory deficit. Motor: No weakness, tremor, atrophy or abnormal muscle tone. Coordination: Coordination is intact. Romberg sign negative. Coordination normal. Finger-Nose-Finger Test normal.      Gait: Gait normal.   Psychiatric:         Speech: Speech normal.         Behavior: Behavior normal.         Thought Content: Thought content normal.         Judgment: Judgment normal.       DIFFERENTIAL DIAGNOSIS:   Preeclampsia, postpartum headache, spinal headache, hypertension  DIAGNOSTIC RESULTS         RADIOLOGY: non-plainfilm images(s) such as CT, Ultrasound and MRI are read by the radiologist.  Plain radiographic images are visualized and preliminarily interpreted by the emergency physician unless otherwise stated below. No orders to display         LABS:   Labs Reviewed   URINALYSIS WITH MICROSCOPIC   CBC WITH AUTO DIFFERENTIAL   COMPREHENSIVE METABOLIC PANEL       EMERGENCY DEPARTMENT COURSE:   Vitals:    Vitals:    09/19/22 1407   BP: (!) 157/94   Pulse: 97   Resp: 18   Temp: 98.9 °F (37.2 °C)   TempSrc: Oral   SpO2: 97%   Weight: (!) 380 lb (172.4 kg)   Height: 5' 6\" (1.676 m)       MDM  Patient was seen and evaluated in the emergency department, patient appeared to be in no acute distress, vital signs reviewed, hypertension noted. Physical exam was completed, no neurologic deficits are noted. No clonus noted. Pupils are equal reactive to light, no extraocular movements are noted, Romberg is negative. Stable to ambulate with no difficulty. She is treated with Tylenol. Discussed the patient with her OB/GYN Dr. Yossi Rodriguez, lab work is ordered, repeat blood pressure testing is ordered. Blood pressure remained elevated while here in the ER, I discussed with Dr. Yossi Rodriguez, she would like us to give Procardia XL 30 mg, and Fioricet.   My shift ended before patient reassessment could recur, please see the note of Cristiana Pereira PA-C for repeat evaluation. Medications   acetaminophen (TYLENOL) tablet 650 mg (has no administration in time range)       Patient was seenindependently by myself. The patient's final impression and disposition and plan was determined by myself. CRITICAL CARE:   None    CONSULTS:  None    PROCEDURES:  None    FINAL IMPRESSION   No diagnosis found. DISPOSITION/PLAN   Please see the note of Cristiana Pereira PA-C    PATIENT REFERREDTO:  No follow-up provider specified. DISCHARGE MEDICATIONS:  New Prescriptions    No medications on file       (Please note that portions of this note were completed with a voice recognition program.  Efforts were made to edit the dictations but occasionally words are mis-transcribed.)        Provider:  I personally performed the services described in the documentation,reviewed and edited the documentation which was dictated to the scribe in my presence, and it accurately records my words and actions.     Richard Mejia CNP 09/19/22 2:31 PM    Luh Mejia, APRN - CNP         [employer], [place] - CNP  09/21/22 9821

## 2022-09-19 NOTE — ED NOTES
Pt and vs reassessed. RR easy and unlabored. Pt resting in chair at bedside alert after ambulating to the bathroom to provide a urine sample.  Pt denies any needs and is stable at this time     Jagjit RenteriaJefferson Health Northeast  09/19/22 1867

## 2022-09-19 NOTE — ED PROVIDER NOTES
Transfer of Care Note:   I have personally performed a face to face diagnostic evaluation on this patient. I have personally performed a face to face diagnostic evaluation on this patient. The patient's initial evaluation and plan have been discussed with the prior provider who initially evaluated the patient. Nursing Notes, Past Medical Hx, Past Surgical Hx, Social Hx, Allergies, and Family Hx were all reviewed. (Please note that portions of this note were completed with a voice recognition program.  Efforts were made to edit the dictations but occasionally words are mis-transcribed.)    6:10 PM EDT: The patient was evaluated. Tereza Nava is a 25 y.o. female who presents to the Emergency Department for the evaluation of headache in the setting of postpartum condition. Patient was induced and delivered on 9-16 secondary to gestational hypertension. Patient was discharged home with Procardia however did not get it filled as of yet. Patient came to the ER secondary to headache. Patient was seen by Theo Mejia NP who consulted Dr. Benjamin Farr. Plan was to reassess patient's pain and blood pressure after Procardia. Analysis was pending as well. Exam:  Vital signs reviewed. Constitutional: Well-nourished, no distress evident. HEENT: Normocephalic, normal hearing, EOMI, speech clear. Neck: Soft supple. Trachea midline. Heart: Regular rate and rhythm. Lungs: Respiratory effort normal; CTAB  Abdomen: Nondistended; soft, nontender. Extremities: Well-perfused, movement normal as observed. Neuro: No focal deficits noted. Psych: Normal affect and behavior. EKG:  All EKG's are interpreted by the Emergency Department Physician who either signs or Co-signs this chart in the absence of a cardiologist.  None    RADIOLOGY: non-plain film images(s) such as CT, Ultrasound and MRI are read by the radiologist.  No orders to display       ED LABS:  Labs Reviewed   URINALYSIS WITH MICROSCOPIC - Abnormal; Notable for the following components:       Result Value    Blood, Urine MODERATE (*)     Leukocyte Esterase, Urine TRACE (*)     All other components within normal limits   CBC WITH AUTO DIFFERENTIAL - Abnormal; Notable for the following components:    WBC 12.1 (*)     RBC 3.95 (*)     Hemoglobin 11.7 (*)     Hematocrit 36.8 (*)     MCHC 31.8 (*)     RDW-SD 45.4 (*)     Immature Grans (Abs) 0.12 (*)     All other components within normal limits   COMPREHENSIVE METABOLIC PANEL - Abnormal; Notable for the following components:    Albumin 3.2 (*)     Total Bilirubin 0.2 (*)     All other components within normal limits   ANION GAP   OSMOLALITY   GLOMERULAR FILTRATION RATE, ESTIMATED       MDM:  Patient was turned over to me by Anne Mejia NP pending urinalysis, reassessment, and OB consultation. Patient's blood pressure normalized to 124/71. Patient's headache was resolved. Patient remained neurologically intact and wanted to go home. Urinalysis was negative for proteinuria. Dr. Serena Hoyos was consulted and advised patient could go home to follow-up with Dr. Karli Bowman in the office and the patient should take her Procardia daily as directed. The patient was comfortable with plan of care. Anticipatory guidance given. I have given the patient strict written and verbal instructions about care at home, follow-up, and signs and symptoms of worsening of condition and they did verbalize understanding. FINAL IMPRESSION      1. Acute nonintractable headache, unspecified headache type    2. Uncontrolled hypertension    3. Benign essential hypertension causing postpartum complication, delivered during previous episode of care        Care of this patient was transferred from Anne Mejia NP to myself at shift change.     (Please note that portions of this note were completed with a voice recognition program.  Efforts were made to edit the dictations but occasionally words are mis-transcribed.)    Asher Davila BENOIT 9/19/22 6:10 PM        Jess Xavier PA-C  09/19/22 3611

## 2022-09-28 ENCOUNTER — HOSPITAL ENCOUNTER (OUTPATIENT)
Dept: INTERVENTIONAL RADIOLOGY/VASCULAR | Age: 22
Discharge: HOME | End: 2022-09-28
Payer: COMMERCIAL

## 2022-09-28 DIAGNOSIS — R20.2 PARESTHESIA: ICD-10-CM

## 2022-09-28 DIAGNOSIS — M25.562 LEFT KNEE PAIN, UNSPECIFIED CHRONICITY: ICD-10-CM

## 2022-09-28 PROCEDURE — 93971 EXTREMITY STUDY: CPT

## 2022-10-08 NOTE — PROCEDURES
Department of Obstetrics and Gynecology  Labor and Delivery   Triage Note      Pt Name: Armani Reyez  MRN: [id number] [place] #: [de-identified]  YOB: 2000  Procedure Performed By: Thang Fortune DO        SUBJECTIVE: Patient presented at 36 week complaining of elevated BP. OBJECTIVE    Vitals:  /63   Pulse 83   Temp 97.3 °F (36.3 °C) (Temporal)   Resp 16   Ht 5' 6\" (1.676 m)   Wt (!) 396 lb (179.6 kg)   LMP 01/01/2022   SpO2 98%   BMI 63.92 kg/m²     Fetal heart rate:         Baseline Heart Rate:  130        Accelerations:  present       Decelerations:  absent       Variability:  moderate    Contraction frequency: none    The NST was reactive. It was a Category 1 tracing. ASSESSMENT & PLAN:    Discharged to home in a stable condition and instructed to follow up at her next scheduled appointment.     Thang Fortune DO

## 2022-10-14 NOTE — ANESTHESIA PROCEDURE NOTES
Epidural Block    Patient location during procedure: OB  Reason for block: labor epidural  Staffing  Performed: anesthesiologist   Anesthesiologist: Sagar Lira DO  Epidural  Patient position: sitting  Prep: ChloraPrep  Patient monitoring: continuous pulse ox and frequent blood pressure checks  Approach: midline  Location: L2-3  Injection technique: EARL saline  Provider prep: mask and sterile gloves  Needle  Needle type: Tuohy   Needle gauge: 18 G  Needle insertion depth: 10.5 cm  Catheter type: side hole  Catheter at skin depth: 20 cm  Test dose: negativeCatheter Secured: tegaderm and tape  Assessment  Hemodynamics: stable  Attempts: 1  Outcomes: patient tolerated procedure well  Preanesthetic Checklist  Completed: patient identified, IV checked, site marked, risks and benefits discussed, surgical/procedural consents, equipment checked, pre-op evaluation, timeout performed, anesthesia consent given, oxygen available and monitors applied/VS acknowledged yes

## 2022-10-24 ENCOUNTER — HOSPITAL ENCOUNTER (EMERGENCY)
Age: 22
Discharge: HOME | End: 2022-10-24
Attending: STUDENT IN AN ORGANIZED HEALTH CARE EDUCATION/TRAINING PROGRAM
Payer: COMMERCIAL

## 2022-10-24 VITALS
SYSTOLIC BLOOD PRESSURE: 132 MMHG | OXYGEN SATURATION: 100 % | WEIGHT: 293 LBS | HEART RATE: 71 BPM | HEIGHT: 66 IN | RESPIRATION RATE: 16 BRPM | TEMPERATURE: 98.6 F | DIASTOLIC BLOOD PRESSURE: 80 MMHG | BODY MASS INDEX: 47.09 KG/M2

## 2022-10-24 DIAGNOSIS — R51.9 NONINTRACTABLE HEADACHE, UNSPECIFIED CHRONICITY PATTERN, UNSPECIFIED HEADACHE TYPE: Primary | ICD-10-CM

## 2022-10-24 LAB
ALBUMIN SERPL-MCNC: 3.8 G/DL (ref 3.5–5.1)
ALP BLD-CCNC: 60 U/L (ref 38–126)
ALT SERPL-CCNC: 13 U/L (ref 11–66)
ANION GAP SERPL CALCULATED.3IONS-SCNC: 8 MEQ/L (ref 8–16)
AST SERPL-CCNC: 11 U/L (ref 5–40)
BACTERIA: NORMAL
BASOPHILS # BLD: 0.2 %
BASOPHILS ABSOLUTE: 0 THOU/MM3 (ref 0–0.1)
BILIRUB SERPL-MCNC: 0.2 MG/DL (ref 0.3–1.2)
BILIRUBIN DIRECT: < 0.2 MG/DL (ref 0–0.3)
BILIRUBIN URINE: NEGATIVE
BLOOD, URINE: NEGATIVE
BUN BLDV-MCNC: 12 MG/DL (ref 7–22)
CALCIUM SERPL-MCNC: 9.3 MG/DL (ref 8.5–10.5)
CASTS: NORMAL /LPF
CASTS: NORMAL /LPF
CHARACTER, URINE: CLEAR
CHLORIDE BLD-SCNC: 105 MEQ/L (ref 98–111)
CO2: 29 MEQ/L (ref 23–33)
COLOR: YELLOW
CREAT SERPL-MCNC: 0.9 MG/DL (ref 0.4–1.2)
CREATININE URINE: 105.1 MG/DL
CRYSTALS: NORMAL
EOSINOPHIL # BLD: 0.9 %
EOSINOPHILS ABSOLUTE: 0.1 THOU/MM3 (ref 0–0.4)
EPITHELIAL CELLS, UA: NORMAL /HPF
ERYTHROCYTE [DISTWIDTH] IN BLOOD BY AUTOMATED COUNT: 12.2 % (ref 11.5–14.5)
ERYTHROCYTE [DISTWIDTH] IN BLOOD BY AUTOMATED COUNT: 41 FL (ref 35–45)
GFR SERPL CREATININE-BSD FRML MDRD: > 60 ML/MIN/1.73M2
GLUCOSE BLD-MCNC: 94 MG/DL (ref 70–108)
GLUCOSE, URINE: NEGATIVE MG/DL
HCT VFR BLD CALC: 38.3 % (ref 37–47)
HEMOGLOBIN: 12 GM/DL (ref 12–16)
IMMATURE GRANS (ABS): 0.03 THOU/MM3 (ref 0–0.07)
IMMATURE GRANULOCYTES: 0.3 %
KETONES, URINE: NEGATIVE
LEUKOCYTE ESTERASE, URINE: NEGATIVE
LYMPHOCYTES # BLD: 36.7 %
LYMPHOCYTES ABSOLUTE: 3.4 THOU/MM3 (ref 1–4.8)
MCH RBC QN AUTO: 28.8 PG (ref 26–33)
MCHC RBC AUTO-ENTMCNC: 31.3 GM/DL (ref 32.2–35.5)
MCV RBC AUTO: 92.1 FL (ref 81–99)
MISCELLANEOUS LAB TEST RESULT: NORMAL
MONOCYTES # BLD: 6.5 %
MONOCYTES ABSOLUTE: 0.6 THOU/MM3 (ref 0.4–1.3)
NITRITE, URINE: NEGATIVE
NUCLEATED RED BLOOD CELLS: 0 /100 WBC
OSMOLALITY CALCULATION: 282.6 MOSMOL/KG (ref 275–300)
PH UA: 7 (ref 5–9)
PLATELET # BLD: 348 THOU/MM3 (ref 130–400)
PMV BLD AUTO: 9.5 FL (ref 9.4–12.4)
POTASSIUM REFLEX MAGNESIUM: 3.8 MEQ/L (ref 3.5–5.2)
PROT/CREAT RATIO, UR: 0.09
PROTEIN UA: NEGATIVE MG/DL
PROTEIN, URINE: 9.1 MG/DL
RBC # BLD: 4.16 MILL/MM3 (ref 4.2–5.4)
RBC URINE: NORMAL /HPF
RENAL EPITHELIAL, UA: NORMAL
SEG NEUTROPHILS: 55.4 %
SEGMENTED NEUTROPHILS ABSOLUTE COUNT: 5.2 THOU/MM3 (ref 1.8–7.7)
SODIUM BLD-SCNC: 142 MEQ/L (ref 135–145)
SPECIFIC GRAVITY UA: 1.01 (ref 1–1.03)
TOTAL PROTEIN: 6.2 G/DL (ref 6.1–8)
UROBILINOGEN, URINE: 1 EU/DL (ref 0–1)
WBC # BLD: 9.3 THOU/MM3 (ref 4.8–10.8)
WBC UA: NORMAL /HPF
YEAST: NORMAL

## 2022-10-24 PROCEDURE — 80076 HEPATIC FUNCTION PANEL: CPT

## 2022-10-24 PROCEDURE — 99284 EMERGENCY DEPT VISIT MOD MDM: CPT | Performed by: STUDENT IN AN ORGANIZED HEALTH CARE EDUCATION/TRAINING PROGRAM

## 2022-10-24 PROCEDURE — 81001 URINALYSIS AUTO W/SCOPE: CPT

## 2022-10-24 PROCEDURE — 6360000002 HC RX W HCPCS: Performed by: STUDENT IN AN ORGANIZED HEALTH CARE EDUCATION/TRAINING PROGRAM

## 2022-10-24 PROCEDURE — 85025 COMPLETE CBC W/AUTO DIFF WBC: CPT

## 2022-10-24 PROCEDURE — 84156 ASSAY OF PROTEIN URINE: CPT

## 2022-10-24 PROCEDURE — 82570 ASSAY OF URINE CREATININE: CPT

## 2022-10-24 PROCEDURE — 80048 BASIC METABOLIC PNL TOTAL CA: CPT

## 2022-10-24 PROCEDURE — 96374 THER/PROPH/DIAG INJ IV PUSH: CPT

## 2022-10-24 PROCEDURE — 36415 COLL VENOUS BLD VENIPUNCTURE: CPT

## 2022-10-24 RX ORDER — METOCLOPRAMIDE HYDROCHLORIDE 5 MG/ML
10 INJECTION INTRAMUSCULAR; INTRAVENOUS ONCE
Status: COMPLETED | OUTPATIENT
Start: 2022-10-24 | End: 2022-10-24

## 2022-10-24 RX ORDER — ONDANSETRON 2 MG/ML
4 INJECTION INTRAMUSCULAR; INTRAVENOUS ONCE
Status: DISCONTINUED | OUTPATIENT
Start: 2022-10-24 | End: 2022-10-24

## 2022-10-24 RX ORDER — MAGNESIUM SULFATE IN WATER 40 MG/ML
4000 INJECTION, SOLUTION INTRAVENOUS ONCE
Status: DISCONTINUED | OUTPATIENT
Start: 2022-10-24 | End: 2022-10-24

## 2022-10-24 RX ADMIN — METOCLOPRAMIDE HYDROCHLORIDE 10 MG: 5 INJECTION INTRAMUSCULAR; INTRAVENOUS at 01:53

## 2022-10-24 ASSESSMENT — PAIN - FUNCTIONAL ASSESSMENT: PAIN_FUNCTIONAL_ASSESSMENT: 0-10

## 2022-10-24 ASSESSMENT — PAIN DESCRIPTION - LOCATION: LOCATION: HEAD

## 2022-10-24 ASSESSMENT — PAIN SCALES - GENERAL: PAINLEVEL_OUTOF10: 8

## 2022-10-24 ASSESSMENT — PAIN DESCRIPTION - DESCRIPTORS: DESCRIPTORS: ACHING

## 2022-10-24 NOTE — ED NOTES
Pt presents to the ED c/o a migraine that started approx 1 hour ago. Pt states that she woke up and took her migraine medication then came to the ED right away.  Pt is alert and oriented, respirations are equal and unlabored     Benny Yang RN  10/24/22 [id number]

## 2022-10-24 NOTE — ED PROVIDER NOTES
[facility] [address] [address] EMERGENCY DEPT  EMERGENCY DEPARTMENT     Pt Name: Katelyn Ritter  MRN: [id number]  [place] 2000  Date of evaluation: 10/24/2022  Provider: Candice Deng MD,    [address] [place] [address]       Chief Complaint   Patient presents with    Migraine       HISTORY OF PRESENT ILLNESS    Katelyn Ritter is a 25 y.o. female who presents to the emergency department from home with a chief complaint of headache and intermittent blurred vision ongoing for the past hour associated with nausea and one episode of vomiting. She is  at [address] [place] [address] post induction for preeclampsia with severe features. The patient reports she has not been taking her Procardia diligently and missed her dose today. She denies chest pain or shortness of breath. She denies lower extremity edema, erythema, pain. She denies abdominal pain. She denies neck pain or fever. The headache is bilateral, throbbing, similar nature to the headaches that she has been having in the setting of her diagnosis of preeclampsia. Triage notes and Nursing notes were reviewed by myself. Any discrepancies are addressed above. PAST MEDICAL HISTORY     Past Medical History:   Diagnosis Date    Polycystic ovary disease        SURGICAL HISTORY       Past Surgical History:   Procedure Laterality Date    CYST REMOVAL Right     wrist    TONSILLECTOMY AND ADENOIDECTOMY         CURRENT MEDICATIONS       Previous Medications    ACETAMINOPHEN (AMINOFEN) 325 MG TABLET    Take 2 tablets by mouth every 6 hours as needed for Pain    IBUPROFEN (ADVIL;MOTRIN) 600 MG TABLET    Take 1 tablet by mouth every 6 hours as needed for Pain    NIFEDIPINE (PROCARDIA XL) 30 MG EXTENDED RELEASE TABLET    Take 1 tablet by mouth daily    PRENATAL VIT-FE FUMARATE-FA (PRENATAL 1+1 PO)    Take by mouth       ALLERGIES     Patient has no known allergies.     FAMILY HISTORY       Family History   Problem Relation Age of Onset    Diabetes Sister     Diabetes Paternal Aunt     Thyroid Disease Maternal Grandmother     Diabetes Paternal Grandmother     Cancer Paternal Grandmother         SOCIAL HISTORY       Social History     Socioeconomic History    Marital status: Single     Spouse name: None    Number of children: None    Years of education: None    Highest education level: None   Tobacco Use    Smoking status: Never    Smokeless tobacco: Never   Vaping Use    Vaping Use: Never used   Substance and Sexual Activity    Alcohol use: No    Drug use: Yes     Types: Marijuana [place] [employer])     Comment: last use- July 2022       REVIEW OF SYSTEMS     Review of Systems  - CONSTITUTIONAL: Denies weight loss, fever and chills. - HEENT: Reports intermittent blurred vision as well as mild photophobia    -   RESPIRATORY: Denies SOB and cough. - CV: Denies palpitations and CP.       - GI: Denies abdominal pain. Reports nausea with 1 episode of vomiting    - : Denies dysuria and urinary frequency. - MSK: Denies myalgia and joint pain. - SKIN: Denies rash and pruritus.    -   NEUROLOGICAL: Reports headache. Denies focal neurological deficits.    - PSYCHIATRIC: Denies recent changes in mood. Denies anxiety and depression. Except as noted above the remainder of the review of systems was reviewed and is.    PHYSICAL EXAM    (up to 7 for level 4, 8 or more for level 5)     ED Triage Vitals [10/24/22 0051]   BP Temp Temp src Heart Rate Resp SpO2 Height Weight   (!) 138/90 98.6 °F (37 °C) -- 81 16 98 % 5' 6\" (1.676 m) (!) 360 lb (163.3 kg)       Physical Exam  Constitutional:  Well developed, well nourished, no acute distress, non-toxic appearance   Eyes:  PERRL, conjunctiva normal, mild photophobia  HENT:  Atraumatic, external ears normal, nose normal.  Neck- normal range of motion, no tenderness, supple   Respiratory:  No respiratory distress, normal breath sounds,   Cardiovascular:  Normal rate, normal rhythm, no murmurs, no gallops, no rubs   GI:  Soft, nondistended, nontender,   Musculoskeletal:  No edema, no tenderness, no deformities. Integument:  Well hydrated, no rash   Lymphatic:  No lymphadenopathy noted   Neurologic:  Alert & oriented x 3, CN 2-12 normal, normal motor function, normal sensory function, no focal deficits noted   Psychiatric:  Speech and behavior appropriate  DIAGNOSTIC RESULTS     EKG:(none if blank)  All EKG's are [facility] who either signs or Co-signs this chart in the absence of a cardiologist.        RADIOLOGY: (none if blank)   Interpretation per the Radiologistbelow, if available at the time of this note:    No orders to display       LABS:  Labs Reviewed   [facility] W/ REFLEX TO MG FOR LOW K   HEPATIC FUNCTION PANEL   URINALYSIS WITH MICROSCOPIC       All other labs were within normal range or not returned as of this dictation. Please note, any cultures that may have been sent were not resulted at the time of this patient visit. EMERGENCY DEPARTMENT COURSE andMedical Decision Making:     MDM  /  ED Course as of 10/24/22 0345   Mon Oct 24, 2022   0116 Normal neurological examination. Still within the 6 weeks hence concern for preeclampsia remains. Laboratory eval to include CBC/BMP/hepatic function panel/urinalysis ordered. I reached out to the patient's OB group physician on-call to inform them of the patient's presence in the emergency department. Dr. Jonny Burns recommended avoidance of magnesium until further laboratory eval is elicited and laboratory eval results and recommends general headache management pending further evaluation. Reglan ordered. Will reevaluate [AM]   4881 BP 0145am: 128/74 [AM]   7026 Blood pressure improving as pain is improving. Urinalysis pending. Rest of laboratory eval thus far is reassuring. [AM]   0303 Headache completely resolved. UA negative for proteinuria. Pending urine creatinine ratio.   Will discharge home. [AM]   0335 Prot/Creat Ratio, Ur: 0.09 [AM] ED Course User Index  [AM] MD Dr. Miesha Felix updated. Patient will follow-up in the office. Strict returnprecautions and follow up instructions were discussed with the patient with which the patient agrees    ED Medications administered this visit:    Medications   magnesium sulfate 4000 mg in 100 mL IVPB premix (has no administration in time range)         Procedures: (None if blank)       CLINICAL       1. Nonintractable headache, unspecified chronicity pattern, unspecified headache type          DISPOSITION/PLAN   DISPOSITION    Discharge home    PATIENT REFERRED TO:  [address] [place] [id number]  Call in 3 days      Nataly Suero MD  [place] [address]  [phone]    In 3 days      (Please note that portions of this note were completed with a voice recognition program.  Efforts were made to edit the dictations but occasionallywords are mis-transcribed. )      Ricardo Donnelly MD, (electronically signed)  Attending Physician, Emergency Department         Ricardo Donnelly MD  10/24/22 2474

## 2022-11-08 ENCOUNTER — HOSPITAL ENCOUNTER (OUTPATIENT)
Dept: PHYSICAL THERAPY | Age: 22
Setting detail: THERAPIES SERIES
Discharge: HOME | End: 2022-11-08
Payer: COMMERCIAL

## 2022-11-08 PROCEDURE — 97530 THERAPEUTIC ACTIVITIES: CPT

## 2022-11-08 PROCEDURE — 97167 OT EVAL HIGH COMPLEX 60 MIN: CPT
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
## 2022-11-16 ENCOUNTER — HOSPITAL ENCOUNTER (OUTPATIENT)
Dept: PHYSICAL THERAPY | Age: 22
Setting detail: THERAPIES SERIES
Discharge: HOME | End: 2022-11-16
Payer: COMMERCIAL

## 2022-11-16 PROCEDURE — 97530 THERAPEUTIC ACTIVITIES: CPT
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
## 2022-11-21 ENCOUNTER — APPOINTMENT (OUTPATIENT)
Dept: PHYSICAL THERAPY | Age: 22
End: 2022-11-21
Payer: COMMERCIAL

## 2022-12-12 ENCOUNTER — HOSPITAL ENCOUNTER (OUTPATIENT)
Dept: PHYSICAL THERAPY | Age: 22
Setting detail: THERAPIES SERIES
End: 2022-12-12
Payer: COMMERCIAL

## 2022-12-13 ENCOUNTER — HOSPITAL ENCOUNTER (OUTPATIENT)
Dept: PHYSICAL THERAPY | Age: 22
Setting detail: THERAPIES SERIES
Discharge: HOME | End: 2022-12-13
Payer: COMMERCIAL

## 2022-12-13 PROCEDURE — 97530 THERAPEUTIC ACTIVITIES: CPT
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
## 2022-12-21 ENCOUNTER — APPOINTMENT (OUTPATIENT)
Dept: PHYSICAL THERAPY | Age: 22
End: 2022-12-21
Payer: COMMERCIAL

## 2023-01-09 NOTE — DISCHARGE SUMMARY
[address] [address]  OUTPATIENT OCCUPATIONAL THERAPY QUICK DISCHARGE NOTE  Specialized Therapy Services    Date: 2023  Patient Name: Katelyn Ritter        CSN: [id number]   : 2000  (25 y.o.)  Gender: female   Sis Setter, APRN - *,    Pelvic and perineal pain [R10.2],      Patient is discharged from Occupational Therapy services at this time. See last note for details related to results of therapy and goal achievement. Reason for discharge: This Therapist received email from clerical staff reporting : patient would like to be DC . Electronically Signed by:  PRIMO Macias/JOSHUA   License: [id number]  Occupational Therapist  [address]

## 2023-01-28 ENCOUNTER — HOSPITAL ENCOUNTER (INPATIENT)
Age: 23
LOS: 3 days | Discharge: HOME | DRG: 418 | End: 2023-01-31
Attending: EMERGENCY MEDICINE | Admitting: SURGERY
Payer: COMMERCIAL

## 2023-01-28 ENCOUNTER — APPOINTMENT (OUTPATIENT)
Dept: ULTRASOUND IMAGING | Age: 23
DRG: 418 | End: 2023-01-28
Payer: COMMERCIAL

## 2023-01-28 DIAGNOSIS — K81.0 CHOLECYSTITIS, ACUTE: ICD-10-CM

## 2023-01-28 DIAGNOSIS — K80.50 BILIARY COLIC: ICD-10-CM

## 2023-01-28 DIAGNOSIS — K81.0 ACUTE CHOLECYSTITIS: Primary | ICD-10-CM

## 2023-01-28 DIAGNOSIS — R11.2 NAUSEA AND VOMITING, UNSPECIFIED VOMITING TYPE: ICD-10-CM

## 2023-01-28 DIAGNOSIS — K80.20 CALCULUS OF GALLBLADDER WITHOUT CHOLECYSTITIS WITHOUT OBSTRUCTION: ICD-10-CM

## 2023-01-28 LAB
ALBUMIN SERPL BCG-MCNC: 3.9 G/DL (ref 3.5–5.1)
ALP SERPL-CCNC: 50 U/L (ref 38–126)
ALT SERPL W/O P-5'-P-CCNC: 29 U/L (ref 11–66)
ANION GAP SERPL CALC-SCNC: 11 MEQ/L (ref 8–16)
AST SERPL-CCNC: 34 U/L (ref 5–40)
B-HCG SERPL QL: NEGATIVE
BASOPHILS ABSOLUTE: 0 THOU/MM3 (ref 0–0.1)
BASOPHILS NFR BLD AUTO: 0.1 %
BILIRUB CONJ SERPL-MCNC: < 0.2 MG/DL (ref 0–0.3)
BILIRUB SERPL-MCNC: 0.3 MG/DL (ref 0.3–1.2)
BILIRUB UR QL STRIP.AUTO: NEGATIVE
BUN SERPL-MCNC: 11 MG/DL (ref 7–22)
CALCIUM SERPL-MCNC: 9.5 MG/DL (ref 8.5–10.5)
CHARACTER UR: CLEAR
CHLORIDE SERPL-SCNC: 105 MEQ/L (ref 98–111)
CO2 SERPL-SCNC: 24 MEQ/L (ref 23–33)
COLOR: YELLOW
CREAT SERPL-MCNC: 0.7 MG/DL (ref 0.4–1.2)
DEPRECATED RDW RBC AUTO: 41.1 FL (ref 35–45)
EKG ATRIAL RATE: 86 BPM
EKG P AXIS: 70 DEGREES
EKG P-R INTERVAL: 146 MS
EKG Q-T INTERVAL: 366 MS
EKG QRS DURATION: 84 MS
EKG QTC CALCULATION (BAZETT): 437 MS
EKG R AXIS: 31 DEGREES
EKG VENTRICULAR RATE: 86 BPM
EOSINOPHIL NFR BLD AUTO: 0.7 %
EOSINOPHILS ABSOLUTE: 0.1 THOU/MM3 (ref 0–0.4)
ERYTHROCYTE [DISTWIDTH] IN BLOOD BY AUTOMATED COUNT: 12.6 % (ref 11.5–14.5)
GFR SERPL CREATININE-BSD FRML MDRD: > 60 ML/MIN/1.73M2
GLUCOSE SERPL-MCNC: 98 MG/DL (ref 70–108)
GLUCOSE UR QL STRIP.AUTO: NEGATIVE MG/DL
HCT VFR BLD AUTO: 41.9 % (ref 37–47)
HGB BLD-MCNC: 13.1 GM/DL (ref 12–16)
HGB UR QL STRIP.AUTO: NEGATIVE
IMM GRANULOCYTES # BLD AUTO: 0.01 THOU/MM3 (ref 0–0.07)
IMM GRANULOCYTES NFR BLD AUTO: 0.1 %
KETONES UR QL STRIP.AUTO: NEGATIVE
LIPASE SERPL-CCNC: 17.1 U/L (ref 5.6–51.3)
LYMPHOCYTES ABSOLUTE: 2.9 THOU/MM3 (ref 1–4.8)
LYMPHOCYTES NFR BLD AUTO: 34.5 %
MAGNESIUM SERPL-MCNC: 1.9 MG/DL (ref 1.6–2.4)
MCH RBC QN AUTO: 27.9 PG (ref 26–33)
MCHC RBC AUTO-ENTMCNC: 31.3 GM/DL (ref 32.2–35.5)
MCV RBC AUTO: 89.3 FL (ref 81–99)
MONOCYTES ABSOLUTE: 0.7 THOU/MM3 (ref 0.4–1.3)
MONOCYTES NFR BLD AUTO: 8.8 %
NEUTROPHILS NFR BLD AUTO: 55.8 %
NITRITE UR QL STRIP: NEGATIVE
NRBC BLD AUTO-RTO: 0 /100 WBC
OSMOLALITY SERPL CALC.SUM OF ELEC: 278.8 MOSMOL/KG (ref 275–300)
PH UR STRIP.AUTO: 7 [PH] (ref 5–9)
PLATELET # BLD AUTO: 320 THOU/MM3 (ref 130–400)
PMV BLD AUTO: 9.8 FL (ref 9.4–12.4)
POTASSIUM SERPL-SCNC: 3.6 MEQ/L (ref 3.5–5.2)
PROT SERPL-MCNC: 7.1 G/DL (ref 6.1–8)
PROT UR STRIP.AUTO-MCNC: NEGATIVE MG/DL
RBC # BLD AUTO: 4.69 MILL/MM3 (ref 4.2–5.4)
SEGMENTED NEUTROPHILS ABSOLUTE COUNT: 4.7 THOU/MM3 (ref 1.8–7.7)
SODIUM SERPL-SCNC: 140 MEQ/L (ref 135–145)
SP GR UR REFRACT.AUTO: 1.02 (ref 1–1.03)
UROBILINOGEN, URINE: 1 EU/DL (ref 0–1)
WBC # BLD AUTO: 8.5 THOU/MM3 (ref 4.8–10.8)
WBC #/AREA URNS HPF: NEGATIVE /[HPF]

## 2023-01-28 PROCEDURE — 83690 ASSAY OF LIPASE: CPT

## 2023-01-28 PROCEDURE — 96376 TX/PRO/DX INJ SAME DRUG ADON: CPT

## 2023-01-28 PROCEDURE — 6360000002 HC RX W HCPCS: Performed by: SURGERY

## 2023-01-28 PROCEDURE — 6360000002 HC RX W HCPCS: Performed by: EMERGENCY MEDICINE

## 2023-01-28 PROCEDURE — 2580000003 HC RX 258: Performed by: EMERGENCY MEDICINE

## 2023-01-28 PROCEDURE — 99285 EMERGENCY DEPT VISIT HI MDM: CPT

## 2023-01-28 PROCEDURE — 81003 URINALYSIS AUTO W/O SCOPE: CPT

## 2023-01-28 PROCEDURE — 83735 ASSAY OF MAGNESIUM: CPT

## 2023-01-28 PROCEDURE — 93010 ELECTROCARDIOGRAM REPORT: CPT | Performed by: INTERNAL MEDICINE

## 2023-01-28 PROCEDURE — 2580000003 HC RX 258: Performed by: SURGERY

## 2023-01-28 PROCEDURE — 96374 THER/PROPH/DIAG INJ IV PUSH: CPT

## 2023-01-28 PROCEDURE — 36415 COLL VENOUS BLD VENIPUNCTURE: CPT

## 2023-01-28 PROCEDURE — 80053 COMPREHEN METABOLIC PANEL: CPT

## 2023-01-28 PROCEDURE — 85025 COMPLETE CBC W/AUTO DIFF WBC: CPT

## 2023-01-28 PROCEDURE — 82248 BILIRUBIN DIRECT: CPT

## 2023-01-28 PROCEDURE — 93005 ELECTROCARDIOGRAM TRACING: CPT | Performed by: EMERGENCY MEDICINE

## 2023-01-28 PROCEDURE — 1200000000 HC SEMI PRIVATE

## 2023-01-28 PROCEDURE — 84703 CHORIONIC GONADOTROPIN ASSAY: CPT

## 2023-01-28 PROCEDURE — C9113 INJ PANTOPRAZOLE SODIUM, VIA: HCPCS | Performed by: EMERGENCY MEDICINE

## 2023-01-28 PROCEDURE — 76705 ECHO EXAM OF ABDOMEN: CPT

## 2023-01-28 RX ORDER — CIPROFLOXACIN 2 MG/ML
400 INJECTION, SOLUTION INTRAVENOUS EVERY 12 HOURS
Status: DISCONTINUED | OUTPATIENT
Start: 2023-01-28 | End: 2023-01-30

## 2023-01-28 RX ORDER — ONDANSETRON 2 MG/ML
4 INJECTION INTRAMUSCULAR; INTRAVENOUS ONCE
Status: COMPLETED | OUTPATIENT
Start: 2023-01-28 | End: 2023-01-28

## 2023-01-28 RX ORDER — PANTOPRAZOLE SODIUM 40 MG/10ML
40 INJECTION, POWDER, LYOPHILIZED, FOR SOLUTION INTRAVENOUS ONCE
Status: COMPLETED | OUTPATIENT
Start: 2023-01-28 | End: 2023-01-28

## 2023-01-28 RX ORDER — ONDANSETRON 2 MG/ML
4 INJECTION INTRAMUSCULAR; INTRAVENOUS EVERY 6 HOURS PRN
Status: DISCONTINUED | OUTPATIENT
Start: 2023-01-28 | End: 2023-01-31 | Stop reason: HOSPADM

## 2023-01-28 RX ORDER — SODIUM CHLORIDE 0.9 % (FLUSH) 0.9 %
5-40 SYRINGE (ML) INJECTION PRN
Status: DISCONTINUED | OUTPATIENT
Start: 2023-01-28 | End: 2023-01-31 | Stop reason: HOSPADM

## 2023-01-28 RX ORDER — SODIUM CHLORIDE 9 MG/ML
INJECTION, SOLUTION INTRAVENOUS CONTINUOUS
Status: DISCONTINUED | OUTPATIENT
Start: 2023-01-28 | End: 2023-01-31 | Stop reason: HOSPADM

## 2023-01-28 RX ORDER — SODIUM CHLORIDE 9 MG/ML
INJECTION, SOLUTION INTRAVENOUS PRN
Status: DISCONTINUED | OUTPATIENT
Start: 2023-01-28 | End: 2023-01-31 | Stop reason: HOSPADM

## 2023-01-28 RX ORDER — ONDANSETRON 4 MG/1
4 TABLET, ORALLY DISINTEGRATING ORAL EVERY 8 HOURS PRN
Status: DISCONTINUED | OUTPATIENT
Start: 2023-01-28 | End: 2023-01-31 | Stop reason: HOSPADM

## 2023-01-28 RX ORDER — SODIUM CHLORIDE 0.9 % (FLUSH) 0.9 %
5-40 SYRINGE (ML) INJECTION EVERY 12 HOURS SCHEDULED
Status: DISCONTINUED | OUTPATIENT
Start: 2023-01-28 | End: 2023-01-31 | Stop reason: HOSPADM

## 2023-01-28 RX ADMIN — ONDANSETRON 4 MG: 2 INJECTION INTRAMUSCULAR; INTRAVENOUS at 17:38

## 2023-01-28 RX ADMIN — CEFOXITIN SODIUM 1000 MG: 1 POWDER, FOR SOLUTION INTRAVENOUS at 21:20

## 2023-01-28 RX ADMIN — CIPROFLOXACIN 400 MG: 2 INJECTION, SOLUTION INTRAVENOUS at 22:46

## 2023-01-28 RX ADMIN — PANTOPRAZOLE SODIUM 40 MG: 40 INJECTION, POWDER, FOR SOLUTION INTRAVENOUS at 14:49

## 2023-01-28 RX ADMIN — ONDANSETRON 4 MG: 2 INJECTION INTRAMUSCULAR; INTRAVENOUS at 14:49

## 2023-01-28 RX ADMIN — SODIUM CHLORIDE: 9 INJECTION, SOLUTION INTRAVENOUS at 21:00

## 2023-01-28 RX ADMIN — SODIUM CHLORIDE, PRESERVATIVE FREE 10 ML: 5 INJECTION INTRAVENOUS at 21:01

## 2023-01-28 ASSESSMENT — PAIN DESCRIPTION - LOCATION: LOCATION: ABDOMEN

## 2023-01-28 ASSESSMENT — PAIN SCALES - GENERAL
PAINLEVEL_OUTOF10: 5
PAINLEVEL_OUTOF10: 10

## 2023-01-28 ASSESSMENT — PAIN DESCRIPTION - ORIENTATION: ORIENTATION: RIGHT;UPPER;MID

## 2023-01-28 ASSESSMENT — PAIN - FUNCTIONAL ASSESSMENT: PAIN_FUNCTIONAL_ASSESSMENT: 0-10

## 2023-01-28 NOTE — ED PROVIDER NOTES
[address] [address] EMERGENCY DEPT      EMERGENCY MEDICINE     Room # 40/040A    Pt Name: Minerva El  MRN: [id number]  [place] 2000  Date of evaluation: 1/28/2023  Provider: Ki Laguerre MD    CHIEF COMPLAINT       Chief Complaint   Patient presents with    Abdominal Pain     HISTORY OF PRESENT ILLNESS   Minerva El is a pleasant 21 y.o. female who presents to the emergency department from from home, as a walk in to the ED lobby for evaluation of   Epigastric and right upper quadrant abdominal pain since midnight last night. The patient states that she ate at [facility] last night and by midnight having epigastric and right upper quadrant pain and vomited 3 times and again 1 time today. Had bad diarrhea 1 time. Pain has been on and off , no fever ,no chills, no shortness of breath or chest pain. At lunchtime, the patient ate cereal and her debbie recurred on her epigastric and right upper quadrant and vomited once more. Patient admitted that she had history of acid reflux when she was pregnant and delivered in December 2022. The patient had been taking Tums at the time    PASTMEDICAL HISTORY     Past Medical History:   Diagnosis Date    Polycystic ovary disease        Patient Active Problem List   Diagnosis Code    Obesity E66.9    Pre-diabetes R73.03    Bleeding R58    Vaginal spotting N93.9    Fall at home, initial encounter [address] [address]. Chad Galaviz, Y92.009    Pregnancy complication, antepartum O26.90    Pregnancy complication before birth O99.891    Vaginal delivery O80    Acute cholecystitis K81.0     SURGICAL HISTORY       Past Surgical History:   Procedure Laterality Date    CYST REMOVAL Right     wrist    TONSILLECTOMY AND ADENOIDECTOMY         CURRENT MEDICATIONS       Previous Medications    ACETAMINOPHEN (AMINOFEN) 325 MG TABLET    Take 2 tablets by mouth every 6 hours as needed for Pain    IBUPROFEN (ADVIL;MOTRIN) 600 MG TABLET    Take 1 tablet by mouth every 6 hours as needed for Pain    NIFEDIPINE (PROCARDIA XL) 30 MG EXTENDED RELEASE TABLET    Take 1 tablet by mouth daily    PRENATAL VIT-FE FUMARATE-FA (PRENATAL 1+1 PO)    Take by mouth       ALLERGIES     has No Known Allergies. FAMILY HISTORY     She indicated that her mother is alive. She indicated that her father is alive. She indicated that the status of her sister is unknown. She indicated that the status of her maternal grandmother is unknown. She indicated that the status of her paternal grandmother is unknown. She indicated that the status of her paternal aunt is unknown. SOCIAL HISTORY       Social History     Tobacco Use    Smoking status: Never    Smokeless tobacco: Never   Vaping Use    Vaping Use: Never used   Substance Use Topics    Alcohol use: No    Drug use: Yes     Types: Marijuana Shawn Paredes)     Comment: last use- July 2022       PHYSICAL EXAM       ED Triage Vitals [01/28/23 1325]   BP Temp Temp Source Heart Rate Resp SpO2 Height Weight   (!) 132/91 97.6 °F (36.4 °C) Oral 80 20 97 % 5' 6\" (1.676 m) (!) 350 lb (158.8 kg)       Additional Vital Signs:  BP (!) 141/83   Pulse 70   Temp 97.6 °F (36.4 °C) (Oral)   Resp 18   Ht 5' 6\" (1.676 m)   Wt (!) 350 lb (158.8 kg)   SpO2 99%   BMI 56.49 kg/m² Estimated body mass index is 56.49 kg/m² as calculated from the following:    Height as of this encounter: 5' 6\" (1.676 m). Weight as of this encounter: 350 lb (158.8 kg). Physical Exam  Vitals reviewed. Constitutional:       Appearance: She is well-developed. HENT:      Head: Normocephalic and atraumatic. Right Ear: External ear normal.      Left Ear: External ear normal.      Nose: Nose normal.   Eyes:      General: No scleral icterus. Conjunctiva/sclera: Conjunctivae normal.      Pupils: Pupils are equal, round, and reactive to light. Neck:      Thyroid: No thyromegaly. Vascular: No JVD. Cardiovascular:      Rate and Rhythm: Normal rate and regular rhythm. Heart sounds: No murmur heard.     No friction rub.   Pulmonary:      Effort: Pulmonary effort is normal.      Breath sounds: Normal breath sounds. No wheezing or rales. Chest:      Chest wall: No tenderness. Abdominal:      General: Bowel sounds are normal.      Palpations: Abdomen is soft. There is no mass. Tenderness: There is abdominal tenderness in the right upper quadrant and epigastric area. Musculoskeletal:      Cervical back: Normal range of motion and neck supple. Lymphadenopathy:      Cervical: No cervical adenopathy. Skin:     Findings: No rash. Neurological:      Mental Status: She is alert and oriented to person, place, and time. Psychiatric:         Behavior: Behavior is cooperative. FORMAL DIAGNOSTIC RESULTS     RADIOLOGY: Interpretation per the Radiologist below, if available at the time of this note (none if blank):    US GALLBLADDER RUQ   Final Result   Cholelithiasis. Findings suspicious for acute cholecystitis. **This report has been created using voice recognition software. It may contain minor errors which are inherent in voice recognition technology. **      Final report electronically signed by Dr. Isaac Martinez on 1/28/2023 4:12 PM          LABS: (none if blank)  Labs Reviewed   CBC WITH AUTO DIFFERENTIAL - Abnormal; Notable for the following components:       Result Value    MCHC 31.3 (*)     All other components within normal limits   BASIC METABOLIC PANEL   MAGNESIUM   HCG, SERUM, QUALITATIVE   LIPASE   ANION GAP   OSMOLALITY   GLOMERULAR FILTRATION RATE, ESTIMATED   URINALYSIS WITH REFLEX TO CULTURE   HEPATIC FUNCTION PANEL   CBC WITH AUTO DIFFERENTIAL       (Any cultures that may have been sent were not resulted at the time of this patient visit)    [address] / ED COURSE:     1) Number and Complexity of Problems            Problem List This Visit:         Chief Complaint   Patient presents with    Abdominal Pain            Differential Diagnosis includes (but not limited to):  Biliary colic, cholelithiasis cholecystitis, GERD gastritis viral syndrome        Diagnoses Considered but I have low suspicion of:   Coronary syndrome             Pertinent Comorbid Conditions: Morbid obesity    2)  Data Reviewed (none if left blank)          My Independent interpretations:       Imaging: Showed cholelithiasis with possible acute cholecystitis    Labs: Within normal limit                 Decision Rules/Clinical Scores utilized:  None            External Documentation Reviewed:         Previous patient encounter documents & history available on EMR was reviewed none             See Formal Diagnostic Results above for the lab and radiology tests and orders.     3)  Treatment and Disposition:         ED Medications administered this visit:  (None if blank)         Medications   HYDROmorphone (DILAUDID) injection 0.5 mg (has no administration in time range)   cefOXitin (MEFOXIN) 1,000 mg in sodium chloride 0.9 % 50 mL IVPB (mini-bag) (has no administration in time range)   pantoprazole (PROTONIX) injection 40 mg (40 mg IntraVENous Given 1/28/23 1449)   ondansetron (ZOFRAN) injection 4 mg (4 mg IntraVENous Given 1/28/23 1449)   ondansetron (ZOFRAN) injection 4 mg (4 mg IntraVENous Given 1/28/23 1738)            ED Reassessment: Patient remained having nausea and having abdominal pain that is referred to the general surgery Dr. Katlyn Alvarez         Case discussed with consulting clinician: General surgery Dr. Katlyn Alvarez who admitted the patient graciously         Shared Decision-Making was performed and disposition discussed with the        Patient/Family and questions answered          Social determinants of health impacting treatment or disposition:  NA         Code Status:  Full Code      Summary of Patient Presentation:      MDM  Number of Diagnoses or Management Options  Biliary colic: new, needed workup  Calculus of gallbladder without cholecystitis without obstruction: new, needed workup  Nausea and vomiting, unspecified vomiting type: new, needed workup     Amount and/or Complexity of Data Reviewed  Clinical lab tests: ordered and reviewed  Tests in the radiology section of CPT®: ordered and reviewed  Tests in the medicine section of CPT®: ordered and reviewed  Discussion of test results with the performing providers: no  Decide to obtain previous medical records or to obtain history from someone other than the patient: no  Obtain history from someone other than the patient: yes  Review and summarize past medical records: no  Discuss the patient with other providers: yes  Independent visualization of images, tracings, or specimens: no    Risk of Complications, Morbidity, and/or Mortality  Presenting problems: moderate  Diagnostic procedures: moderate  Management options: moderate    Patient Progress  Patient progress: stable  /   Vitals Reviewed:    Vitals:    01/28/23 1450 01/28/23 1549 01/28/23 1644 01/28/23 1817   BP: (!) 141/79 137/83 131/84 (!) 141/83   Pulse: 76 79 67 70   Resp: 18 18 18 18   Temp:       TempSrc:       SpO2: 99% 99% 99% 99%   Weight:       Height:           The patient was seen and examined. Appropriate diagnostic testing was performed and results reviewed with the patient patient remained having abdominal pain and feeling nauseous prompting to medicate the patient with Dilaudid and Zofran in addition to Protonix given earlier. The results of pertinent diagnostic studies and exam findings were discussed. The patients provisional diagnosis and plan of care were discussed with the patient and present family who expressed understanding. Patient's case is referred to the general surgery services Dr. Cornelio Tierney who graciously admitted the patient      PROCEDURES: (None if blank)  Procedures:     CRITICAL CARE:  None      FINAL IMPRESSION      1. Calculus of gallbladder without cholecystitis without obstruction    2. Biliary colic    3.  Nausea and vomiting, unspecified vomiting type          DISPOSITION/PLAN   DISPOSITION Admitted 01/28/2023 06:53:42 PM      PATIENT REFERRED TO: Admitted to general surgery Dr. Mayo Vizcaino        (Please note that portions of this note were completed with a voice recognition program and electronically transcribed. Efforts were made to edit the dictations but occasionally words are mis-transcribed . The transcription may contain errors not detected in proofreading.   This transcription was electronically signed.)     01/28/23 7:02 PM      Sandy Porras MD        Emergency room physician             Sandy Porras MD  01/28/23 MD Luciano  01/28/23 9999

## 2023-01-28 NOTE — ED NOTES
Pt denies any pain or nausea. Pt perspirations even and unlabored.       Suraj Flores RN  01/28/23 6858

## 2023-01-28 NOTE — ED NOTES
Pt resting on cot with eyes closed. Pt respirations are even and unlabored. Pt family at bedside.      Mouna Mckinney RN  01/28/23 5296

## 2023-01-28 NOTE — ED NOTES
Pt ambulated to the restroom to obtain urine sample. RN updated patient mother on POC.       Abhinav Kaur RN  01/28/23 6948

## 2023-01-28 NOTE — LETTER
[address]  [facility] [address]  Phone: [phone]             January 31, 2023    Patient: Kevin Ocampo   YOB: 2000   Date of Visit: 1/28/2023       To Whom It May Concern:    Kandyce Opitz was seen and treated in our facility  beginning 1/28/2023 until 1/31/23. She may return to work on after seen in office for two week follow up and cleared by dr. Lyly King.       Sincerely,       Dang Lou RN         Signature:__________________________________     /

## 2023-01-29 LAB
BASOPHILS ABSOLUTE: 0 THOU/MM3 (ref 0–0.1)
BASOPHILS NFR BLD AUTO: 0.4 %
DEPRECATED RDW RBC AUTO: 41.6 FL (ref 35–45)
EOSINOPHIL NFR BLD AUTO: 2.6 %
EOSINOPHILS ABSOLUTE: 0.2 THOU/MM3 (ref 0–0.4)
ERYTHROCYTE [DISTWIDTH] IN BLOOD BY AUTOMATED COUNT: 12.6 % (ref 11.5–14.5)
HCT VFR BLD AUTO: 39.6 % (ref 37–47)
HGB BLD-MCNC: 12.4 GM/DL (ref 12–16)
IMM GRANULOCYTES # BLD AUTO: 0.01 THOU/MM3 (ref 0–0.07)
IMM GRANULOCYTES NFR BLD AUTO: 0.1 %
LYMPHOCYTES ABSOLUTE: 2 THOU/MM3 (ref 1–4.8)
LYMPHOCYTES NFR BLD AUTO: 26.1 %
MCH RBC QN AUTO: 28.2 PG (ref 26–33)
MCHC RBC AUTO-ENTMCNC: 31.3 GM/DL (ref 32.2–35.5)
MCV RBC AUTO: 90 FL (ref 81–99)
MONOCYTES ABSOLUTE: 0.6 THOU/MM3 (ref 0.4–1.3)
MONOCYTES NFR BLD AUTO: 8.1 %
NEUTROPHILS NFR BLD AUTO: 62.7 %
NRBC BLD AUTO-RTO: 0 /100 WBC
PLATELET # BLD AUTO: 300 THOU/MM3 (ref 130–400)
PMV BLD AUTO: 10.1 FL (ref 9.4–12.4)
RBC # BLD AUTO: 4.4 MILL/MM3 (ref 4.2–5.4)
SEGMENTED NEUTROPHILS ABSOLUTE COUNT: 4.8 THOU/MM3 (ref 1.8–7.7)
WBC # BLD AUTO: 7.6 THOU/MM3 (ref 4.8–10.8)

## 2023-01-29 PROCEDURE — 1200000000 HC SEMI PRIVATE

## 2023-01-29 PROCEDURE — 6360000002 HC RX W HCPCS: Performed by: SURGERY

## 2023-01-29 PROCEDURE — C9113 INJ PANTOPRAZOLE SODIUM, VIA: HCPCS | Performed by: SURGERY

## 2023-01-29 PROCEDURE — 36415 COLL VENOUS BLD VENIPUNCTURE: CPT

## 2023-01-29 PROCEDURE — 85025 COMPLETE CBC W/AUTO DIFF WBC: CPT

## 2023-01-29 PROCEDURE — 0FT44ZZ RESECTION OF GALLBLADDER, PERCUTANEOUS ENDOSCOPIC APPROACH: ICD-10-PCS | Performed by: SURGERY

## 2023-01-29 PROCEDURE — 2580000003 HC RX 258: Performed by: SURGERY

## 2023-01-29 PROCEDURE — 6370000000 HC RX 637 (ALT 250 FOR IP): Performed by: SURGERY

## 2023-01-29 RX ORDER — ACETAMINOPHEN 325 MG/1
650 TABLET ORAL EVERY 6 HOURS PRN
Status: DISCONTINUED | OUTPATIENT
Start: 2023-01-29 | End: 2023-01-31 | Stop reason: HOSPADM

## 2023-01-29 RX ADMIN — SODIUM CHLORIDE: 9 INJECTION, SOLUTION INTRAVENOUS at 14:45

## 2023-01-29 RX ADMIN — CIPROFLOXACIN 400 MG: 2 INJECTION, SOLUTION INTRAVENOUS at 20:39

## 2023-01-29 RX ADMIN — ACETAMINOPHEN 650 MG: 325 TABLET ORAL at 17:24

## 2023-01-29 RX ADMIN — ONDANSETRON 4 MG: 2 INJECTION INTRAMUSCULAR; INTRAVENOUS at 16:10

## 2023-01-29 RX ADMIN — SODIUM CHLORIDE, PRESERVATIVE FREE 40 MG: 5 INJECTION INTRAVENOUS at 08:08

## 2023-01-29 RX ADMIN — ONDANSETRON 4 MG: 2 INJECTION INTRAMUSCULAR; INTRAVENOUS at 10:03

## 2023-01-29 RX ADMIN — CIPROFLOXACIN 400 MG: 2 INJECTION, SOLUTION INTRAVENOUS at 10:03

## 2023-01-29 ASSESSMENT — PAIN DESCRIPTION - LOCATION: LOCATION: HEAD

## 2023-01-29 ASSESSMENT — PAIN DESCRIPTION - ORIENTATION: ORIENTATION: MID

## 2023-01-29 ASSESSMENT — PAIN SCALES - GENERAL: PAINLEVEL_OUTOF10: 9

## 2023-01-29 ASSESSMENT — PAIN DESCRIPTION - DESCRIPTORS: DESCRIPTORS: ACHING

## 2023-01-29 NOTE — FLOWSHEET NOTE
Pt admitted to [phone] [id number] per Darwin Edwards from ED. Complains of abdominal pain, nausea, and vomiting. IV in the right upper arm. IV site free of s/s of infection or infiltration. PT denies any pain at this time. Instructed in use of call light, tv controls, bed controls and 5 minute rule scripted to pt with understanding verbalized. Fall and safety brochure discussed with pt.

## 2023-01-29 NOTE — PLAN OF CARE
Problem: Discharge Planning  Goal: Discharge to home or other facility with appropriate resources  Outcome: Progressing  Flowsheets (Taken 1/28/2023 2046 by Gwendolyn Bingham RN)  Discharge to home or other facility with appropriate resources:   Identify barriers to discharge with patient and caregiver   Arrange for needed discharge resources and transportation as appropriate   Arrange for interpreters to assist at discharge as needed   Identify discharge learning needs (meds, wound care, etc)     Problem: Skin/Tissue Integrity - Adult  Goal: Skin integrity remains intact  Outcome: Progressing  Flowsheets (Taken 1/29/2023 0852)  Skin Integrity Remains Intact: Monitor for areas of redness and/or skin breakdown     Problem: Gastrointestinal - Adult  Goal: Minimal or absence of nausea and vomiting  Outcome: Progressing  Flowsheets (Taken 1/29/2023 0852)  Minimal or absence of nausea and vomiting:   Administer IV fluids as ordered to ensure adequate hydration   Administer ordered antiemetic medications as needed     Problem: Infection - Adult  Goal: Absence of infection at discharge  Outcome: Progressing  Flowsheets (Taken 1/29/2023 5863)  Absence of infection at discharge:   Assess and monitor for signs and symptoms of infection   Monitor lab/diagnostic results   Administer medications as ordered     Problem: Metabolic/Fluid and Electrolytes - Adult  Goal: Electrolytes maintained within normal limits  Outcome: Progressing  Flowsheets (Taken 1/29/2023 0852)  Electrolytes maintained within normal limits: Monitor labs and assess patient for signs and symptoms of electrolyte imbalances

## 2023-01-29 NOTE — ED NOTES
ED to inpatient nurses report    Chief Complaint   Patient presents with    Abdominal Pain      Present to ED from home  LOC: alert and orientated to name, place, date  Vital signs   Vitals:    01/28/23 1450 01/28/23 1549 01/28/23 1644 01/28/23 1817   BP: (!) 141/79 137/83 131/84 (!) 141/83   Pulse: 76 79 67 70   Resp: 18 18 18 18   Temp:       TempSrc:       SpO2: 99% 99% 99% 99%   Weight:       Height:          Oxygen Baseline RA    Current needs required RA  LDAs:   Peripheral IV 01/28/23 Right Arm (Active)   Site Assessment Clean, dry & intact 01/28/23 1550   Line Status Blood return noted; Flushed;Specimen collected 01/28/23 1336     Mobility: Independent  Pending ED orders: antibiotics  Present condition: stable      C-SSRS    Swallow Screening    Preferred Language: English     Electronically signed by Candida Anthony RN on 1/28/2023 at 7:13 PM       Candida Anthony, [address] [place] [address]  01/28/23 3963

## 2023-01-30 ENCOUNTER — ANESTHESIA EVENT (OUTPATIENT)
Dept: OPERATING ROOM | Age: 23
DRG: 418 | End: 2023-01-30
Payer: COMMERCIAL

## 2023-01-30 ENCOUNTER — ANESTHESIA (OUTPATIENT)
Dept: OPERATING ROOM | Age: 23
DRG: 418 | End: 2023-01-30
Payer: COMMERCIAL

## 2023-01-30 PROCEDURE — 2709999900 HC NON-CHARGEABLE SUPPLY: Performed by: SURGERY

## 2023-01-30 PROCEDURE — 6360000002 HC RX W HCPCS

## 2023-01-30 PROCEDURE — 1200000000 HC SEMI PRIVATE

## 2023-01-30 PROCEDURE — 2500000003 HC RX 250 WO HCPCS: Performed by: SURGERY

## 2023-01-30 PROCEDURE — 6370000000 HC RX 637 (ALT 250 FOR IP): Performed by: SURGERY

## 2023-01-30 PROCEDURE — 3700000001 HC ADD 15 MINUTES (ANESTHESIA): Performed by: SURGERY

## 2023-01-30 PROCEDURE — 3600000013 HC SURGERY LEVEL 3 ADDTL 15MIN: Performed by: SURGERY

## 2023-01-30 PROCEDURE — 2500000003 HC RX 250 WO HCPCS

## 2023-01-30 PROCEDURE — 6360000002 HC RX W HCPCS: Performed by: SURGERY

## 2023-01-30 PROCEDURE — 2580000003 HC RX 258: Performed by: SURGERY

## 2023-01-30 PROCEDURE — 2580000003 HC RX 258

## 2023-01-30 PROCEDURE — 7100000001 HC PACU RECOVERY - ADDTL 15 MIN: Performed by: SURGERY

## 2023-01-30 PROCEDURE — 6360000002 HC RX W HCPCS: Performed by: ANESTHESIOLOGY

## 2023-01-30 PROCEDURE — 3600000003 HC SURGERY LEVEL 3 BASE: Performed by: SURGERY

## 2023-01-30 PROCEDURE — 3700000000 HC ANESTHESIA ATTENDED CARE: Performed by: SURGERY

## 2023-01-30 PROCEDURE — 7100000000 HC PACU RECOVERY - FIRST 15 MIN: Performed by: SURGERY

## 2023-01-30 PROCEDURE — 88304 TISSUE EXAM BY PATHOLOGIST: CPT

## 2023-01-30 RX ORDER — SODIUM CHLORIDE 9 MG/ML
INJECTION, SOLUTION INTRAVENOUS CONTINUOUS PRN
Status: DISCONTINUED | OUTPATIENT
Start: 2023-01-30 | End: 2023-01-30 | Stop reason: SDUPTHER

## 2023-01-30 RX ORDER — BUPIVACAINE HYDROCHLORIDE 5 MG/ML
INJECTION, SOLUTION PERINEURAL PRN
Status: DISCONTINUED | OUTPATIENT
Start: 2023-01-30 | End: 2023-01-30 | Stop reason: ALTCHOICE

## 2023-01-30 RX ORDER — ONDANSETRON 2 MG/ML
4 INJECTION INTRAMUSCULAR; INTRAVENOUS
Status: ACTIVE | OUTPATIENT
Start: 2023-01-30 | End: 2023-01-31

## 2023-01-30 RX ORDER — HYDRALAZINE HYDROCHLORIDE 20 MG/ML
10 INJECTION INTRAMUSCULAR; INTRAVENOUS
Status: DISCONTINUED | OUTPATIENT
Start: 2023-01-30 | End: 2023-01-31 | Stop reason: HOSPADM

## 2023-01-30 RX ORDER — LABETALOL HYDROCHLORIDE 5 MG/ML
10 INJECTION, SOLUTION INTRAVENOUS
Status: DISCONTINUED | OUTPATIENT
Start: 2023-01-30 | End: 2023-01-31 | Stop reason: HOSPADM

## 2023-01-30 RX ORDER — ONDANSETRON 2 MG/ML
INJECTION INTRAMUSCULAR; INTRAVENOUS PRN
Status: DISCONTINUED | OUTPATIENT
Start: 2023-01-30 | End: 2023-01-30 | Stop reason: SDUPTHER

## 2023-01-30 RX ORDER — DROPERIDOL 2.5 MG/ML
INJECTION, SOLUTION INTRAMUSCULAR; INTRAVENOUS
Status: COMPLETED
Start: 2023-01-30 | End: 2023-01-30

## 2023-01-30 RX ORDER — FENTANYL CITRATE 50 UG/ML
50 INJECTION, SOLUTION INTRAMUSCULAR; INTRAVENOUS EVERY 5 MIN PRN
Status: DISCONTINUED | OUTPATIENT
Start: 2023-01-30 | End: 2023-01-31 | Stop reason: HOSPADM

## 2023-01-30 RX ORDER — SODIUM CHLORIDE 0.9 % (FLUSH) 0.9 %
5-40 SYRINGE (ML) INJECTION PRN
Status: DISCONTINUED | OUTPATIENT
Start: 2023-01-30 | End: 2023-01-31 | Stop reason: HOSPADM

## 2023-01-30 RX ORDER — ROCURONIUM BROMIDE 10 MG/ML
INJECTION, SOLUTION INTRAVENOUS PRN
Status: DISCONTINUED | OUTPATIENT
Start: 2023-01-30 | End: 2023-01-30 | Stop reason: SDUPTHER

## 2023-01-30 RX ORDER — DEXAMETHASONE SODIUM PHOSPHATE 10 MG/ML
INJECTION, EMULSION INTRAMUSCULAR; INTRAVENOUS PRN
Status: DISCONTINUED | OUTPATIENT
Start: 2023-01-30 | End: 2023-01-30 | Stop reason: SDUPTHER

## 2023-01-30 RX ORDER — MORPHINE SULFATE 2 MG/ML
2 INJECTION, SOLUTION INTRAMUSCULAR; INTRAVENOUS EVERY 5 MIN PRN
Status: DISCONTINUED | OUTPATIENT
Start: 2023-01-30 | End: 2023-01-31 | Stop reason: HOSPADM

## 2023-01-30 RX ORDER — SODIUM CHLORIDE 9 MG/ML
25 INJECTION, SOLUTION INTRAVENOUS PRN
Status: DISCONTINUED | OUTPATIENT
Start: 2023-01-30 | End: 2023-01-31 | Stop reason: HOSPADM

## 2023-01-30 RX ORDER — DROPERIDOL 2.5 MG/ML
0.62 INJECTION, SOLUTION INTRAMUSCULAR; INTRAVENOUS
Status: ACTIVE | OUTPATIENT
Start: 2023-01-30 | End: 2023-01-31

## 2023-01-30 RX ORDER — MIDAZOLAM HYDROCHLORIDE 1 MG/ML
INJECTION INTRAMUSCULAR; INTRAVENOUS PRN
Status: DISCONTINUED | OUTPATIENT
Start: 2023-01-30 | End: 2023-01-30 | Stop reason: SDUPTHER

## 2023-01-30 RX ORDER — DROPERIDOL 2.5 MG/ML
0.62 INJECTION, SOLUTION INTRAMUSCULAR; INTRAVENOUS ONCE
Status: COMPLETED | OUTPATIENT
Start: 2023-01-30 | End: 2023-01-30

## 2023-01-30 RX ORDER — MEPERIDINE HYDROCHLORIDE 25 MG/ML
12.5 INJECTION INTRAMUSCULAR; INTRAVENOUS; SUBCUTANEOUS EVERY 5 MIN PRN
Status: DISCONTINUED | OUTPATIENT
Start: 2023-01-30 | End: 2023-01-31 | Stop reason: HOSPADM

## 2023-01-30 RX ORDER — SODIUM CHLORIDE 0.9 % (FLUSH) 0.9 %
5-40 SYRINGE (ML) INJECTION EVERY 12 HOURS SCHEDULED
Status: DISCONTINUED | OUTPATIENT
Start: 2023-01-30 | End: 2023-01-31 | Stop reason: HOSPADM

## 2023-01-30 RX ORDER — DIPHENHYDRAMINE HYDROCHLORIDE 50 MG/ML
12.5 INJECTION INTRAMUSCULAR; INTRAVENOUS
Status: ACTIVE | OUTPATIENT
Start: 2023-01-30 | End: 2023-01-31

## 2023-01-30 RX ORDER — FENTANYL CITRATE 50 UG/ML
INJECTION, SOLUTION INTRAMUSCULAR; INTRAVENOUS PRN
Status: DISCONTINUED | OUTPATIENT
Start: 2023-01-30 | End: 2023-01-30 | Stop reason: SDUPTHER

## 2023-01-30 RX ORDER — LORAZEPAM 2 MG/ML
0.5 INJECTION INTRAMUSCULAR
Status: ACTIVE | OUTPATIENT
Start: 2023-01-30 | End: 2023-01-31

## 2023-01-30 RX ORDER — PROPOFOL 10 MG/ML
INJECTION, EMULSION INTRAVENOUS PRN
Status: DISCONTINUED | OUTPATIENT
Start: 2023-01-30 | End: 2023-01-30 | Stop reason: SDUPTHER

## 2023-01-30 RX ORDER — LABETALOL HYDROCHLORIDE 5 MG/ML
INJECTION, SOLUTION INTRAVENOUS PRN
Status: DISCONTINUED | OUTPATIENT
Start: 2023-01-30 | End: 2023-01-30 | Stop reason: SDUPTHER

## 2023-01-30 RX ORDER — HYDRALAZINE HYDROCHLORIDE 20 MG/ML
INJECTION INTRAMUSCULAR; INTRAVENOUS
Status: DISPENSED
Start: 2023-01-30 | End: 2023-01-30

## 2023-01-30 RX ORDER — IPRATROPIUM BROMIDE AND ALBUTEROL SULFATE 2.5; .5 MG/3ML; MG/3ML
1 SOLUTION RESPIRATORY (INHALATION)
Status: ACTIVE | OUTPATIENT
Start: 2023-01-30 | End: 2023-01-31

## 2023-01-30 RX ORDER — OXYCODONE HYDROCHLORIDE AND ACETAMINOPHEN 5; 325 MG/1; MG/1
1 TABLET ORAL EVERY 4 HOURS PRN
Status: DISCONTINUED | OUTPATIENT
Start: 2023-01-30 | End: 2023-01-31 | Stop reason: HOSPADM

## 2023-01-30 RX ADMIN — OXYCODONE AND ACETAMINOPHEN 1 TABLET: 5; 325 TABLET ORAL at 15:12

## 2023-01-30 RX ADMIN — SUGAMMADEX 200 MG: 100 INJECTION, SOLUTION INTRAVENOUS at 10:12

## 2023-01-30 RX ADMIN — HYDROMORPHONE HYDROCHLORIDE 0.5 MG: 1 INJECTION, SOLUTION INTRAMUSCULAR; INTRAVENOUS; SUBCUTANEOUS at 13:24

## 2023-01-30 RX ADMIN — DROPERIDOL 0.62 MG: 2.5 INJECTION, SOLUTION INTRAMUSCULAR; INTRAVENOUS at 10:25

## 2023-01-30 RX ADMIN — CIPROFLOXACIN 400 MG: 2 INJECTION, SOLUTION INTRAVENOUS at 08:32

## 2023-01-30 RX ADMIN — HYDROMORPHONE HYDROCHLORIDE 0.5 MG: 1 INJECTION, SOLUTION INTRAMUSCULAR; INTRAVENOUS; SUBCUTANEOUS at 10:30

## 2023-01-30 RX ADMIN — ONDANSETRON 4 MG: 2 INJECTION INTRAMUSCULAR; INTRAVENOUS at 09:17

## 2023-01-30 RX ADMIN — Medication 5 MG: at 10:06

## 2023-01-30 RX ADMIN — FENTANYL CITRATE 50 MCG: 50 INJECTION, SOLUTION INTRAMUSCULAR; INTRAVENOUS at 10:12

## 2023-01-30 RX ADMIN — Medication 100 MG: at 09:25

## 2023-01-30 RX ADMIN — PROPOFOL 200 MG: 10 INJECTION, EMULSION INTRAVENOUS at 09:25

## 2023-01-30 RX ADMIN — FENTANYL CITRATE 50 MCG: 50 INJECTION, SOLUTION INTRAMUSCULAR; INTRAVENOUS at 09:45

## 2023-01-30 RX ADMIN — MIDAZOLAM 2 MG: 1 INJECTION INTRAMUSCULAR; INTRAVENOUS at 09:19

## 2023-01-30 RX ADMIN — DEXAMETHASONE SODIUM PHOSPHATE 5 MG: 10 INJECTION, EMULSION INTRAMUSCULAR; INTRAVENOUS at 09:33

## 2023-01-30 RX ADMIN — ROCURONIUM BROMIDE 50 MG: 50 INJECTION INTRAVENOUS at 09:25

## 2023-01-30 RX ADMIN — Medication 5 MG: at 10:25

## 2023-01-30 RX ADMIN — SODIUM CHLORIDE: 9 INJECTION, SOLUTION INTRAVENOUS at 18:12

## 2023-01-30 RX ADMIN — CIPROFLOXACIN 400 MG: 2 INJECTION, SOLUTION INTRAVENOUS at 09:30

## 2023-01-30 RX ADMIN — OXYCODONE AND ACETAMINOPHEN 1 TABLET: 5; 325 TABLET ORAL at 21:12

## 2023-01-30 RX ADMIN — ONDANSETRON 4 MG: 2 INJECTION INTRAMUSCULAR; INTRAVENOUS at 15:12

## 2023-01-30 RX ADMIN — SODIUM CHLORIDE: 9 INJECTION, SOLUTION INTRAVENOUS at 09:19

## 2023-01-30 RX ADMIN — FENTANYL CITRATE 100 MCG: 50 INJECTION, SOLUTION INTRAMUSCULAR; INTRAVENOUS at 09:25

## 2023-01-30 RX ADMIN — HYDROMORPHONE HYDROCHLORIDE 0.5 MG: 1 INJECTION, SOLUTION INTRAMUSCULAR; INTRAVENOUS; SUBCUTANEOUS at 10:25

## 2023-01-30 ASSESSMENT — PAIN DESCRIPTION - DESCRIPTORS
DESCRIPTORS: ACHING
DESCRIPTORS: ACHING
DESCRIPTORS: SHARP;STABBING
DESCRIPTORS: ACHING
DESCRIPTORS: ACHING

## 2023-01-30 ASSESSMENT — PAIN DESCRIPTION - LOCATION
LOCATION: ABDOMEN

## 2023-01-30 ASSESSMENT — PAIN SCALES - GENERAL
PAINLEVEL_OUTOF10: 6
PAINLEVEL_OUTOF10: 5
PAINLEVEL_OUTOF10: 7
PAINLEVEL_OUTOF10: 4
PAINLEVEL_OUTOF10: 8
PAINLEVEL_OUTOF10: 6
PAINLEVEL_OUTOF10: 8
PAINLEVEL_OUTOF10: 6

## 2023-01-30 ASSESSMENT — PAIN DESCRIPTION - PAIN TYPE
TYPE: SURGICAL PAIN
TYPE: SURGICAL PAIN

## 2023-01-30 ASSESSMENT — PAIN DESCRIPTION - FREQUENCY: FREQUENCY: CONTINUOUS

## 2023-01-30 ASSESSMENT — PAIN - FUNCTIONAL ASSESSMENT: PAIN_FUNCTIONAL_ASSESSMENT: ACTIVITIES ARE NOT PREVENTED

## 2023-01-30 ASSESSMENT — PAIN DESCRIPTION - ORIENTATION
ORIENTATION: MID
ORIENTATION: MID

## 2023-01-30 NOTE — OP NOTE
[address] [id number]                                OPERATIVE REPORT    PATIENT NAME: David Bae                     :        2000  MED REC NO:   [id number]                           ROOM:  ACCOUNT NO:   [de-identified]                           ADMIT DATE: 2023  PROVIDER:     Roshni Huddleston. Keila Pierce MD    DATE OF PROCEDURE:  2023    PREOPERATIVE DIAGNOSES:  Cholelithiasis, cholecystitis. POSTOPERATIVE DIAGNOSES:  Cholelithiasis, cholecystitis. OPERATION:  Laparoscopic cholecystectomy. SURGEON:  Roshni Huddleston. MD Georgie    ANESTHESIA:  General.    COMPLICATIONS:  None. INDICATIONS FOR PROCEDURE:  The patient is a 51-year-old white female  with cholelithiasis and acute cholecystitis. FINDINGS:  The patient did have multiple small stones in the neck of the  gallbladder. The cystic duct was short small. I elected not to do a  cholangiogram.  Standard cholecystectomy was performed. DESCRIPTION OF THE PROCEDURE:  The patient was brought to the operating  suite, placed supine on the operating room table. After adequate  inhalational anesthesia was administered, the patient's abdomen was  prepped and draped in usual sterile fashion. An incision was made  vertically above the umbilicus where there appeared to be a previous  incision. We used an OptiView trocar to gain access into the abdominal  cavity. This was accomplished, and then the CO2 was insufflated to a  pressure of 15. The epigastric port was then placed and there were  noted to be adhesions of the peritoneum, and since hiding the right  abdominal wall, so these had to be taken down. There were omental  adhesions. Once this was accomplished, the other two trocars were  placed in the right lateral abdominal wall. The gallbladder dome could  be seen, was irrigated.   There was a fatty liver, but was not so bad  that we could not get good elevation of the gallbladder. The neck of  the gallbladder was then grasped, retracted laterally obtaining the  critical view, and we could see stones in the distal neck of the  gallbladder. We dissected out a short cystic duct. Two clips were  placed on the common duct side, one on the gallbladder side and it was  divided. The cystic artery was likewise divided, and the gallbladder  was removed from liver bed using electrocautery. It was placed in an  EndoCatch and delivered out of the abdominal cavity via the epigastric  stab wound. The air around the liver was irrigated, suctioned out and  closure was begun. Trocars were removed as air was aspirated out of the  abdominal cavity. Interrupted 4-0 Vicryl was used to close the skin. Steri-Strips were applied. CATHIE PANDEY [facility], MD    D: 01/30/2023 10:46:50       T: 01/30/2023 10:50:43     [email or web address]  Job#: [id number]     Doc#: [id number]    CC:

## 2023-01-30 NOTE — ANESTHESIA PRE PROCEDURE
Department of Anesthesiology  Preprocedure Note       Name:  Kathleen Dodson   Age:  21 y.o.  :  2000                                          MRN:  [id number]         Date:  2023      Surgeon: Mariah Rosas):  Fannie New MD    Procedure: Procedure(s):  Laparoscopic Cholecystectomy possible Open    Medications prior to admission:   Prior to Admission medications    Medication Sig Start Date End Date Taking?  Authorizing Provider   ibuprofen (ADVIL;MOTRIN) 600 MG tablet Take 1 tablet by mouth every 6 hours as needed for Pain 22   Rachel Alexandre MD   acetaminophen (AMINOFEN) 325 MG tablet Take 2 tablets by mouth every 6 hours as needed for Pain 22   Rachel Alexandre MD   NIFEdipine (PROCARDIA XL) 30 MG extended release tablet Take 1 tablet by mouth daily  Patient not taking: Reported on 2023   Rachel Alexandre MD   Prenatal Vit-Fe Fumarate-FA (PRENATAL 1+1 PO) Take by mouth  Patient not taking: Reported on 2023    Historical Provider, MD   Prenatal MV-Min-Fe Fum-FA-DHA (PRENATAL 1 PO) Take by mouth  22  Historical Provider, MD       Current medications:    Current Facility-Administered Medications   Medication Dose Route Frequency Provider Last Rate Last Admin    acetaminophen (TYLENOL) tablet 650 mg  650 mg Oral Q6H PRN Fannie New MD   650 mg at 23 1724    HYDROmorphone (DILAUDID) injection 0.5 mg  0.5 mg IntraVENous Once Valentino Rumple, MD        0.9 % sodium chloride infusion   IntraVENous Continuous Fannie New  mL/hr at 23 1445 New Bag at 23 1445    sodium chloride flush 0.9 % injection 5-40 mL  5-40 mL IntraVENous 2 times per day Fannie New MD   10 mL at 23 2101    sodium chloride flush 0.9 % injection 5-40 mL  5-40 mL IntraVENous PRN Fannie New MD        0.9 % sodium chloride infusion   IntraVENous PRN Fannie New MD        ondansetron (ZOFRAN-ODT) disintegrating tablet 4 mg  4 mg Oral Q8H PRN Brandon Avery MD        Or    ondansetron [facility]) injection 4 mg  4 mg IntraVENous Q6H PRN Brandon Avery MD   4 mg at 01/29/23 1610    HYDROmorphone (DILAUDID) injection 0.5 mg  0.5 mg IntraVENous Q3H PRN Brandon Avery MD        pantoprazole (PROTONIX) 40 mg in sodium chloride (PF) 0.9 % 10 mL injection  40 mg IntraVENous Daily Brandon Avery MD   40 mg at 01/29/23 0808    ciprofloxacin (CIPRO) IVPB 400 mg  400 mg IntraVENous Q12H Brandon Avery  mL/hr at 01/30/23 0832 400 mg at 01/30/23 8938       Allergies:  No Known Allergies    Problem List:    Patient Active Problem List   Diagnosis Code    Obesity E66.9    Pre-diabetes R73.03    Bleeding R58    Vaginal spotting N93.9    Fall at home, initial encounter [address] [address]. Eric David, Y92.009    Pregnancy complication, antepartum O26.90    Pregnancy complication before birth O80.65    Vaginal delivery O80    Acute cholecystitis K81.0       Past Medical History:        Diagnosis Date    Polycystic ovary disease        Past Surgical History:        Procedure Laterality Date    CYST REMOVAL Left     wrist    TONSILLECTOMY AND ADENOIDECTOMY         Social History:    Social History     Tobacco Use    Smoking status: Never    Smokeless tobacco: Never   Substance Use Topics    Alcohol use:  No                                Counseling given: No      Vital Signs (Current):   Vitals:    01/29/23 1549 01/29/23 2015 01/30/23 0346 01/30/23 0815   BP: (!) 144/90 129/76 126/72 131/76   Pulse: 76 72 64 62   Resp: 22 20 18 18   Temp: 98 °F (36.7 °C) 98 °F (36.7 °C) 98.1 °F (36.7 °C) 97.7 °F (36.5 °C)   TempSrc: Oral Oral Oral Oral   SpO2: 97% 98% 97% 100%   Weight:       Height:                                                  BP Readings from Last 3 Encounters:   01/30/23 131/76   10/24/22 132/80   09/19/22 133/78       NPO Status:                                                                                 BMI: Wt Readings from Last 3 Encounters:   01/28/23 (!) 350 lb (158.8 kg)   10/24/22 (!) 360 lb (163.3 kg)   09/19/22 (!) 380 lb (172.4 kg)     Body mass index is 56.49 kg/m². CBC:   Lab Results   Component Value Date/Time    WBC 7.6 01/29/2023 06:16 AM    RBC 4.40 01/29/2023 06:16 AM    HGB 12.4 01/29/2023 06:16 AM    HCT 39.6 01/29/2023 06:16 AM    MCV 90.0 01/29/2023 06:16 AM    RDW 12.1 12/22/2021 10:55 AM     01/29/2023 06:16 AM       CMP:   Lab Results   Component Value Date/Time     01/28/2023 01:30 PM    K 3.6 01/28/2023 01:30 PM    K 3.8 10/24/2022 01:00 AM     01/28/2023 01:30 PM    CO2 24 01/28/2023 01:30 PM    BUN 11 01/28/2023 01:30 PM    CREATININE 0.7 01/28/2023 01:30 PM    GFRAA >60 12/22/2021 10:55 AM    LABGLOM >60 01/28/2023 01:30 PM    GLUCOSE 98 01/28/2023 01:30 PM    PROT 7.1 01/28/2023 01:30 PM    CALCIUM 9.5 01/28/2023 01:30 PM    BILITOT 0.3 01/28/2023 01:30 PM    ALKPHOS 50 01/28/2023 01:30 PM    AST 34 01/28/2023 01:30 PM    ALT 29 01/28/2023 01:30 PM       POC Tests: No results for input(s): POCGLU, POCNA, POCK, POCCL, POCBUN, POCHEMO, POCHCT in the last 72 hours.     Coags: No results found for: PROTIME, INR, APTT    HCG (If Applicable):   Lab Results   Component Value Date    PREGSERUM NEGATIVE 01/28/2023        ABGs: No results found for: PHART, PO2ART, URC8QBY, GGF9MNL, BEART, S2BTYNQU     Type & Screen (If Applicable):  Lab Results   Component Value Date    LABRH POS 09/15/2022       Drug/Infectious Status (If Applicable):  No results found for: HIV, HEPCAB    COVID-19 Screening (If Applicable):   Lab Results   Component Value Date/Time    COVID19 NOT  DETECTED 12/25/2021 10:38 PM    COVID19 DETECTED 09/29/2021 05:30 PM           Anesthesia Evaluation    Airway: Mallampati: III          Dental:          Pulmonary:       (-) COPD                           Cardiovascular:                      Neuro/Psych:               GI/Hepatic/Renal:   (+) morbid obesity Endo/Other:                     Abdominal:             Vascular: Other Findings:           Anesthesia Plan      general     ASA 2       Induction: intravenous. Anesthetic plan and risks discussed with patient.                         Yasmin Nevarez MD   1/30/2023

## 2023-01-30 NOTE — ANESTHESIA POSTPROCEDURE EVALUATION
Department of Anesthesiology  Postprocedure Note    Patient: Hilary Hughes  MRN: [id number]  YOB: 2000  Date of evaluation: 1/30/2023      Procedure Summary     Date: 01/30/23 Room / Location: [facility] 03 / [facility]    Anesthesia Start: 0915 Anesthesia Stop: 1250    Procedure: Laparoscopic Cholecystectomy (Abdomen) Diagnosis:       Cholecystitis, acute      (Cholecystitis, acute [K81.0])    Surgeons:  Kassi Kamara MD Responsible Provider: Sumaya Shine MD    Anesthesia Type: General ASA Status: 2          Anesthesia Type: General    Prudencio Phase I: Prudencio Score: 9    Prudencio Phase II:        Anesthesia Post Evaluation    Complications: no

## 2023-01-30 NOTE — CARE COORDINATION
Case Management Assessment  Initial Evaluation    Date/Time of Evaluation: 1/30/2023 2:29 PM  Assessment Completed by: Katherine Nieves RN    If patient is discharged prior to next notation, then this note serves as note for discharge by case management. Patient Name: Tiarra Avery                   YOB: 2000  Diagnosis: Acute cholecystitis [B18.0]  Biliary colic [T24.49]  Calculus of gallbladder without cholecystitis without obstruction [K80.20]  Nausea and vomiting, unspecified vomiting type [R11.2]                   Date / Time: 1/28/2023  1:21 PM  Location: [facility]57/057-A     Patient Admission Status: Inpatient   Readmission Risk (Low < 19, Mod (19-27), High > 27): Readmission Risk Score: 6.8    Current PCP: On File Not (Inactive)  PCP verified by CM? Yes (Wants set up with  on [facility])    Chart Reviewed: Yes      History Provided by: Other (see comment) (Mother)  Patient Orientation: Other (see comment) (Drowsy.)    Patient Cognition: Alert    Hospitalization in the last 30 days (Readmission):  No    If yes, Readmission Assessment in CM Navigator will be completed. Advance Directives:      Code Status: Full Code   Patient's Primary Decision Maker is: Legal Next of Kin      Discharge Planning:    Patient lives with: Children, Spouse/Significant Other Type of Home: House  Primary Care Giver: Self  Patient Support Systems include: Parent, Family Members   Current Financial resources:  ([employer] and [employer])  Current community resources:    Current services prior to admission: None            Current DME:              Type of Home Care services:  None    ADLS  Prior functional level: Independent in ADLs/IADLs  Current functional level: Independent in ADLs/IADLs    Family can provide assistance at DC: Yes  Would you like Case Management to discuss the discharge plan with any other family members/significant others, and if so, who?  No  Plans to Return to Present Housing: Yes  Other Identified Issues/Barriers to RETURNING to current housing: No  Potential Assistance needed at discharge: N/A            Potential DME:    Patient expects to discharge to: [address] [place] [address] for transportation at discharge: [facility] / Plan: Rakesh Duron / Product Type: *No Product type* /     Does insurance require precert for SNF: Yes    Potential assistance Purchasing Medications: No  Meds-to-Beds request: Yes      [id number] [address] [phone] - F [phone]  [address]  Phone: [phone] Fax: [phone]      Notes:    Factors facilitating achievement of predicted outcomes: Family support    Barriers to discharge: Medical complications    Additional Case Management Notes: Admitted from ER with abd pain. Imaging reveals cholelithiasis. Gen Surg following and planning lap cholecystectomy today. IVF at 125/hr. IV Cipro. IV Protonix. NPO. Procedure: No.    The Plan for Transition of Care is related to the following treatment goals of Acute cholecystitis [L39.0]  Biliary colic [D35.56]  Calculus of gallbladder without cholecystitis without obstruction [K80.20]  Nausea and vomiting, unspecified vomiting type [R11.2]    Patient Goals/Plan/Treatment Preferences: Met with pt and several family members this afternoon. She resides with family but is self sufficient with her needs. She works and drives. She needs a PCP and would like to go back to the office where her previous provider was. She would like  on [address]. No other discharge needs anticipated. Transportation/Food Security/Housekeeping Addressed: No issues identified.      Negrita Rivas RN  Case Management Department

## 2023-01-30 NOTE — PROGRESS NOTES
Temp 97.5  Swabs to bilateral nares per Kylie RN  Rings x 2 given to family. Bilateral nipple rings taped per Kylie RN, pt also ambulates to BR with assistance to void prior to OR.

## 2023-01-30 NOTE — H&P
[address] [place] [address]                       PREOPERATIVE HISTORY AND PHYSICAL    PATIENT NAME: João Dwyer                     :        2000  MED REC NO:   [id number]                           ROOM:       [id number]  ACCOUNT NO:   [de-identified]                           ADMIT DATE: 2023  PROVIDER:     Melvin Huitron. Royal Gaucher, MD    CHIEF COMPLAINT:  Cholelithiasis, mild acute cholecystitis. HISTORY OF PRESENT ILLNESS:  The patient is a 70-year-old female who is  G2, P2, AB0, with the youngest child 4 months delivering 4 months ago  who works at [employer] and states she got off work Friday evening two nights  ago and _____. She tried to go to sleep and then had onset of rather  significant epigastric pain that would just not resolve. She eventually  came to the emergency room yesterday afternoon/evening with this pain. She had a normal white count, normal liver function tests, but an  ultrasound revealed cholelithiasis, mild thick-walled gallbladder, and  the patient is being admitted for further evaluation. She was having a  lot of nausea and vomiting associated with this even. She had one  episode of diarrhea but otherwise denies any prior history of any  similar events. She did have some acid reflux during her pregnancy but  otherwise has no regular acid reflux or known ulcer disease. PAST MEDICAL HISTORY:  Positive for obesity, hypertension. She also has  a history of polycystic ovarian disease. She may be in a prediabetic  state. PAST SURGICAL HISTORY:  Surgeries include bilateral ganglion cyst  removed from the wrist and had remote tonsillectomy with adenoidectomy. MEDICATIONS:  Ibuprofen, Procardia, prenatal vitamins, and  acetaminophen. ALLERGIES:  NONE. FAMILY HISTORY:  She has a grandmother who had gallbladder disease,  otherwise no significant abnormalities.     SOCIAL HISTORY:  She is a nonsmoker and nondrinker but does occasionally  use marijuana. REVIEW OF SYSTEMS:  10-point review of systems is otherwise negative. PHYSICAL EXAMINATION:  GENERAL:  The patient is a 61-year-old obese female weighing 350 pounds. She is resting in bed _____. HEENT:  Pupils are equal.  EOMs are intact. Sclerae are clear. NECK:  Soft. CARDIAC:  S1, S2.  LUNGS:  Respirations are clear. ABDOMEN:  Abdomen is obese, has some mild pain to palpation noted of the  epigastric and right upper quadrant but otherwise the upper and lower  extremities within normal limits. Laboratory data revealed a white count of 7.6, hemoglobin of 12.4. The  patient's LFTs were normal.  She had ultrasound of the gallbladder being  read as cholelithiasis with a thickened gallbladder wall suggestive of  acute cholecystitis. Urinalysis was negative. Electrolytes were  normal.  Lipase was normal.    ASSESSMENT/PLAN:  Cholelithiasis with what sounds like a  gallbladder-related attack. We discussed gallbladder disease with the  patient, and we discussed the laparoscopic removal of the gallbladder. We did discuss the potential for injury to the common bile duct and  bleeding which would require an open procedure and possibly to transfer  to tertiary center. She voices understanding, would like to proceed. We will proceed with laparoscopic cholecystectomy tomorrow morning. CATHIE PANDEY [facility], MD    D: 01/29/2023 13:52:02       T: 01/29/2023 13:55:57     [id number]  Job#: [id number]     Doc#: [id number]    CC:

## 2023-01-30 NOTE — PROGRESS NOTES
1024 Awake and oriented on arrival to PACU , pt tearful and c/o # 10 ABD pain and nausea  1025 medicated with Dilaudid 0.5 mg IV and Droperidol 0.625 mg IV  1030 pain unchanged , medicated with Dilaudid 0.625 mg IV  1035 eyes closed resp easy , o2 off   1050 eyes closed resp easy   1110 awakens easily to name , states pain a # 10 but states it tolerable   1112 meets criteria for discharge , transported to [facility]

## 2023-01-30 NOTE — PROGRESS NOTES
Amira Smith is sitting down in a recliner with her eyes closed. Her mother, grandmother, sister and great niece are in the room supporting her. She seemed to tired for me to stay long. I offered prayer for her continued healing and exited the room. 01/30/23 1400   Encounter Summary   Encounter Overview/Reason  Initial Encounter   Service Provided For: Patient and family together   Referral/Consult From: [address] [place] [address] Parent; Children   Last Encounter  01/30/23   Complexity of Encounter Low   Assessment/Intervention/Outcome   Assessment Coping   Intervention Prayer (assurance of)/[place]   Care Plan:  Continue spiritual and emotional care for patient and family. Including prayers.

## 2023-01-30 NOTE — BRIEF OP NOTE
Brief Postoperative Note      Patient: Roman Breen  YOB: 2000  MRN: [id number]    Date of Procedure: 1/30/2023    Pre-Op Diagnosis: Cholecystitis, acute /Cholelithiasis    Post-Op Diagnosis: same       Procedure(s):  Laparoscopic Cholecystectomy    Surgeon(s):  Lisa Hoskins MD    Assistant:      Anesthesia: General    Estimated Blood Loss (mL): 5 ml    Complications: none    Specimens:   ID Type Source Tests Collected by Time Destination   A :  Tissue Gallbladder SURGICAL PATHOLOGY Lisa Hoskins MD 1/30/2023 1005        Implants:        Drains none    Findings: see op note    Electronically signed by Lisa Hoskins MD on 1/30/2023 at 10:29 AM

## 2023-01-30 NOTE — PROGRESS NOTES
Comprehensive Nutrition Assessment    Type and Reason for Visit:  Initial, Positive Nutrition Screen (poor po/appetite/unplanned wt loss)    Nutrition Recommendations/Plan:   Send Alejandro BID. Consider lowfat diet as appropriate. Consider MVI     Malnutrition Assessment:  Malnutrition Status:  Insufficient data (01/30/23 0537)    Context:  Acute Illness     Findings of the 6 clinical characteristics of malnutrition:  Energy Intake:  Mild decrease in energy intake (Comment)  Weight Loss:  Unable to assess (no actual wt)     Body Fat Loss:  No significant body fat loss     Muscle Mass Loss:  No significant muscle mass loss    Fluid Accumulation:  Unable to assess     Strength:  Not Performed    Nutrition Assessment:     Pt. nutritionally compromised AEB wounds. At risk for further nutrition compromise r/t admit with Cholelithiasis, Acute Cholecystitis increased nutrient needs for wound healing s/p cholecystectomy (1/30) underlying medical condition (polycystic ovary disease , obesity, HTN). Nutrition Related Findings:    Pt. Report/Treatments/Miscellaneous: Pt seen, just had surgery earlier today, lethargic, eyes closed, had not eaten lunch yet. Mentions has been trying to lose wt & tends to consume 2 meals/day ( will tend to skip either breakfast or lunch) Works second shift. Denies chewing/swallowing difficulty. Falling back asleep. GI Status: No BM. Pertinent Labs: reviewed  Pertinent Meds: reviewed     Wound Type: Surgical Incision ((1/30) cholecystectomy)       Current Nutrition Intake & Therapies:    Average Meal Intake: 0% (% prior to surgery ((1/30) per cht, was NPO earlier today for OR, new diet order for regular diet)  Average Supplements Intake:  (new)  ADULT DIET; Regular  ADULT ORAL NUTRITION SUPPLEMENT; Breakfast, Dinner;  Wound Healing Oral Supplement    Anthropometric Measures:  Height: 5' 6\" (167.6 cm)  Ideal Body Weight (IBW): 130 lbs (59 kg)    Admission Body Weight: 350 lb (158.8 kg) ((1/28) STATED WT)  Current Body Weight: 350 lb (158.8 kg) (stated wt (1/28)),   IBW.    Current BMI (kg/m2): 56.5  Usual Body Weight:  (Per pt 355#, Per EMR: (9/19/22) 380# ( intentional wt loss per pt since this time), (5/18/22) 370#, (10/6/20) 333# actual)                       BMI Categories: Obese Class 3 (BMI 40.0 or greater)    Estimated Daily Nutrient Needs:  Energy Requirements Based On: Kcal/kg  Weight Used for Energy Requirements:  (59kgm IBW)  Energy (kcal/day): 9001-9901 kcals for wound healing post op (25-30kcals/kgm)  Weight Used for Protein Requirements: Ideal (59kgm)  Protein (g/day): greater than 118 grams ( greater than 2 grams protein/kgm IBW)       Nutrition Diagnosis:   Increased nutrient needs related to increase demand for energy/nutrients as evidenced by wounds    Nutrition Interventions:   Food and/or Nutrient Delivery: Start Oral Nutrition Supplement (consider lowfat diet as appropriate)  Nutrition Education/Counseling: Education not appropriate  Coordination of Nutrition Care: Continue to monitor while inpatient       Goals:     Goals: PO intake 75% or greater, by next RD assessment       Nutrition Monitoring and Evaluation:      Food/Nutrient Intake Outcomes: Diet Advancement/Tolerance, Food and Nutrient Intake, Supplement Intake, Vitamin/Mineral Intake  Physical Signs/Symptoms Outcomes: Biochemical Data, GI Status, Fluid Status or Edema, Hemodynamic Status, Nutrition Focused Physical Findings, Skin, Weight    Discharge Planning:    Too soon to determine     Lois Rendon RD, LD  Contact: [phone]

## 2023-01-30 NOTE — PROGRESS NOTES
Pt returned from RR accompanied by family. Oriented to plan of care and post op orders. Pulse ox 82-85% .   Oxygen put on at 3 liters per nasal cannula and sats increased to 92-93%

## 2023-01-30 NOTE — PLAN OF CARE
Problem: Skin/Tissue Integrity - Adult  Goal: Skin integrity remains intact  1/30/2023 1143 by Curtis Belle RN  Outcome: Progressing  Flowsheets (Taken 1/30/2023 1143)  Skin Integrity Remains Intact:   Monitor for areas of redness and/or skin breakdown   Assess vascular access sites hourly   Every 4-6 hours minimum: Change oxygen saturation probe site     Problem: Gastrointestinal - Adult  Goal: Minimal or absence of nausea and vomiting  1/30/2023 1143 by Curtis Belle RN  Outcome: Progressing  Flowsheets (Taken 1/30/2023 0038 by Caterina Concepcion RN)  Minimal or absence of nausea and vomiting: Administer IV fluids as ordered to ensure adequate hydration     Problem: Infection - Adult  Goal: Absence of infection at discharge  1/30/2023 1143 by Curtis Belle RN  Outcome: Progressing  Flowsheets (Taken 1/30/2023 0038 by Caterina Concepcion RN)  Absence of infection at discharge: Assess and monitor for signs and symptoms of infection     Problem: Metabolic/Fluid and Electrolytes - Adult  Goal: Electrolytes maintained within normal limits  1/30/2023 1143 by Curtis Belle RN  Outcome: Progressing  Flowsheets (Taken 1/30/2023 0038 by Caterina Concepcion RN)  Electrolytes maintained within normal limits: Monitor labs and assess patient for signs and symptoms of electrolyte imbalances     Problem: Pain  Goal: Verbalizes/displays adequate comfort level or baseline comfort level  Outcome: Progressing  Flowsheets (Taken 1/30/2023 1143)  Verbalizes/displays adequate comfort level or baseline comfort level:   Encourage patient to monitor pain and request assistance   Administer analgesics based on type and severity of pain and evaluate response   Consider cultural and social influences on pain and pain management   Notify Licensed Independent Practitioner if interventions unsuccessful or patient reports new pain   Implement non-pharmacological measures as appropriate and evaluate response   Assess pain using appropriate pain scale   Care plan reviewed with patient and family. Patient and family verbalize understanding of the plan of care and contribute to goal setting.

## 2023-01-30 NOTE — PLAN OF CARE
Problem: Discharge Planning  Goal: Discharge to home or other facility with appropriate resources  Outcome: Progressing  Flowsheets (Taken 1/30/2023 0038)  Discharge to home or other facility with appropriate resources:   Identify barriers to discharge with patient and caregiver   Arrange for needed discharge resources and transportation as appropriate   Identify discharge learning needs (meds, wound care, etc)   Arrange for interpreters to assist at discharge as needed     Problem: Skin/Tissue Integrity - Adult  Goal: Skin integrity remains intact  Outcome: Progressing  Flowsheets (Taken 1/30/2023 0038)  Skin Integrity Remains Intact:   Monitor for areas of redness and/or skin breakdown   Assess vascular access sites hourly     Problem: Gastrointestinal - Adult  Goal: Minimal or absence of nausea and vomiting  Outcome: Progressing  Flowsheets (Taken 1/30/2023 0038)  Minimal or absence of nausea and vomiting: Administer IV fluids as ordered to ensure adequate hydration     Problem: Infection - Adult  Goal: Absence of infection at discharge  Outcome: Progressing  Flowsheets (Taken 1/30/2023 0038)  Absence of infection at discharge: Assess and monitor for signs and symptoms of infection     Problem: Metabolic/Fluid and Electrolytes - Adult  Goal: Electrolytes maintained within normal limits  Outcome: Progressing  Flowsheets (Taken 1/30/2023 0038)  Electrolytes maintained within normal limits: Monitor labs and assess patient for signs and symptoms of electrolyte imbalances   Care plan reviewed with patient. Patient verbalize understanding of the plan of care and contribute to goal setting.

## 2023-01-31 VITALS
RESPIRATION RATE: 16 BRPM | OXYGEN SATURATION: 98 % | WEIGHT: 293 LBS | BODY MASS INDEX: 47.09 KG/M2 | HEART RATE: 62 BPM | SYSTOLIC BLOOD PRESSURE: 138 MMHG | HEIGHT: 66 IN | TEMPERATURE: 97.9 F | DIASTOLIC BLOOD PRESSURE: 87 MMHG

## 2023-01-31 PROCEDURE — 6360000002 HC RX W HCPCS: Performed by: SURGERY

## 2023-01-31 PROCEDURE — 6370000000 HC RX 637 (ALT 250 FOR IP): Performed by: SURGERY

## 2023-01-31 PROCEDURE — 2580000003 HC RX 258: Performed by: SURGERY

## 2023-01-31 RX ORDER — OXYCODONE HYDROCHLORIDE AND ACETAMINOPHEN 5; 325 MG/1; MG/1
1 TABLET ORAL EVERY 6 HOURS PRN
Qty: 16 TABLET | Refills: 0 | Status: SHIPPED | OUTPATIENT
Start: 2023-01-31 | End: 2023-02-05

## 2023-01-31 RX ADMIN — ONDANSETRON 4 MG: 2 INJECTION INTRAMUSCULAR; INTRAVENOUS at 04:04

## 2023-01-31 RX ADMIN — OXYCODONE AND ACETAMINOPHEN 1 TABLET: 5; 325 TABLET ORAL at 03:56

## 2023-01-31 RX ADMIN — SODIUM CHLORIDE: 9 INJECTION, SOLUTION INTRAVENOUS at 02:17

## 2023-01-31 ASSESSMENT — PAIN SCALES - WONG BAKER: WONGBAKER_NUMERICALRESPONSE: 4

## 2023-01-31 ASSESSMENT — PAIN - FUNCTIONAL ASSESSMENT
PAIN_FUNCTIONAL_ASSESSMENT: ACTIVITIES ARE NOT PREVENTED
PAIN_FUNCTIONAL_ASSESSMENT: ACTIVITIES ARE NOT PREVENTED

## 2023-01-31 ASSESSMENT — PAIN DESCRIPTION - DESCRIPTORS
DESCRIPTORS: ACHING
DESCRIPTORS: ACHING

## 2023-01-31 ASSESSMENT — PAIN SCALES - GENERAL: PAINLEVEL_OUTOF10: 9

## 2023-01-31 ASSESSMENT — PAIN DESCRIPTION - LOCATION
LOCATION: ABDOMEN
LOCATION: ABDOMEN

## 2023-01-31 ASSESSMENT — PAIN DESCRIPTION - ONSET: ONSET: ON-GOING

## 2023-01-31 ASSESSMENT — PAIN DESCRIPTION - PAIN TYPE: TYPE: SURGICAL PAIN

## 2023-01-31 ASSESSMENT — PAIN DESCRIPTION - ORIENTATION
ORIENTATION: MID;RIGHT
ORIENTATION: RIGHT;MID

## 2023-01-31 ASSESSMENT — PAIN DESCRIPTION - FREQUENCY: FREQUENCY: CONTINUOUS

## 2023-01-31 NOTE — PROGRESS NOTES
Went over "[employer] instructions, follow up appointments, and medications with pt and her mother. Pt sent home with work slip, chg, soap, and printed prescription, answered all questions at this time and both stated understanding.  Pt to be d/c home with family

## 2023-01-31 NOTE — CARE COORDINATION
1/31/23, 10:00 AM EST    DISCHARGE ON GOING EVALUATION    Jammie Plasencia day: 3  Location: [address] [place], [place] [address]A Reason for admit: Acute cholecystitis [C40.3]  Biliary colic [L49.20]  Calculus of gallbladder without cholecystitis without obstruction [K80.20]  Nausea and vomiting, unspecified vomiting type [R11.2]   Procedure: Laparoscopic cholecystectomy. Barriers to Discharge: POD #1. Today afebrile. Room air now. IVF at 125/hr. Reg diet. ONS for wound healing per Dietitian. PCP: KAL Green CNP  Readmission Risk Score: 6.5%  Patient Goals/Plan/Treatment Preferences: Plans return home with family who is supportive. No discharge needs anticipated. New PCP arranged for pt. To see Juan Hernandez in March. 1/31/23, 3:59 PM EST    Patient goals/plan/ treatment preferences discussed by  and . Patient goals/plan/ treatment preferences reviewed with patient/ family. Patient/ family verbalize understanding of discharge plan and are in agreement with goal/plan/treatment preferences. Understanding was demonstrated using the teach back method. AVS provided by RN at time of discharge, which includes all necessary medical information pertaining to the patients current course of illness, treatment, post-discharge goals of care, and treatment preferences. Services At/After Discharge: None    Discharged home with supportive family as prior to admission.

## 2023-01-31 NOTE — PROGRESS NOTES
Pt was sitting on chair beside her bed as her mother was visiting with her. She was dealing with acute cholecystitis. She said that she had gull bladder surgery and was getting better. She said that she hoped to be released today. She wanted prayer to cope and heal. Prayer was appreciated.     01/31/23 1408   Encounter Summary   Service Provided For: Patient and family together   Referral/Consult From: [address] [facility] Parent   Last Encounter  01/31/23   Complexity of Encounter Low   Begin Time 1256   End Time  1303   Total Time Calculated 7 min   Spiritual/Emotional needs   Type Spiritual Support   Assessment/Intervention/Outcome   Assessment Calm   Intervention Empowerment   Outcome Encouraged

## 2023-01-31 NOTE — PROGRESS NOTES
CHG soap given to patient. Instructions given on daily use of CHG to prevent infection. Patient voiced understanding.

## 2023-01-31 NOTE — PLAN OF CARE
Problem: Discharge Planning  Goal: Discharge to home or other facility with appropriate resources  Outcome: Progressing  Flowsheets (Taken 1/30/2023 1143 by Jessie Ramirez, RN)  Discharge to home or other facility with appropriate resources:   Identify barriers to discharge with patient and caregiver   Identify discharge learning needs (meds, wound care, etc)   Refer to discharge planning if patient needs post-hospital services based on physician order or complex needs related to functional status, cognitive ability or social support system     Problem: Skin/Tissue Integrity - Adult  Goal: Skin integrity remains intact  Outcome: Progressing  Flowsheets (Taken 1/30/2023 1143 by Jessie Ramirez, RN)  Skin Integrity Remains Intact:   Monitor for areas of redness and/or skin breakdown   Assess vascular access sites hourly   Every 4-6 hours minimum: Change oxygen saturation probe site     Problem: Gastrointestinal - Adult  Goal: Minimal or absence of nausea and vomiting  Outcome: Progressing  Flowsheets (Taken 1/30/2023 0038 by Jc Rodriguez RN)  Minimal or absence of nausea and vomiting: Administer IV fluids as ordered to ensure adequate hydration     Problem: Infection - Adult  Goal: Absence of infection at discharge  Outcome: Progressing  Flowsheets (Taken 1/30/2023 0038 by Jc Rodriguez, RN)  Absence of infection at discharge: Assess and monitor for signs and symptoms of infection     Problem: Metabolic/Fluid and Electrolytes - Adult  Goal: Electrolytes maintained within normal limits  Outcome: Progressing  Flowsheets (Taken 1/30/2023 0038 by Jc Rodriguez, RN)  Electrolytes maintained within normal limits: Monitor labs and assess patient for signs and symptoms of electrolyte imbalances     Problem: Pain  Goal: Verbalizes/displays adequate comfort level or baseline comfort level  Outcome: Progressing  Flowsheets (Taken 1/30/2023 1143 by Jessie Ramirez, RN)  Verbalizes/displays adequate comfort level or baseline comfort level:   Encourage patient to monitor pain and request assistance   Administer analgesics based on type and severity of pain and evaluate response   Consider cultural and social influences on pain and pain management   Notify Licensed Independent Practitioner if interventions unsuccessful or patient reports new pain   Implement non-pharmacological measures as appropriate and evaluate response   Assess pain using appropriate pain scale     Problem: Nutrition Deficit:  Goal: Optimize nutritional status  Outcome: Progressing  Flowsheets (Taken 1/30/2023 1544 by Ren Montoya, RD, LD)  Nutrient intake appropriate for improving, restoring, or maintaining nutritional needs:   Assess nutritional status and recommend course of action   Monitor oral intake, labs, and treatment plans   Recommend appropriate diets, oral nutritional supplements, and vitamin/mineral supplements

## 2023-02-01 NOTE — DISCHARGE SUMMARY
[address] [place] [address]                               DISCHARGE SUMMARY    PATIENT NAME: Imani Oviedo                     :        2000  MED REC NO:   [id number]                           ROOM:       0848  ACCOUNT NO:   [de-identified]                           ADMIT DATE: 2023  PROVIDER:     Hector Schrader. Helene Monet MD              [address] [place] [address] DATE: 2023    DISCHARGE DIAGNOSES:  Cholelithiasis with acute cholecystitis. OPERATION PERFORMED:  Laparoscopic cholecystectomy. HOSPITAL COURSE:  The patient is a 80-year-old white female, who was  admitted with biliary colic attack with gallstones on workup. The  patient was taken to Surgery on 2023, undergoing a laparoscopic  cholecystectomy. Postop day #1, she was doing well. Her abdomen was  soft. Pathology actually did come back showing chronic cholecystitis,  cholesterolosis with cholelithiasis. The patient will be discharged  home to resume any home meds, on Percocet for pain, and will be followed  up in the office. CATHIE PANDEY [facility], MD    D: 2023 13:43:16       T: 2023 14:11:55     [place]/JUANA  Job#: [id number]     Doc#: [id number]    CC:

## 2023-03-15 ENCOUNTER — HOSPITAL ENCOUNTER (EMERGENCY)
Age: 23
Discharge: HOME | End: 2023-03-16
Attending: STUDENT IN AN ORGANIZED HEALTH CARE EDUCATION/TRAINING PROGRAM
Payer: COMMERCIAL

## 2023-03-15 DIAGNOSIS — T78.2XXA ANAPHYLAXIS, INITIAL ENCOUNTER: Primary | ICD-10-CM

## 2023-03-15 PROCEDURE — 93005 ELECTROCARDIOGRAM TRACING: CPT | Performed by: STUDENT IN AN ORGANIZED HEALTH CARE EDUCATION/TRAINING PROGRAM

## 2023-03-15 PROCEDURE — 2580000003 HC RX 258: Performed by: STUDENT IN AN ORGANIZED HEALTH CARE EDUCATION/TRAINING PROGRAM

## 2023-03-15 PROCEDURE — 99284 EMERGENCY DEPT VISIT MOD MDM: CPT

## 2023-03-15 PROCEDURE — 96372 THER/PROPH/DIAG INJ SC/IM: CPT

## 2023-03-15 PROCEDURE — 96375 TX/PRO/DX INJ NEW DRUG ADDON: CPT

## 2023-03-15 PROCEDURE — 96374 THER/PROPH/DIAG INJ IV PUSH: CPT

## 2023-03-15 PROCEDURE — A4216 STERILE WATER/SALINE, 10 ML: HCPCS | Performed by: STUDENT IN AN ORGANIZED HEALTH CARE EDUCATION/TRAINING PROGRAM

## 2023-03-15 PROCEDURE — 6360000002 HC RX W HCPCS: Performed by: STUDENT IN AN ORGANIZED HEALTH CARE EDUCATION/TRAINING PROGRAM

## 2023-03-15 PROCEDURE — 2500000003 HC RX 250 WO HCPCS: Performed by: STUDENT IN AN ORGANIZED HEALTH CARE EDUCATION/TRAINING PROGRAM

## 2023-03-15 RX ORDER — METHYLPREDNISOLONE SODIUM SUCCINATE 125 MG/2ML
125 INJECTION, POWDER, LYOPHILIZED, FOR SOLUTION INTRAMUSCULAR; INTRAVENOUS ONCE
Status: COMPLETED | OUTPATIENT
Start: 2023-03-15 | End: 2023-03-15

## 2023-03-15 RX ORDER — DIPHENHYDRAMINE HYDROCHLORIDE 50 MG/ML
50 INJECTION INTRAMUSCULAR; INTRAVENOUS ONCE
Status: COMPLETED | OUTPATIENT
Start: 2023-03-15 | End: 2023-03-15

## 2023-03-15 RX ORDER — SODIUM CHLORIDE, SODIUM LACTATE, POTASSIUM CHLORIDE, AND CALCIUM CHLORIDE .6; .31; .03; .02 G/100ML; G/100ML; G/100ML; G/100ML
1000 INJECTION, SOLUTION INTRAVENOUS ONCE
Status: COMPLETED | OUTPATIENT
Start: 2023-03-15 | End: 2023-03-15

## 2023-03-15 RX ADMIN — FAMOTIDINE 20 MG: 10 INJECTION, SOLUTION INTRAVENOUS at 22:13

## 2023-03-15 RX ADMIN — SODIUM CHLORIDE, POTASSIUM CHLORIDE, SODIUM LACTATE AND CALCIUM CHLORIDE 1000 ML: 600; 310; 30; 20 INJECTION, SOLUTION INTRAVENOUS at 22:17

## 2023-03-15 RX ADMIN — METHYLPREDNISOLONE SODIUM SUCCINATE 125 MG: 125 INJECTION, POWDER, FOR SOLUTION INTRAMUSCULAR; INTRAVENOUS at 22:14

## 2023-03-15 RX ADMIN — DIPHENHYDRAMINE HYDROCHLORIDE 50 MG: 50 INJECTION, SOLUTION INTRAMUSCULAR; INTRAVENOUS at 22:14

## 2023-03-15 RX ADMIN — EPINEPHRINE 0.3 MG: 1 INJECTION INTRAMUSCULAR; INTRAVENOUS; SUBCUTANEOUS at 22:15

## 2023-03-15 ASSESSMENT — PAIN - FUNCTIONAL ASSESSMENT
PAIN_FUNCTIONAL_ASSESSMENT: NONE - DENIES PAIN
PAIN_FUNCTIONAL_ASSESSMENT: 0-10
PAIN_FUNCTIONAL_ASSESSMENT: NONE - DENIES PAIN
PAIN_FUNCTIONAL_ASSESSMENT: 0-10

## 2023-03-15 ASSESSMENT — PAIN SCALES - GENERAL
PAINLEVEL_OUTOF10: 7
PAINLEVEL_OUTOF10: 7

## 2023-03-15 ASSESSMENT — PAIN DESCRIPTION - LOCATION: LOCATION: OTHER (COMMENT)

## 2023-03-15 ASSESSMENT — PAIN DESCRIPTION - FREQUENCY: FREQUENCY: CONTINUOUS

## 2023-03-15 ASSESSMENT — PAIN DESCRIPTION - PAIN TYPE: TYPE: ACUTE PAIN

## 2023-03-15 ASSESSMENT — PAIN DESCRIPTION - ONSET: ONSET: ON-GOING

## 2023-03-16 VITALS
OXYGEN SATURATION: 97 % | DIASTOLIC BLOOD PRESSURE: 79 MMHG | RESPIRATION RATE: 18 BRPM | WEIGHT: 293 LBS | TEMPERATURE: 98.6 F | SYSTOLIC BLOOD PRESSURE: 121 MMHG | HEIGHT: 66 IN | HEART RATE: 76 BPM | BODY MASS INDEX: 47.09 KG/M2

## 2023-03-16 LAB
EKG ATRIAL RATE: 84 BPM
EKG P AXIS: 50 DEGREES
EKG P-R INTERVAL: 152 MS
EKG Q-T INTERVAL: 360 MS
EKG QRS DURATION: 82 MS
EKG QTC CALCULATION (BAZETT): 425 MS
EKG R AXIS: 11 DEGREES
EKG T AXIS: 24 DEGREES
EKG VENTRICULAR RATE: 84 BPM

## 2023-03-16 PROCEDURE — 93010 ELECTROCARDIOGRAM REPORT: CPT | Performed by: INTERNAL MEDICINE

## 2023-03-16 ASSESSMENT — PAIN - FUNCTIONAL ASSESSMENT: PAIN_FUNCTIONAL_ASSESSMENT: NONE - DENIES PAIN

## 2023-03-16 NOTE — ED TRIAGE NOTES
Pt presents to the ED with c/o a possible allergic reaction and shortness of breath. Pt states she noticed she had a cold sore and her coworker gave her acyclovir for it. Pt states she started to get bumps on her lips and on her arms and back. Pt states she also feels short of breath whenever she moves around. VSS. EKG complete.

## 2023-03-17 ENCOUNTER — HOSPITAL ENCOUNTER (EMERGENCY)
Age: 23
Discharge: HOME | End: 2023-03-17
Payer: COMMERCIAL

## 2023-03-17 VITALS
BODY MASS INDEX: 47.09 KG/M2 | DIASTOLIC BLOOD PRESSURE: 74 MMHG | OXYGEN SATURATION: 98 % | HEART RATE: 68 BPM | WEIGHT: 293 LBS | HEIGHT: 66 IN | TEMPERATURE: 97.6 F | RESPIRATION RATE: 16 BRPM | SYSTOLIC BLOOD PRESSURE: 116 MMHG

## 2023-03-17 DIAGNOSIS — S80.10XA TRAUMATIC ECCHYMOSIS OF LOWER LEG, UNSPECIFIED LATERALITY, INITIAL ENCOUNTER: Primary | ICD-10-CM

## 2023-03-17 PROCEDURE — 6370000000 HC RX 637 (ALT 250 FOR IP): Performed by: NURSE PRACTITIONER

## 2023-03-17 PROCEDURE — 99283 EMERGENCY DEPT VISIT LOW MDM: CPT

## 2023-03-17 RX ORDER — LIDOCAINE 4 G/G
2 PATCH TOPICAL DAILY
Status: DISCONTINUED | OUTPATIENT
Start: 2023-03-17 | End: 2023-03-17 | Stop reason: HOSPADM

## 2023-03-17 RX ORDER — FAMOTIDINE 20 MG/1
20 TABLET, FILM COATED ORAL ONCE
Status: COMPLETED | OUTPATIENT
Start: 2023-03-17 | End: 2023-03-17

## 2023-03-17 RX ADMIN — FAMOTIDINE 20 MG: 20 TABLET, FILM COATED ORAL at 13:51

## 2023-03-17 ASSESSMENT — PAIN - FUNCTIONAL ASSESSMENT: PAIN_FUNCTIONAL_ASSESSMENT: 0-10

## 2023-03-17 ASSESSMENT — PAIN SCALES - GENERAL: PAINLEVEL_OUTOF10: 8

## 2023-03-17 ASSESSMENT — PAIN DESCRIPTION - ORIENTATION: ORIENTATION: RIGHT;LEFT;LOWER

## 2023-03-17 ASSESSMENT — PAIN DESCRIPTION - LOCATION: LOCATION: LEG

## 2023-03-17 NOTE — ED PROVIDER NOTES
[place] [address]       Chief Complaint   Patient presents with    Other     Bruising on legs bilaterally. Pt concerned for blood clots       Nurses Notes reviewed and I agree except as noted in the HPI. HISTORY OF PRESENT ILLNESS    Tim Vuong is a 21 y.o. female who presents to the ED for evaluation of bruising x 1 week. She states her niece was choking so she jumped up out of a recliner and developed bruises on both legs in the proximal medial part of her shins/calves. This morning, she noticed that the center of her bruises was red and this alarmed her. She has pain in the area of her bruises and rates it at an 8/10. She describes the pain as constant, achy and sharp. She has tried tylenol which hasn't helped. Her mom sent her to the ED because she wants to make sure the red areas are not blood clots. Experienced previously: no    HPI was provided by the patient. PAST MEDICAL HISTORY     Past Medical History:   Diagnosis Date    Polycystic ovary disease        SURGICALHISTORY      has a past surgical history that includes Tonsillectomy and adenoidectomy; cyst removal (Left); and Cholecystectomy, laparoscopic (N/A, 1/30/2023). CURRENT MEDICATIONS       Discharge Medication List as of 3/17/2023  1:47 PM        CONTINUE these medications which have NOT CHANGED    Details   ibuprofen (ADVIL;MOTRIN) 600 MG tablet Take 1 tablet by mouth every 6 hours as needed for Pain, Disp-30 tablet, R-1OTC      acetaminophen (AMINOFEN) 325 MG tablet Take 2 tablets by mouth every 6 hours as needed for Pain, Disp-120 tablet, R-3OTC             ALLERGIES     is allergic to acyclovir. FAMILY HISTORY     She indicated that her mother is alive. She indicated that her father is alive. She indicated that the status of her sister is unknown. She indicated that the status of her maternal grandmother is unknown. She indicated that the status of her paternal grandmother is unknown. She indicated that the status of her paternal aunt is unknown.   family history includes Cancer in her paternal grandmother; Diabetes in her mother, paternal aunt, paternal grandmother, and sister; Thyroid Disease in her maternal grandmother. SOCIAL HISTORY       Social History     Socioeconomic History    Marital status: Single     Spouse name: Not on file    Number of children: Not on file    Years of education: Not on file    Highest education level: Not on file   Occupational History    Not on file   Tobacco Use    Smoking status: Never    Smokeless tobacco: Never   Vaping Use    Vaping Use: Never used   Substance and Sexual Activity    Alcohol use: No    Drug use: Yes     Types: Marijuana Dayna Melecio)     Comment: last use- July 2022    Sexual activity: Not on file   Other Topics Concern    Not on file   Social History Narrative    Not on file     Social Determinants of Health     Financial Resource Strain: Not on file   Food Insecurity: Not on file   Transportation Needs: Not on file   Physical Activity: Not on file   Stress: Not on file   Social Connections: Not on file   Intimate Partner Violence: Not on file   Housing Stability: Not on file       PHYSICAL EXAM     INITIAL VITALS:  height is 5' 6\" (1.676 m) and weight is 360 lb (163.3 kg) (abnormal). Her oral temperature is 97.6 °F (36.4 °C). Her blood pressure is 116/74 and her pulse is 68. Her respiration is 16 and oxygen saturation is 98%. Physical Exam  Vitals and nursing note reviewed. Constitutional:       General: She is not in acute distress. Appearance: Normal appearance. She is not ill-appearing or diaphoretic. HENT:      Head: Normocephalic and atraumatic. Eyes:      Pupils: Pupils are equal, round, and reactive to light. Cardiovascular:      Rate and Rhythm: Normal rate and regular rhythm. Pulses: Normal pulses. Heart sounds: Normal heart sounds. No murmur heard.   Pulmonary:      Effort: Pulmonary effort is normal. No respiratory distress. Breath sounds: Normal breath sounds. No wheezing or rhonchi. Musculoskeletal:         General: Tenderness and signs of injury present. No swelling or deformity. Normal range of motion. Right lower leg: Tenderness present. No swelling. No edema. Left lower leg: Tenderness present. No swelling. No edema. Comments: Mild tenderness to palpation of bruises. Negative Ady's sign. Skin:     General: Skin is warm and dry. Findings: Bruising present. No erythema, lesion or rash. Comments: 2 large bruises bilateral, located in the circled areas above in her calves. Neurological:      Mental Status: She is alert. Psychiatric:         Mood and Affect: Mood normal.         Behavior: Behavior normal.       DIFFERENTIAL DIAGNOSIS:   Ecchymosis, superficial venous thrombosis    DIAGNOSTIC RESULTS         RADIOLOGY: non-plainfilm images(s) such as CT, Ultrasound and MRI are read by the radiologist.  Plain radiographic images are visualized and preliminarily interpreted by the emergency physician unless otherwise stated below. No orders to display         LABS:   Labs Reviewed - No data to display    EMERGENCY DEPARTMENT COURSE:   Vitals:    Vitals:    03/17/23 1258   BP: 116/74   Pulse: 68   Resp: 16   Temp: 97.6 °F (36.4 °C)   TempSrc: Oral   SpO2: 98%   Weight: (!) 360 lb (163.3 kg)   Height: 5' 6\" (1.676 m)         MDM    Patient was seen and evaluated in the emergency department, patient appeared to be in no acute distress, vital signs reviewed, no significant findings are noted. Physical exam completed, she had bruises to bilateral calves that appear to be consistent with the patient's recent traumatic incident. There is no significant calf edema, there is no significant tenderness behind the knees or up into the thighs. There is no venous engorgement.   This did not appear to be consistent with a DVT, I did not feel ultrasound was needed at this time based on the patient's presentation. I discussed this with the patient she is agreeable with discharge. She is advised to use lidocaine patch, take Tylenol for pain. She is advised to return to the ER with worsening of symptoms. She verbalized understanding of plan of care. Medications   famotidine (PEPCID) tablet 20 mg (20 mg Oral Given 3/17/23 0114)       Patient was seenindependently by myself. The patient's final impression and disposition and plan was determined by myself. CRITICAL CARE:   None    CONSULTS:  None    PROCEDURES:  None    FINAL IMPRESSION     1. Traumatic ecchymosis of lower leg, unspecified laterality, initial encounter          DISPOSITION/PLAN   Patient discharged    PATIENT REFERREDTO:  Chico Flores, [facility]  [phone]    Go to   If symptoms worsen    DISCHARGE MEDICATIONS:  Discharge Medication List as of 3/17/2023  1:47 PM          (Please note that portions of this note were completed with a voice recognition program.  Efforts were made to edit the dictations but occasionally words are mis-transcribed.)  \    Provider:  I personally performed the services described in the documentation,reviewed and edited the documentation which was dictated to the scribe in my presence, and it accurately records my words and actions.     Richard Mejia CNP 03/17/23 6:37 PM    KAL Rodriguez - [place]         iTOK, KAL - CNP  03/17/23 1838

## 2023-03-17 NOTE — DISCHARGE INSTRUCTIONS
Use over the counter lidocaine patches, daily. Use famotidine twice daily to help with lip swelling.

## 2023-03-17 NOTE — ED TRIAGE NOTES
Pt to ED from home with concerns for bilateral blood clots. Pt states she went to help her niece last week and hit her legs trying to get out of the recliner. States she had \"red lumps\" on her legs that were painful and then went away. This RN looked at legs and pt appears to have bruising in various healing stages on lower extremities that are cool to the touch.  VSS

## 2023-04-05 ENCOUNTER — TRANSCRIBE ORDERS (OUTPATIENT)
Dept: ADMINISTRATIVE | Age: 23
End: 2023-04-05

## 2023-04-05 DIAGNOSIS — R10.32 LEFT LOWER QUADRANT PAIN: Primary | ICD-10-CM

## 2023-04-07 ENCOUNTER — HOSPITAL ENCOUNTER (OUTPATIENT)
Dept: ULTRASOUND IMAGING | Age: 23
Discharge: HOME | End: 2023-04-07
Payer: COMMERCIAL

## 2023-04-07 DIAGNOSIS — R10.32 LEFT LOWER QUADRANT PAIN: ICD-10-CM

## 2023-04-07 PROCEDURE — 76830 TRANSVAGINAL US NON-OB: CPT

## 2023-05-02 ENCOUNTER — HOSPITAL ENCOUNTER (OUTPATIENT)
Age: 23
Discharge: HOME | End: 2023-05-02
Payer: COMMERCIAL

## 2023-05-02 ENCOUNTER — HOSPITAL ENCOUNTER (OUTPATIENT)
Dept: GENERAL RADIOLOGY | Age: 23
Discharge: HOME | End: 2023-05-02
Payer: COMMERCIAL

## 2023-05-02 DIAGNOSIS — M54.50 LOW BACK PAIN, UNSPECIFIED BACK PAIN LATERALITY, UNSPECIFIED CHRONICITY, UNSPECIFIED WHETHER SCIATICA PRESENT: ICD-10-CM

## 2023-05-02 PROCEDURE — 72100 X-RAY EXAM L-S SPINE 2/3 VWS: CPT

## 2023-06-05 ENCOUNTER — HOSPITAL ENCOUNTER (EMERGENCY)
Age: 23
Discharge: HOME | End: 2023-06-05
Payer: COMMERCIAL

## 2023-06-05 ENCOUNTER — APPOINTMENT (OUTPATIENT)
Dept: GENERAL RADIOLOGY | Age: 23
End: 2023-06-05
Payer: COMMERCIAL

## 2023-06-05 VITALS
BODY MASS INDEX: 47.09 KG/M2 | WEIGHT: 293 LBS | TEMPERATURE: 98.4 F | HEART RATE: 100 BPM | HEIGHT: 66 IN | SYSTOLIC BLOOD PRESSURE: 152 MMHG | OXYGEN SATURATION: 100 % | RESPIRATION RATE: 18 BRPM | DIASTOLIC BLOOD PRESSURE: 97 MMHG

## 2023-06-05 DIAGNOSIS — J06.9 VIRAL URI WITH COUGH: Primary | ICD-10-CM

## 2023-06-05 LAB
FLUAV RNA RESP QL NAA+PROBE: NOT DETECTED
FLUBV RNA RESP QL NAA+PROBE: NOT DETECTED
S PYO AG THROAT QL: NEGATIVE
S PYO THROAT QL CULT: NORMAL
SARS-COV-2 RNA RESP QL NAA+PROBE: NOT DETECTED

## 2023-06-05 PROCEDURE — 87636 SARSCOV2 & INF A&B AMP PRB: CPT

## 2023-06-05 PROCEDURE — 99284 EMERGENCY DEPT VISIT MOD MDM: CPT

## 2023-06-05 PROCEDURE — 87070 CULTURE OTHR SPECIMN AEROBIC: CPT

## 2023-06-05 PROCEDURE — 71046 X-RAY EXAM CHEST 2 VIEWS: CPT

## 2023-06-05 PROCEDURE — 87880 STREP A ASSAY W/OPTIC: CPT

## 2023-06-05 RX ORDER — GUAIFENESIN AND PSEUDOEPHEDRINE HCL 1200; 120 MG/1; MG/1
1 TABLET, EXTENDED RELEASE ORAL 2 TIMES DAILY
Qty: 14 TABLET | Refills: 0 | Status: SHIPPED | OUTPATIENT
Start: 2023-06-05

## 2023-06-05 RX ORDER — FLUTICASONE PROPIONATE 50 MCG
1 SPRAY, SUSPENSION (ML) NASAL DAILY
Qty: 32 G | Refills: 1 | Status: SHIPPED | OUTPATIENT
Start: 2023-06-05

## 2023-06-05 ASSESSMENT — ENCOUNTER SYMPTOMS
VOMITING: 1
TROUBLE SWALLOWING: 0
SHORTNESS OF BREATH: 0
COUGH: 0
SINUS PRESSURE: 0
VOICE CHANGE: 1
NAUSEA: 1
EYE ITCHING: 0
RHINORRHEA: 0
EYE DISCHARGE: 0
ABDOMINAL PAIN: 0
DIARRHEA: 0
SORE THROAT: 0

## 2023-06-05 ASSESSMENT — PAIN - FUNCTIONAL ASSESSMENT: PAIN_FUNCTIONAL_ASSESSMENT: NONE - DENIES PAIN

## 2023-06-05 NOTE — ED TRIAGE NOTES
Pt presents to the ed with c/o cough, congestion. Pt states that she wants tested for covid, flu and strep.

## 2023-06-06 NOTE — ED PROVIDER NOTES
polyuria. Genitourinary:  Negative for decreased urine volume, dysuria and frequency. Musculoskeletal:  Negative for gait problem and myalgias. Skin:  Negative for rash. Neurological:  Negative for weakness, light-headedness and headaches. Hematological:  Negative for adenopathy. Psychiatric/Behavioral:  Negative for confusion and sleep disturbance. PAST MEDICAL HISTORY    has a past medical history of Polycystic ovary disease. SURGICAL HISTORY      has a past surgical history that includes Tonsillectomy and adenoidectomy; cyst removal (Left); and Cholecystectomy, laparoscopic (N/A, 1/30/2023). CURRENT MEDICATIONS       Discharge Medication List as of 6/5/2023  8:07 PM        CONTINUE these medications which have NOT CHANGED    Details   ibuprofen (ADVIL;MOTRIN) 600 MG tablet Take 1 tablet by mouth every 6 hours as needed for Pain, Disp-30 tablet, R-1OTC      acetaminophen (AMINOFEN) 325 MG tablet Take 2 tablets by mouth every 6 hours as needed for Pain, Disp-120 tablet, R-3OTC             ALLERGIES     is allergic to acyclovir. FAMILY HISTORY     She indicated that her mother is alive. She indicated that her father is alive. She indicated that the status of her sister is unknown. She indicated that the status of her maternal grandmother is unknown. She indicated that the status of her paternal grandmother is unknown. She indicated that the status of her paternal aunt is unknown.   family history includes Cancer in her paternal grandmother; Diabetes in her mother, paternal aunt, paternal grandmother, and sister; Thyroid Disease in her maternal grandmother. SOCIAL HISTORY    reports that she has never smoked. She has never used smokeless tobacco. She reports current drug use. Drug: Marijuana Jannette Forte). She reports that she does not drink alcohol. PHYSICAL EXAM     INITIAL VITALS:  height is 5' 6\" (1.676 m) and weight is 367 lb (166.5 kg) (abnormal).  Her oral temperature is 98.4 °F (36.9

## 2023-06-08 LAB — BACTERIA THROAT AEROBE CULT: NORMAL

## 2023-06-18 ENCOUNTER — HOSPITAL ENCOUNTER (EMERGENCY)
Age: 23
Discharge: HOME | End: 2023-06-18
Payer: COMMERCIAL

## 2023-06-18 ENCOUNTER — APPOINTMENT (OUTPATIENT)
Dept: GENERAL RADIOLOGY | Age: 23
End: 2023-06-18
Payer: COMMERCIAL

## 2023-06-18 VITALS
WEIGHT: 293 LBS | HEIGHT: 66 IN | HEART RATE: 73 BPM | TEMPERATURE: 97.3 F | RESPIRATION RATE: 18 BRPM | DIASTOLIC BLOOD PRESSURE: 82 MMHG | OXYGEN SATURATION: 96 % | SYSTOLIC BLOOD PRESSURE: 133 MMHG | BODY MASS INDEX: 47.09 KG/M2

## 2023-06-18 DIAGNOSIS — S93.602A SPRAIN OF LEFT FOOT, INITIAL ENCOUNTER: Primary | ICD-10-CM

## 2023-06-18 PROCEDURE — 73630 X-RAY EXAM OF FOOT: CPT

## 2023-06-18 PROCEDURE — 99283 EMERGENCY DEPT VISIT LOW MDM: CPT

## 2023-06-18 ASSESSMENT — PAIN DESCRIPTION - LOCATION: LOCATION: ANKLE

## 2023-06-18 ASSESSMENT — PAIN DESCRIPTION - ORIENTATION: ORIENTATION: LEFT

## 2023-06-18 ASSESSMENT — PAIN DESCRIPTION - FREQUENCY: FREQUENCY: CONTINUOUS

## 2023-06-18 ASSESSMENT — PAIN DESCRIPTION - PAIN TYPE: TYPE: ACUTE PAIN

## 2023-06-18 ASSESSMENT — PAIN DESCRIPTION - ONSET: ONSET: SUDDEN

## 2023-06-18 ASSESSMENT — PAIN DESCRIPTION - DESCRIPTORS: DESCRIPTORS: THROBBING

## 2023-06-18 ASSESSMENT — PAIN SCALES - GENERAL: PAINLEVEL_OUTOF10: 8

## 2023-06-20 ENCOUNTER — NURSE ONLY (OUTPATIENT)
Dept: LAB | Age: 23
End: 2023-06-20

## 2023-06-24 LAB
C TRACH RRNA SPEC QL NAA+PROBE: NEGATIVE
N GONORRHOEA RRNA SPEC QL NAA+PROBE: NEGATIVE
SPEC CONTAINER SPEC: NORMAL
SPECIMEN SOURCE: NORMAL
SPECIMEN SOURCE: NORMAL
T VAGINALIS RRNA SPEC QL NAA+PROBE: NEGATIVE

## 2023-09-01 ENCOUNTER — APPOINTMENT (OUTPATIENT)
Dept: GENERAL RADIOLOGY | Age: 23
End: 2023-09-01
Payer: COMMERCIAL

## 2023-09-01 ENCOUNTER — HOSPITAL ENCOUNTER (EMERGENCY)
Age: 23
Discharge: HOME | End: 2023-09-02
Attending: EMERGENCY MEDICINE
Payer: COMMERCIAL

## 2023-09-01 VITALS
BODY MASS INDEX: 47.09 KG/M2 | DIASTOLIC BLOOD PRESSURE: 78 MMHG | TEMPERATURE: 98.2 F | HEIGHT: 66 IN | SYSTOLIC BLOOD PRESSURE: 147 MMHG | WEIGHT: 293 LBS | RESPIRATION RATE: 14 BRPM | HEART RATE: 66 BPM | OXYGEN SATURATION: 100 %

## 2023-09-01 DIAGNOSIS — N39.0 URINARY TRACT INFECTION WITHOUT HEMATURIA, SITE UNSPECIFIED: ICD-10-CM

## 2023-09-01 DIAGNOSIS — R42 LIGHTHEADEDNESS: Primary | ICD-10-CM

## 2023-09-01 LAB
ALBUMIN SERPL BCG-MCNC: 3.5 G/DL (ref 3.5–5.1)
ALP SERPL-CCNC: 39 U/L (ref 38–126)
ALT SERPL W/O P-5'-P-CCNC: 15 U/L (ref 11–66)
ANION GAP SERPL CALC-SCNC: 8 MEQ/L (ref 8–16)
AST SERPL-CCNC: 14 U/L (ref 5–40)
B-HCG SERPL QL: NEGATIVE
BACTERIA URNS QL MICRO: ABNORMAL /HPF
BASOPHILS ABSOLUTE: 0 THOU/MM3 (ref 0–0.1)
BASOPHILS NFR BLD AUTO: 0.4 %
BILIRUB SERPL-MCNC: 0.2 MG/DL (ref 0.3–1.2)
BILIRUB UR QL STRIP.AUTO: NEGATIVE
BUN SERPL-MCNC: 13 MG/DL (ref 7–22)
CALCIUM SERPL-MCNC: 8.5 MG/DL (ref 8.5–10.5)
CASTS #/AREA URNS LPF: ABNORMAL /LPF
CASTS 2: ABNORMAL /LPF
CHARACTER UR: CLEAR
CHLORIDE SERPL-SCNC: 106 MEQ/L (ref 98–111)
CO2 SERPL-SCNC: 24 MEQ/L (ref 23–33)
COLOR: YELLOW
CREAT SERPL-MCNC: 0.8 MG/DL (ref 0.4–1.2)
CRYSTALS URNS MICRO: ABNORMAL
DEPRECATED RDW RBC AUTO: 43.5 FL (ref 35–45)
EOSINOPHIL NFR BLD AUTO: 2.6 %
EOSINOPHILS ABSOLUTE: 0.3 THOU/MM3 (ref 0–0.4)
EPITHELIAL CELLS, UA: ABNORMAL /HPF
ERYTHROCYTE [DISTWIDTH] IN BLOOD BY AUTOMATED COUNT: 12.7 % (ref 11.5–14.5)
GFR SERPL CREATININE-BSD FRML MDRD: > 60 ML/MIN/1.73M2
GLUCOSE SERPL-MCNC: 94 MG/DL (ref 70–108)
GLUCOSE UR QL STRIP.AUTO: NEGATIVE MG/DL
HCT VFR BLD AUTO: 40.6 % (ref 37–47)
HGB BLD-MCNC: 12.7 GM/DL (ref 12–16)
HGB UR QL STRIP.AUTO: NEGATIVE
IMM GRANULOCYTES # BLD AUTO: 0.03 THOU/MM3 (ref 0–0.07)
IMM GRANULOCYTES NFR BLD AUTO: 0.3 %
KETONES UR QL STRIP.AUTO: NEGATIVE
LYMPHOCYTES ABSOLUTE: 4 THOU/MM3 (ref 1–4.8)
LYMPHOCYTES NFR BLD AUTO: 34.8 %
MAGNESIUM SERPL-MCNC: 2.1 MG/DL (ref 1.6–2.4)
MCH RBC QN AUTO: 29.1 PG (ref 26–33)
MCHC RBC AUTO-ENTMCNC: 31.3 GM/DL (ref 32.2–35.5)
MCV RBC AUTO: 93.1 FL (ref 81–99)
MISCELLANEOUS 2: ABNORMAL
MONOCYTES ABSOLUTE: 0.9 THOU/MM3 (ref 0.4–1.3)
MONOCYTES NFR BLD AUTO: 7.7 %
NEUTROPHILS NFR BLD AUTO: 54.2 %
NITRITE UR QL STRIP: POSITIVE
NRBC BLD AUTO-RTO: 0 /100 WBC
OSMOLALITY SERPL CALC.SUM OF ELEC: 275.5 MOSMOL/KG (ref 275–300)
PH UR STRIP.AUTO: 5.5 [PH] (ref 5–9)
PLATELET # BLD AUTO: 324 THOU/MM3 (ref 130–400)
PMV BLD AUTO: 9.9 FL (ref 9.4–12.4)
POTASSIUM SERPL-SCNC: 4 MEQ/L (ref 3.5–5.2)
PROT SERPL-MCNC: 5.8 G/DL (ref 6.1–8)
PROT UR STRIP.AUTO-MCNC: NEGATIVE MG/DL
RBC # BLD AUTO: 4.36 MILL/MM3 (ref 4.2–5.4)
RBC URINE: ABNORMAL /HPF
RENAL EPI CELLS #/AREA URNS HPF: ABNORMAL /[HPF]
SEGMENTED NEUTROPHILS ABSOLUTE COUNT: 6.2 THOU/MM3 (ref 1.8–7.7)
SODIUM SERPL-SCNC: 138 MEQ/L (ref 135–145)
SP GR UR REFRACT.AUTO: 1.03 (ref 1–1.03)
UROBILINOGEN, URINE: 0.2 EU/DL (ref 0–1)
WBC # BLD AUTO: 11.4 THOU/MM3 (ref 4.8–10.8)
WBC #/AREA URNS HPF: ABNORMAL /HPF
WBC #/AREA URNS HPF: NEGATIVE /[HPF]
YEAST LIKE FUNGI URNS QL MICRO: ABNORMAL

## 2023-09-01 PROCEDURE — 85025 COMPLETE CBC W/AUTO DIFF WBC: CPT

## 2023-09-01 PROCEDURE — 93005 ELECTROCARDIOGRAM TRACING: CPT | Performed by: EMERGENCY MEDICINE

## 2023-09-01 PROCEDURE — 84703 CHORIONIC GONADOTROPIN ASSAY: CPT

## 2023-09-01 PROCEDURE — 81001 URINALYSIS AUTO W/SCOPE: CPT

## 2023-09-01 PROCEDURE — 83735 ASSAY OF MAGNESIUM: CPT

## 2023-09-01 PROCEDURE — 71045 X-RAY EXAM CHEST 1 VIEW: CPT

## 2023-09-01 PROCEDURE — 36415 COLL VENOUS BLD VENIPUNCTURE: CPT

## 2023-09-01 PROCEDURE — 80053 COMPREHEN METABOLIC PANEL: CPT

## 2023-09-01 PROCEDURE — 99285 EMERGENCY DEPT VISIT HI MDM: CPT

## 2023-09-01 ASSESSMENT — PAIN - FUNCTIONAL ASSESSMENT: PAIN_FUNCTIONAL_ASSESSMENT: NONE - DENIES PAIN

## 2023-09-02 LAB
EKG ATRIAL RATE: 73 BPM
EKG P AXIS: 60 DEGREES
EKG P-R INTERVAL: 142 MS
EKG Q-T INTERVAL: 374 MS
EKG QRS DURATION: 80 MS
EKG QTC CALCULATION (BAZETT): 412 MS
EKG R AXIS: 15 DEGREES
EKG T AXIS: 19 DEGREES
EKG VENTRICULAR RATE: 73 BPM

## 2023-09-02 PROCEDURE — 93010 ELECTROCARDIOGRAM REPORT: CPT | Performed by: NUCLEAR MEDICINE

## 2023-09-02 PROCEDURE — 6370000000 HC RX 637 (ALT 250 FOR IP): Performed by: EMERGENCY MEDICINE

## 2023-09-02 RX ORDER — 0.9 % SODIUM CHLORIDE 0.9 %
1000 INTRAVENOUS SOLUTION INTRAVENOUS ONCE
Status: DISCONTINUED | OUTPATIENT
Start: 2023-09-02 | End: 2023-09-02

## 2023-09-02 RX ORDER — CEPHALEXIN 250 MG/1
500 CAPSULE ORAL ONCE
Status: COMPLETED | OUTPATIENT
Start: 2023-09-02 | End: 2023-09-02

## 2023-09-02 RX ORDER — CEPHALEXIN 500 MG/1
500 CAPSULE ORAL 4 TIMES DAILY
Qty: 28 CAPSULE | Refills: 0 | Status: SHIPPED | OUTPATIENT
Start: 2023-09-02 | End: 2023-09-09

## 2023-09-02 RX ADMIN — CEPHALEXIN 500 MG: 250 CAPSULE ORAL at 00:58

## 2023-09-02 NOTE — DISCHARGE INSTRUCTIONS
Please take the entire course of antibiotics as directed by the pharmacist.  If your symptoms worsen, please return the emergency department.   Otherwise, please follow-up with your primary doctor

## 2023-09-02 NOTE — ED TRIAGE NOTES
Patient presents to ED via lobby after \"not feeling like herself. \" Pt states this began at 1630 when she was at work. Pt states she has \"little chills throughout whole body\", shaky and dizzy.

## 2023-09-02 NOTE — ED NOTES
Orthos completed at this time. Pt refused covid/flu testing at this time stating \"I aint that sick\". Pt in stable condition.       Flaco Larson RN  09/01/23 7210

## 2023-09-02 NOTE — ED PROVIDER NOTES
[facility] ENCOUNTER          Pt Name: Gracy Martínez  MRN: [id number]  [address] [facility] [place] [address]  Date of evaluation: 9/1/2023  Treating Resident Physician: Ester Osorio MD  Supervising Physician: Dr. Silas Tate    History obtained from the patient. CHIEF COMPLAINT       Chief Complaint   Patient presents with    Dizziness     \"Doesn't feel like herself. \"           HISTORY OF PRESENT ILLNESS    HPI  Gracy Martínez is a 21 y.o. female who presents to the emergency department for evaluation of lightheadedness    Patient states that she does not feel dizzy, she feels like her entire body is \"shaking. \"  Patient was asked to hold her arms out in front of her, no tremor visible. Patient states that she has a sensation like her entire chest is shaking as we are speaking. She also feels some tingling in her lower extremities, lightheadedness upon standing, just an overall feeling of \"being off and feeling weird\"  Last menstrual period August 8, chance of being pregnant, no nausea or vomiting, no headache at this time. Afebrile and no sick contacts. The patient has no other acute complaints at this time. REVIEW OF SYSTEMS   Review of Systems      PAST MEDICAL AND SURGICAL HISTORY     Past Medical History:   Diagnosis Date    Polycystic ovary disease      Past Surgical History:   Procedure Laterality Date    CHOLECYSTECTOMY, LAPAROSCOPIC N/A 1/30/2023    Laparoscopic Cholecystectomy performed by Jorgito Tim MD at [facility] Left     wrist    TONSILLECTOMY AND ADENOIDECTOMY           MEDICATIONS   No current facility-administered medications for this encounter.     Current Outpatient Medications:     cephALEXin (KEFLEX) 500 MG capsule, Take 1 capsule by mouth 4 times daily for 7 days, Disp: 28 capsule, Rfl: 0    etodolac (LODINE) 300 MG capsule, Take 1 capsule by mouth in the morning and 1 capsule at noon and 1 capsule in the evening.,

## 2023-09-02 NOTE — ED NOTES
Received bedside report from [facility], no distress noted as pt breaths easy and unlabored. Call light left within reach.        [place], [place]  09/01/23 4584

## 2023-10-24 ENCOUNTER — HOSPITAL ENCOUNTER (OUTPATIENT)
Dept: MRI IMAGING | Age: 23
Discharge: HOME | End: 2023-10-24
Payer: COMMERCIAL

## 2023-10-24 DIAGNOSIS — M47.816 LUMBAR SPONDYLOSIS: ICD-10-CM

## 2023-10-24 DIAGNOSIS — M54.59 WEIGHT LIFTER'S BACK: ICD-10-CM

## 2023-10-24 PROCEDURE — 72148 MRI LUMBAR SPINE W/O DYE: CPT

## 2023-12-11 ENCOUNTER — HOSPITAL ENCOUNTER (EMERGENCY)
Age: 23
Discharge: HOME | End: 2023-12-11
Payer: COMMERCIAL

## 2023-12-11 VITALS
BODY MASS INDEX: 47.09 KG/M2 | DIASTOLIC BLOOD PRESSURE: 92 MMHG | HEIGHT: 66 IN | WEIGHT: 293 LBS | TEMPERATURE: 98 F | OXYGEN SATURATION: 100 % | HEART RATE: 73 BPM | RESPIRATION RATE: 18 BRPM | SYSTOLIC BLOOD PRESSURE: 137 MMHG

## 2023-12-11 DIAGNOSIS — S09.90XA MINOR HEAD INJURY, INITIAL ENCOUNTER: Primary | ICD-10-CM

## 2023-12-11 DIAGNOSIS — S00.03XA CONTUSION OF SCALP, INITIAL ENCOUNTER: ICD-10-CM

## 2023-12-11 DIAGNOSIS — S06.0X0A CONCUSSION WITHOUT LOSS OF CONSCIOUSNESS, INITIAL ENCOUNTER: ICD-10-CM

## 2023-12-11 PROCEDURE — 99282 EMERGENCY DEPT VISIT SF MDM: CPT

## 2023-12-11 ASSESSMENT — PAIN - FUNCTIONAL ASSESSMENT: PAIN_FUNCTIONAL_ASSESSMENT: NONE - DENIES PAIN

## 2023-12-12 NOTE — ED PROVIDER NOTES
[facility] EMERGENCY DEPT      EMERGENCY MEDICINE     Pt Name: Estela Chirinos  MRN: [id number]  [address] [facility] [address]  Date of evaluation: 12/11/2023  Provider: KAL Hayes CNP    CHIEF COMPLAINT       Chief Complaint   Patient presents with    Head Injury     HISTORY OF PRESENT ILLNESS   Estela Chirinos is a pleasant 21 y.o. female who presents to the emergency department from home with c/o dizziness. 80-year-old female presenting to the ER for an head injury. Patient states that at 2:30 PM today she hit the front of her head while lowering her trunk door. Denies syncope, nausea/vomiting, blood from head injury. Patient complains of burning/throbbing pain to the superior right frontal bone and in the neck that lasted a few minutes. Now she denies any pain to the head and neck. Patient denies taking any medications or ice for the pain. She denies being on anticoagulants. She reports she felt \"sleepy, feet heavy\" after the injury and symptoms has been consistent till now. Patient stated she felt dizzy and describes it as \"room spinning\" and stated this is never happened before. History is obtained from:  patient  PASTMEDICAL HISTORY     Past Medical History:   Diagnosis Date    Polycystic ovary disease        Patient Active Problem List   Diagnosis Code    Obesity E66.9    Pre-diabetes R73.03    Bleeding R58    Vaginal spotting N93.9    Fall at home, initial encounter [facility] Nael Guy. Charmayne Post, Y92.009    Pregnancy complication, antepartum O26.90    Pregnancy complication before birth O99.891    Vaginal delivery O80    Acute cholecystitis K81.0     SURGICAL HISTORY       Past Surgical History:   Procedure Laterality Date    CHOLECYSTECTOMY, LAPAROSCOPIC N/A 1/30/2023    Laparoscopic Cholecystectomy performed by Anjel Shrestha MD at [facility] Left     wrist    [facility]       Discharge Medication List as of 12/11/2023  9:56 PM

## 2023-12-21 NOTE — H&P
returned to baseline status. Respiratory therapy will be on standby during the procedure. [x]Pre-procedure diagnostic studies complete and results available. Comment:    [x]Previous sedation/anesthesia experiences assessed. Comment:    [x]The patient is an appropriate candidate to undergo the planned procedure sedation and anesthesia. (Refer to nursing sedation/analgesia documentation record)  [x]Formulation and discussion of sedation/procedure plan, risks, and expectations with patient and/or responsible adult completed. [x]Patient examined immediately prior to the procedure.  (Refer to nursing sedation/analgesia documentation record)    Raven Ward MD   Electronically signed 12/26/2023 at 7:51 AM

## 2023-12-21 NOTE — PROCEDURES
sheet for vitals)            Post-Sedation Exam: Lungs: clear to auscultation bilaterally and Cardiovascular: regular rate and rhythm           Complications: none

## 2023-12-26 ENCOUNTER — ANESTHESIA EVENT (OUTPATIENT)
Dept: ENDOSCOPY | Age: 23
End: 2023-12-26
Payer: COMMERCIAL

## 2023-12-26 ENCOUNTER — HOSPITAL ENCOUNTER (OUTPATIENT)
Age: 23
Setting detail: OUTPATIENT SURGERY
Discharge: HOME | End: 2023-12-26
Attending: INTERNAL MEDICINE | Admitting: INTERNAL MEDICINE
Payer: COMMERCIAL

## 2023-12-26 ENCOUNTER — ANESTHESIA (OUTPATIENT)
Dept: ENDOSCOPY | Age: 23
End: 2023-12-26
Payer: COMMERCIAL

## 2023-12-26 VITALS
HEART RATE: 53 BPM | RESPIRATION RATE: 16 BRPM | TEMPERATURE: 97.1 F | OXYGEN SATURATION: 99 % | SYSTOLIC BLOOD PRESSURE: 119 MMHG | WEIGHT: 293 LBS | DIASTOLIC BLOOD PRESSURE: 71 MMHG | HEIGHT: 66 IN | BODY MASS INDEX: 47.09 KG/M2

## 2023-12-26 LAB — PREGNANCY, URINE: NEGATIVE

## 2023-12-26 PROCEDURE — 7100000011 HC PHASE II RECOVERY - ADDTL 15 MIN: Performed by: INTERNAL MEDICINE

## 2023-12-26 PROCEDURE — 3700000001 HC ADD 15 MINUTES (ANESTHESIA): Performed by: INTERNAL MEDICINE

## 2023-12-26 PROCEDURE — 81025 URINE PREGNANCY TEST: CPT

## 2023-12-26 PROCEDURE — 3609010300 HC COLONOSCOPY W/BIOPSY SINGLE/MULTIPLE: Performed by: INTERNAL MEDICINE

## 2023-12-26 PROCEDURE — 88305 TISSUE EXAM BY PATHOLOGIST: CPT

## 2023-12-26 PROCEDURE — 2580000003 HC RX 258: Performed by: INTERNAL MEDICINE

## 2023-12-26 PROCEDURE — 6360000002 HC RX W HCPCS: Performed by: REGISTERED NURSE

## 2023-12-26 PROCEDURE — 7100000010 HC PHASE II RECOVERY - FIRST 15 MIN: Performed by: INTERNAL MEDICINE

## 2023-12-26 PROCEDURE — 2709999900 HC NON-CHARGEABLE SUPPLY: Performed by: INTERNAL MEDICINE

## 2023-12-26 PROCEDURE — 3700000000 HC ANESTHESIA ATTENDED CARE: Performed by: INTERNAL MEDICINE

## 2023-12-26 RX ORDER — SODIUM CHLORIDE 0.9 % (FLUSH) 0.9 %
5-40 SYRINGE (ML) INJECTION EVERY 12 HOURS SCHEDULED
Status: DISCONTINUED | OUTPATIENT
Start: 2023-12-26 | End: 2023-12-26 | Stop reason: HOSPADM

## 2023-12-26 RX ORDER — CYCLOBENZAPRINE HCL 10 MG
10 TABLET ORAL 3 TIMES DAILY PRN
COMMUNITY

## 2023-12-26 RX ORDER — IBUPROFEN 800 MG/1
800 TABLET ORAL EVERY 6 HOURS PRN
COMMUNITY

## 2023-12-26 RX ORDER — FAMOTIDINE 10 MG
10 TABLET ORAL 2 TIMES DAILY
COMMUNITY

## 2023-12-26 RX ORDER — SODIUM CHLORIDE 0.9 % (FLUSH) 0.9 %
5-40 SYRINGE (ML) INJECTION PRN
Status: DISCONTINUED | OUTPATIENT
Start: 2023-12-26 | End: 2023-12-26 | Stop reason: HOSPADM

## 2023-12-26 RX ORDER — SODIUM CHLORIDE, SODIUM LACTATE, POTASSIUM CHLORIDE, CALCIUM CHLORIDE 600; 310; 30; 20 MG/100ML; MG/100ML; MG/100ML; MG/100ML
INJECTION, SOLUTION INTRAVENOUS CONTINUOUS
Status: DISCONTINUED | OUTPATIENT
Start: 2023-12-26 | End: 2023-12-26 | Stop reason: HOSPADM

## 2023-12-26 RX ORDER — SODIUM CHLORIDE 9 MG/ML
25 INJECTION, SOLUTION INTRAVENOUS PRN
Status: DISCONTINUED | OUTPATIENT
Start: 2023-12-26 | End: 2023-12-26 | Stop reason: HOSPADM

## 2023-12-26 RX ORDER — PROPOFOL 10 MG/ML
INJECTION, EMULSION INTRAVENOUS PRN
Status: DISCONTINUED | OUTPATIENT
Start: 2023-12-26 | End: 2023-12-26 | Stop reason: SDUPTHER

## 2023-12-26 RX ADMIN — SODIUM CHLORIDE, POTASSIUM CHLORIDE, SODIUM LACTATE AND CALCIUM CHLORIDE: 600; 310; 30; 20 INJECTION, SOLUTION INTRAVENOUS at 07:47

## 2023-12-26 RX ADMIN — PROPOFOL 350 MG: 10 INJECTION, EMULSION INTRAVENOUS at 07:51

## 2023-12-26 NOTE — ANESTHESIA PRE PROCEDURE
Department of Anesthesiology  Preprocedure Note       Name:  Stanislav Charles   Age:  21 y.o.  :  2000                                          MRN:  [id number]         Date:  2023      Surgeon: Chary Xiao):  Marianne Vargas MD    Procedure: Procedure(s):  COLONOSCOPY    Medications prior to admission:   Prior to Admission medications    Medication Sig Start Date End Date Taking? Authorizing Provider   etodolac (LODINE) 300 MG capsule Take 1 capsule by mouth in the morning and 1 capsule at noon and 1 capsule in the evening. 23   Shruthi Spence PA   fluticasone (FLONASE) 50 MCG/ACT nasal spray 1 spray by Each Nostril route daily 23   Omid Mike PA-C   Prenatal MV-Min-Fe Fum-FA-DHA (PRENATAL 1 PO) Take by mouth  22  Provider, MD Alma       Current medications:    Current Facility-Administered Medications   Medication Dose Route Frequency Provider Last Rate Last Admin    sodium chloride flush 0.9 % injection 5-40 mL  5-40 mL IntraVENous 2 times per day Marianne Vargas MD        sodium chloride flush 0.9 % injection 5-40 mL  5-40 mL IntraVENous PRN Marianne Vargas MD        0.9 % sodium chloride infusion  25 mL IntraVENous PRN Marianne Vargas MD        lactated ringers IV soln infusion   IntraVENous Continuous Marianne Vargas MD           Allergies: Allergies   Allergen Reactions    Acyclovir Hives       Problem List:    Patient Active Problem List   Diagnosis Code    Obesity E66.9    Pre-diabetes R73.03    Bleeding R58    Vaginal spotting N93.9    Fall at home, initial encounter W19. Lenn Grave, Y92.009    Pregnancy complication, antepartum O26.90    Pregnancy complication before birth O99.891    Vaginal delivery O80    Acute cholecystitis K81.0       Past Medical History:        Diagnosis Date    Polycystic ovary disease        Past Surgical History:        Procedure Laterality Date    CHOLECYSTECTOMY, LAPAROSCOPIC N/A 2023    Laparoscopic Cholecystectomy

## 2023-12-26 NOTE — DISCHARGE INSTRUCTIONS
IMPRESSION:   No polyps or masses. Grade 2 Internal Hemorrhoids. Edematous mucosa noted in the ascending and transverse colon, cold biopsies taken. RECOMMENDATIONS:    Resume regular diet and meds. Take meds for constipation as prescribed. Follow up with GYN regarding pelvic pain, not GI. Follow up appt. With RODOLFO Doty CNP in 3 months.

## 2023-12-26 NOTE — ANESTHESIA POSTPROCEDURE EVALUATION
Department of Anesthesiology  Postprocedure Note    Patient: Dale Melendez  MRN: [id number]  YOB: 2000  Date of evaluation: 12/26/2023    Procedure Summary       Date: 12/26/23 Room / Location: [address] [facility] [address] / [address] [place] [address]    Anesthesia Start: 2342 Anesthesia Stop: 4379    Procedure: COLONOSCOPY WITH BIOPSY Diagnosis:       Constipation, unspecified constipation type      Pelvic pain      (Constipation, unspecified constipation type [K59.00])      (Pelvic pain [R10.2])    Surgeons: Domingo Woods MD Responsible Provider: Junior Ratliff MD    Anesthesia Type: MAC ASA Status: 3            Anesthesia Type: No value filed. Prudencio Phase I: Prudencio Score: 10    Prudencio Phase II:      Anesthesia Post Evaluation    Patient location during evaluation: bedside  Patient participation: complete - patient participated  Level of consciousness: awake and alert  Airway patency: patent  Nausea & Vomiting: no nausea and no vomiting  Cardiovascular status: hemodynamically stable and blood pressure returned to baseline  Respiratory status: acceptable, spontaneous ventilation, nonlabored ventilation and room air  Hydration status: stable  Pain management: adequate        No notable events documented.

## 2023-12-26 NOTE — PROGRESS NOTES
Scope # . Colonoscopy completed, tolerated well. Biopsies taken , 2 jars labeled and sent to lab for processing. Photos taken.

## 2023-12-26 NOTE — PROGRESS NOTES
Arnol Robert admitted to [facility] for colonoscopy with Dr. Payam Sorensen. Consent form signed and verified with pt.

## 2024-01-21 ENCOUNTER — HOSPITAL ENCOUNTER (EMERGENCY)
Age: 24
Discharge: HOME | End: 2024-01-21
Payer: COMMERCIAL

## 2024-01-21 VITALS
TEMPERATURE: 97.7 F | OXYGEN SATURATION: 98 % | RESPIRATION RATE: 16 BRPM | HEART RATE: 82 BPM | DIASTOLIC BLOOD PRESSURE: 88 MMHG | SYSTOLIC BLOOD PRESSURE: 152 MMHG | BODY MASS INDEX: 59.72 KG/M2 | WEIGHT: 293 LBS

## 2024-01-21 DIAGNOSIS — T50.905A MEDICATION REACTION, INITIAL ENCOUNTER: Primary | ICD-10-CM

## 2024-01-21 DIAGNOSIS — B00.1 HERPES LABIALIS: ICD-10-CM

## 2024-01-21 PROCEDURE — 6370000000 HC RX 637 (ALT 250 FOR IP): Performed by: PHYSICIAN ASSISTANT

## 2024-01-21 PROCEDURE — 99283 EMERGENCY DEPT VISIT LOW MDM: CPT

## 2024-01-21 RX ORDER — PREDNISONE 20 MG/1
60 TABLET ORAL ONCE
Status: COMPLETED | OUTPATIENT
Start: 2024-01-21 | End: 2024-01-21

## 2024-01-21 RX ORDER — PREDNISONE 20 MG/1
TABLET ORAL
Qty: 15 TABLET | Refills: 0 | Status: SHIPPED | OUTPATIENT
Start: 2024-01-21

## 2024-01-21 RX ADMIN — PREDNISONE 60 MG: 20 TABLET ORAL at 14:03

## 2024-01-21 NOTE — ED TRIAGE NOTES
Pt to ED with lip swelling. Pt states that she went to [facility] recently for cold sores on her lips. Was prescribed valacyclovir. Pt states that the cold sores are not getting better and she now has sores in her mouth.

## 2024-01-21 NOTE — ED PROVIDER NOTES
[facility] EMERGENCY DEPT      EMERGENCY MEDICINE     Pt Name: Carl Fuchs  MRN: [id number]  Birthdate 2000  Date of evaluation: 1/21/2024  Provider: RIKA Islas    CHIEF COMPLAINT       Chief Complaint   Patient presents with    Oral Swelling     HISTORY OF PRESENT ILLNESS   Carl Fuchs is a pleasant 24 y.o. female who presents to the emergency department from from home, by private vehicle for evaluation lip swelling.  The patient has taken acyclovir in the past which is caused her lips to swell.  She went to urgent care this week and got valacyclovir for herpes labialis.  The patient started taking valacyclovir on Tuesday and noticed that she has swelling in her lips.  She had similar reactions with Abreva.  She is concerned about the lip swelling.  She had no difficulty swallowing or speaking.  She has no shortness of breath.  No rashes.        PASTMEDICAL HISTORY     Past Medical History:   Diagnosis Date    Polycystic ovary disease        Patient Active Problem List   Diagnosis Code    Obesity E66.9    Pre-diabetes R73.03    Bleeding R58    Vaginal spotting N93.9    Fall at home, initial encounter W19.XXXA, Y92.009    Pregnancy complication, antepartum O26.90    Pregnancy complication before birth O99.891    Vaginal delivery O80    Acute cholecystitis K81.0     SURGICAL HISTORY       Past Surgical History:   Procedure Laterality Date    CHOLECYSTECTOMY, LAPAROSCOPIC N/A 1/30/2023    Laparoscopic Cholecystectomy performed by Harris Jacques MD at [facility] [place] OR    COLONOSCOPY N/A 12/26/2023    COLONOSCOPY WITH BIOPSY performed by Marci Christiansen MD at [facility] [place] ENDOSCOPY    CYST REMOVAL Left     wrist    TONSILLECTOMY AND ADENOIDECTOMY         CURRENT MEDICATIONS       Discharge Medication List as of 1/21/2024  2:57 PM        CONTINUE these medications which have NOT CHANGED    Details   ibuprofen (ADVIL;MOTRIN) 800 MG tablet Take 1 tablet by mouth every 6 hours as needed for PainHistorical Med

## 2024-01-22 ENCOUNTER — HOSPITAL ENCOUNTER (EMERGENCY)
Age: 24
Discharge: HOME | End: 2024-01-22
Payer: COMMERCIAL

## 2024-01-22 VITALS
BODY MASS INDEX: 47.09 KG/M2 | TEMPERATURE: 97.9 F | OXYGEN SATURATION: 97 % | RESPIRATION RATE: 16 BRPM | DIASTOLIC BLOOD PRESSURE: 97 MMHG | SYSTOLIC BLOOD PRESSURE: 165 MMHG | WEIGHT: 293 LBS | HEART RATE: 77 BPM | HEIGHT: 66 IN

## 2024-01-22 DIAGNOSIS — T78.40XA ALLERGIC REACTION, INITIAL ENCOUNTER: Primary | ICD-10-CM

## 2024-01-22 DIAGNOSIS — R22.0 LIP SWELLING: ICD-10-CM

## 2024-01-22 PROCEDURE — 99283 EMERGENCY DEPT VISIT LOW MDM: CPT

## 2024-01-22 PROCEDURE — 6370000000 HC RX 637 (ALT 250 FOR IP): Performed by: NURSE PRACTITIONER

## 2024-01-22 RX ORDER — DIPHENHYDRAMINE HCL 25 MG
50 CAPSULE ORAL EVERY 6 HOURS
Qty: 56 CAPSULE | Refills: 0 | Status: SHIPPED | OUTPATIENT
Start: 2024-01-22 | End: 2024-01-29

## 2024-01-22 RX ORDER — FAMOTIDINE 20 MG/1
20 TABLET, FILM COATED ORAL 2 TIMES DAILY
Qty: 60 TABLET | Refills: 0 | Status: SHIPPED | OUTPATIENT
Start: 2024-01-22

## 2024-01-22 RX ORDER — DIPHENHYDRAMINE HCL 25 MG
50 TABLET ORAL ONCE
Status: COMPLETED | OUTPATIENT
Start: 2024-01-22 | End: 2024-01-22

## 2024-01-22 RX ORDER — FAMOTIDINE 20 MG/1
20 TABLET, FILM COATED ORAL ONCE
Status: COMPLETED | OUTPATIENT
Start: 2024-01-22 | End: 2024-01-22

## 2024-01-22 RX ADMIN — FAMOTIDINE 20 MG: 20 TABLET ORAL at 21:55

## 2024-01-22 RX ADMIN — DIPHENHYDRAMINE HYDROCHLORIDE 50 MG: 25 TABLET ORAL at 21:55

## 2024-01-22 ASSESSMENT — PAIN SCALES - GENERAL: PAINLEVEL_OUTOF10: 9

## 2024-01-22 ASSESSMENT — PAIN - FUNCTIONAL ASSESSMENT: PAIN_FUNCTIONAL_ASSESSMENT: 0-10

## 2024-01-22 ASSESSMENT — PAIN DESCRIPTION - LOCATION: LOCATION: MOUTH

## 2024-01-23 NOTE — ED TRIAGE NOTES
Pt presents to ED with chief complaint of lip swelling that has been going on for the past week. Pt states she was given valacyclovir to help with cold sores and she has a known cyclovir allergy. States ever since then her lip has been swollen. Pt was started on prednisone 2 days ago.

## 2024-01-23 NOTE — ED PROVIDER NOTES
[facility] Emergency Department    CHIEF COMPLAINT       Chief Complaint   Patient presents with    Oral Swelling       Nurses Notes reviewed and I agree except as noted in the HPI.    HISTORY OF PRESENT ILLNESS   Carl Fuchs is a 24 y.o. female who presents to the ED for evaluation of lip swelling.  Patient reports going to urgent care last Tuesday, being diagnosed with fever blister.  Was started on valacyclovir.  Notes that she has an allergy to acyclovir, noted lip swelling.  Notes that she was seen in the ER yesterday, started on prednisone, has also been taking over-the-counter Benadryl.  She notes swelling continues.  She notes her lips are itchy and painful.  She notes that she also had COVID-19 last week.  She denies any history of angioedema, she denies any swelling of the tongue or throat, she denies any shortness of breath.      HPI was provided by the patient.         PAST MEDICAL HISTORY     Past Medical History:   Diagnosis Date    Polycystic ovary disease        SURGICALHISTORY      has a past surgical history that includes Tonsillectomy and adenoidectomy; cyst removal (Left); Cholecystectomy, laparoscopic (N/A, 1/30/2023); and Colonoscopy (N/A, 12/26/2023).    CURRENT MEDICATIONS       Discharge Medication List as of 1/22/2024 10:17 PM        CONTINUE these medications which have NOT CHANGED    Details   predniSONE (DELTASONE) 20 MG tablet Take 3 tablets by mouth once daily for 5 days, Disp-15 tablet, R-0Normal      ibuprofen (ADVIL;MOTRIN) 800 MG tablet Take 1 tablet by mouth every 6 hours as needed for PainHistorical Med      cyclobenzaprine (FLEXERIL) 10 MG tablet Take 1 tablet by mouth 3 times daily as needed for Muscle spasmsHistorical Med      etodolac (LODINE) 300 MG capsule Take 1 capsule by mouth in the morning and 1 capsule at noon and 1 capsule in the evening., Disp-30 capsule, R-0Normal      fluticasone (FLONASE) 50 MCG/ACT nasal spray 1 spray by Each Nostril route daily, Disp-32 g,

## 2024-01-24 ENCOUNTER — HOSPITAL ENCOUNTER (EMERGENCY)
Age: 24
Discharge: HOME | End: 2024-01-24
Attending: EMERGENCY MEDICINE
Payer: COMMERCIAL

## 2024-01-24 ENCOUNTER — APPOINTMENT (OUTPATIENT)
Dept: GENERAL RADIOLOGY | Age: 24
End: 2024-01-24
Payer: COMMERCIAL

## 2024-01-24 VITALS
DIASTOLIC BLOOD PRESSURE: 84 MMHG | HEART RATE: 108 BPM | SYSTOLIC BLOOD PRESSURE: 126 MMHG | OXYGEN SATURATION: 99 % | TEMPERATURE: 98.2 F | RESPIRATION RATE: 20 BRPM

## 2024-01-24 DIAGNOSIS — R22.0 SWELLING OF BOTH LIPS: Primary | ICD-10-CM

## 2024-01-24 LAB
ALBUMIN SERPL BCG-MCNC: 4.1 G/DL (ref 3.5–5.1)
ALP SERPL-CCNC: 43 U/L (ref 38–126)
ALT SERPL W/O P-5'-P-CCNC: 24 U/L (ref 11–66)
ANION GAP SERPL CALC-SCNC: 11 MEQ/L (ref 8–16)
AST SERPL-CCNC: 11 U/L (ref 5–40)
B-HCG SERPL QL: NEGATIVE
BASOPHILS ABSOLUTE: 0 THOU/MM3 (ref 0–0.1)
BASOPHILS NFR BLD AUTO: 0.3 %
BILIRUB CONJ SERPL-MCNC: < 0.2 MG/DL (ref 0–0.3)
BILIRUB SERPL-MCNC: 0.2 MG/DL (ref 0.3–1.2)
BUN SERPL-MCNC: 13 MG/DL (ref 7–22)
CALCIUM SERPL-MCNC: 9.1 MG/DL (ref 8.5–10.5)
CHLORIDE SERPL-SCNC: 103 MEQ/L (ref 98–111)
CO2 SERPL-SCNC: 23 MEQ/L (ref 23–33)
CREAT SERPL-MCNC: 0.7 MG/DL (ref 0.4–1.2)
DEPRECATED RDW RBC AUTO: 41 FL (ref 35–45)
EKG ATRIAL RATE: 102 BPM
EKG P AXIS: 55 DEGREES
EKG P-R INTERVAL: 140 MS
EKG Q-T INTERVAL: 328 MS
EKG QRS DURATION: 86 MS
EKG QTC CALCULATION (BAZETT): 427 MS
EKG R AXIS: 16 DEGREES
EKG T AXIS: 18 DEGREES
EKG VENTRICULAR RATE: 102 BPM
EOSINOPHIL NFR BLD AUTO: 0.6 %
EOSINOPHILS ABSOLUTE: 0.1 THOU/MM3 (ref 0–0.4)
ERYTHROCYTE [DISTWIDTH] IN BLOOD BY AUTOMATED COUNT: 12.6 % (ref 11.5–14.5)
GFR SERPL CREATININE-BSD FRML MDRD: > 60 ML/MIN/1.73M2
GLUCOSE SERPL-MCNC: 146 MG/DL (ref 70–108)
HCT VFR BLD AUTO: 41.8 % (ref 37–47)
HGB BLD-MCNC: 13.1 GM/DL (ref 12–16)
IMM GRANULOCYTES # BLD AUTO: 0.07 THOU/MM3 (ref 0–0.07)
IMM GRANULOCYTES NFR BLD AUTO: 0.5 %
LIPASE SERPL-CCNC: 12.6 U/L (ref 5.6–51.3)
LYMPHOCYTES ABSOLUTE: 2.6 THOU/MM3 (ref 1–4.8)
LYMPHOCYTES NFR BLD AUTO: 18.1 %
MAGNESIUM SERPL-MCNC: 2 MG/DL (ref 1.6–2.4)
MCH RBC QN AUTO: 28.5 PG (ref 26–33)
MCHC RBC AUTO-ENTMCNC: 31.3 GM/DL (ref 32.2–35.5)
MCV RBC AUTO: 90.9 FL (ref 81–99)
MONOCYTES ABSOLUTE: 0.7 THOU/MM3 (ref 0.4–1.3)
MONOCYTES NFR BLD AUTO: 4.9 %
NEUTROPHILS NFR BLD AUTO: 75.6 %
NRBC BLD AUTO-RTO: 0 /100 WBC
NT-PROBNP SERPL IA-MCNC: 37.9 PG/ML (ref 0–124)
OSMOLALITY SERPL CALC.SUM OF ELEC: 276.6 MOSMOL/KG (ref 275–300)
PLATELET # BLD AUTO: 373 THOU/MM3 (ref 130–400)
PMV BLD AUTO: 9.7 FL (ref 9.4–12.4)
POTASSIUM SERPL-SCNC: 3.6 MEQ/L (ref 3.5–5.2)
PROT SERPL-MCNC: 7 G/DL (ref 6.1–8)
RBC # BLD AUTO: 4.6 MILL/MM3 (ref 4.2–5.4)
SEGMENTED NEUTROPHILS ABSOLUTE COUNT: 10.9 THOU/MM3 (ref 1.8–7.7)
SODIUM SERPL-SCNC: 137 MEQ/L (ref 135–145)
TROPONIN, HIGH SENSITIVITY: < 6 NG/L (ref 0–12)
WBC # BLD AUTO: 14.4 THOU/MM3 (ref 4.8–10.8)

## 2024-01-24 PROCEDURE — 93005 ELECTROCARDIOGRAM TRACING: CPT

## 2024-01-24 PROCEDURE — 99285 EMERGENCY DEPT VISIT HI MDM: CPT

## 2024-01-24 PROCEDURE — 80053 COMPREHEN METABOLIC PANEL: CPT

## 2024-01-24 PROCEDURE — 83735 ASSAY OF MAGNESIUM: CPT

## 2024-01-24 PROCEDURE — 36415 COLL VENOUS BLD VENIPUNCTURE: CPT

## 2024-01-24 PROCEDURE — 85025 COMPLETE CBC W/AUTO DIFF WBC: CPT

## 2024-01-24 PROCEDURE — 71045 X-RAY EXAM CHEST 1 VIEW: CPT

## 2024-01-24 PROCEDURE — 83880 ASSAY OF NATRIURETIC PEPTIDE: CPT

## 2024-01-24 PROCEDURE — 84484 ASSAY OF TROPONIN QUANT: CPT

## 2024-01-24 PROCEDURE — 83690 ASSAY OF LIPASE: CPT

## 2024-01-24 PROCEDURE — 82248 BILIRUBIN DIRECT: CPT

## 2024-01-24 PROCEDURE — 84703 CHORIONIC GONADOTROPIN ASSAY: CPT

## 2024-01-24 PROCEDURE — 6370000000 HC RX 637 (ALT 250 FOR IP)

## 2024-01-24 PROCEDURE — 93010 ELECTROCARDIOGRAM REPORT: CPT | Performed by: INTERNAL MEDICINE

## 2024-01-24 RX ADMIN — LIDOCAINE HYDROCHLORIDE: 20 SOLUTION ORAL; TOPICAL at 18:08

## 2024-01-24 ASSESSMENT — PAIN - FUNCTIONAL ASSESSMENT: PAIN_FUNCTIONAL_ASSESSMENT: 0-10

## 2024-01-24 ASSESSMENT — PAIN DESCRIPTION - LOCATION: LOCATION: MOUTH

## 2024-01-24 ASSESSMENT — PAIN SCALES - GENERAL: PAINLEVEL_OUTOF10: 9

## 2024-01-24 NOTE — ED PROVIDER NOTES
that her legs were more swollen than normal.  Discussed results with patient and family bedside.  Attempted to call patient's primary care physician who did not answer.  Advised patient that she needs to follow-up with her primary care physician and also see dermatologist about her lips.  Advised the patient to continue taking the prednisone, Pepcid and Benadryl as previously prescribed.  Patient discharged home with strict return precautions      ED COURSE   ED Medications administered this visit (None if left blank):   Medications   aluminum & magnesium hydroxide-simethicone (MAALOX) 30 mL, lidocaine viscous hcl (XYLOCAINE) 5 mL (GI COCKTAIL) ( Oral Given 1/24/24 1808)                PROCEDURES: (None if blank)  Procedures:     CRITICAL CARE:  None    MEDICATION CHANGES     Discharge Medication List as of 1/24/2024  6:23 PM            FINAL DISPOSITION   Shared Decision-Making was performed, disposition discussed with the patient/family and questions answered.      Outpatient follow up (If applicable):  Crystal Jalloh, APRN - CNP  [address] [facility] [address] [phone]      Post ED visit follow-up    [facility]  [address] [id number] [phone]    Post ED visit follow-up                   FINAL DIAGNOSES:  Final diagnoses:   Swelling of both lips       Condition: condition: good  Dispo: Discharge to home  DISPOSITION Decision To Discharge 01/24/2024 06:23:20 PM      This transcription was electronically signed. It was dictated by use of voice recognition software and electronically transcribed. The transcription may contain errors not detected in proofreading.

## 2024-01-24 NOTE — ED NOTES
This nurse called to patient's room. Upon entering room, patient was asked by this nurse if she had her call light on. Pt responds with \"I lindy had my call light on\". This nurse responds with \"ok. What did you need\". Pt reports she is having some LUQ abdominal pain that radiates into her chest. Let patient know that I will notify the provider so he can reevaluate her.

## 2024-01-24 NOTE — DISCHARGE INSTRUCTIONS
Return to the ED immediately for any change or worsening of symptoms including but not limited to chest pain, shortness of breath, dizziness, nausea, vomiting, fever, chills.

## 2024-01-24 NOTE — ED NOTES
Pt to ER with complaints of sob and swelling in her lips. Upon assessment, it is found that patient was placed on acyclovir for a cold sore this past weekend. She was seen in the ER on Sunday and Monday for an allergic reaction. States her lips were swollen and she was SOB then. Pt reports her sob and lip swelling is getting worse. Pt arrives with neosporin all over her lips. Reports she has been taking the 3 medications she was prescribed when she was in the ER 2 days ago. Respirations are easy and unlabored at this time. EKG completed upon arrival.

## 2024-02-09 ENCOUNTER — ANESTHESIA EVENT (OUTPATIENT)
Dept: ENDOSCOPY | Age: 24
End: 2024-02-09
Payer: COMMERCIAL

## 2024-02-09 ENCOUNTER — HOSPITAL ENCOUNTER (OUTPATIENT)
Age: 24
Setting detail: OUTPATIENT SURGERY
Discharge: HOME | End: 2024-02-09
Attending: INTERNAL MEDICINE | Admitting: INTERNAL MEDICINE
Payer: COMMERCIAL

## 2024-02-09 ENCOUNTER — ANESTHESIA (OUTPATIENT)
Dept: ENDOSCOPY | Age: 24
End: 2024-02-09
Payer: COMMERCIAL

## 2024-02-09 VITALS
WEIGHT: 293 LBS | BODY MASS INDEX: 47.09 KG/M2 | SYSTOLIC BLOOD PRESSURE: 106 MMHG | TEMPERATURE: 97.4 F | HEIGHT: 66 IN | OXYGEN SATURATION: 97 % | RESPIRATION RATE: 18 BRPM | DIASTOLIC BLOOD PRESSURE: 61 MMHG | HEART RATE: 79 BPM

## 2024-02-09 LAB — PREGNANCY, URINE: NEGATIVE

## 2024-02-09 PROCEDURE — 7100000011 HC PHASE II RECOVERY - ADDTL 15 MIN: Performed by: INTERNAL MEDICINE

## 2024-02-09 PROCEDURE — 81025 URINE PREGNANCY TEST: CPT

## 2024-02-09 PROCEDURE — 88305 TISSUE EXAM BY PATHOLOGIST: CPT

## 2024-02-09 PROCEDURE — 2500000003 HC RX 250 WO HCPCS: Performed by: NURSE ANESTHETIST, CERTIFIED REGISTERED

## 2024-02-09 PROCEDURE — 3700000000 HC ANESTHESIA ATTENDED CARE: Performed by: INTERNAL MEDICINE

## 2024-02-09 PROCEDURE — 3609012400 HC EGD TRANSORAL BIOPSY SINGLE/MULTIPLE: Performed by: INTERNAL MEDICINE

## 2024-02-09 PROCEDURE — 7100000010 HC PHASE II RECOVERY - FIRST 15 MIN: Performed by: INTERNAL MEDICINE

## 2024-02-09 PROCEDURE — 6360000002 HC RX W HCPCS: Performed by: NURSE ANESTHETIST, CERTIFIED REGISTERED

## 2024-02-09 PROCEDURE — 2580000003 HC RX 258: Performed by: NURSE ANESTHETIST, CERTIFIED REGISTERED

## 2024-02-09 RX ORDER — SODIUM CHLORIDE, SODIUM LACTATE, POTASSIUM CHLORIDE, CALCIUM CHLORIDE 600; 310; 30; 20 MG/100ML; MG/100ML; MG/100ML; MG/100ML
INJECTION, SOLUTION INTRAVENOUS CONTINUOUS PRN
Status: DISCONTINUED | OUTPATIENT
Start: 2024-02-09 | End: 2024-02-09 | Stop reason: SDUPTHER

## 2024-02-09 RX ORDER — PROPOFOL 10 MG/ML
INJECTION, EMULSION INTRAVENOUS PRN
Status: DISCONTINUED | OUTPATIENT
Start: 2024-02-09 | End: 2024-02-09 | Stop reason: SDUPTHER

## 2024-02-09 RX ORDER — LIDOCAINE HYDROCHLORIDE 20 MG/ML
INJECTION, SOLUTION EPIDURAL; INFILTRATION; INTRACAUDAL; PERINEURAL PRN
Status: DISCONTINUED | OUTPATIENT
Start: 2024-02-09 | End: 2024-02-09 | Stop reason: SDUPTHER

## 2024-02-09 RX ADMIN — LIDOCAINE HYDROCHLORIDE 100 MG: 20 INJECTION, SOLUTION EPIDURAL; INFILTRATION; INTRACAUDAL; PERINEURAL at 10:08

## 2024-02-09 RX ADMIN — SODIUM CHLORIDE, POTASSIUM CHLORIDE, SODIUM LACTATE AND CALCIUM CHLORIDE: 600; 310; 30; 20 INJECTION, SOLUTION INTRAVENOUS at 10:03

## 2024-02-09 RX ADMIN — PROPOFOL 200 MG: 10 INJECTION, EMULSION INTRAVENOUS at 10:08

## 2024-02-09 ASSESSMENT — PAIN - FUNCTIONAL ASSESSMENT
PAIN_FUNCTIONAL_ASSESSMENT: NONE - DENIES PAIN
PAIN_FUNCTIONAL_ASSESSMENT: NONE - DENIES PAIN

## 2024-02-09 NOTE — DISCHARGE INSTRUCTIONS
IMPRESSION:      1.  Biopsies of the 2nd portion of the duodenum rule out celiac sprue, given the diarrhea  .      2.  Biopsies of the distal esophagus , esophagitis LA Class B, and rule out Hussein's .      3.   Otherwise, normal exam to D2.  .     RECOMMENDATIONS:    1. Omeprazole 40mg daily, begin   2. Consider weight loss program , bariatric surgery program.   3. Follow up with RODOLFO Doty CNP: on 3/26/24

## 2024-02-09 NOTE — ANESTHESIA PRE PROCEDURE
Department of Anesthesiology  Preprocedure Note       Name:  Carl Fuchs   Age:  24 y.o.  :  2000                                          MRN:  [id number]         Date:  2024      Surgeon: Surgeon(s):  Marci Christiansen MD    Procedure: Procedure(s):  EGD    Medications prior to admission:   Prior to Admission medications    Medication Sig Start Date End Date Taking? Authorizing Provider   ibuprofen (ADVIL;MOTRIN) 800 MG tablet Take 1 tablet by mouth every 6 hours as needed for Pain    ProviderAlma MD   cyclobenzaprine (FLEXERIL) 10 MG tablet Take 1 tablet by mouth 3 times daily as needed for Muscle spasms  Patient not taking: Reported on 2024    Alma Randolph MD   etodolac (LODINE) 300 MG capsule Take 1 capsule by mouth in the morning and 1 capsule at noon and 1 capsule in the evening.  Patient not taking: Reported on 2024   Manjeet Spence PA   fluticasone (FLONASE) 50 MCG/ACT nasal spray 1 spray by Each Nostril route daily  Patient not taking: Reported on 2024   Yumi Perera PA-C   Prenatal MV-Min-Fe Fum-FA-DHA (PRENATAL 1 PO) Take by mouth  22  ProviderAlma MD       Current medications:    No current facility-administered medications for this encounter.       Allergies:    Allergies   Allergen Reactions   • Acyclovir Hives       Problem List:    Patient Active Problem List   Diagnosis Code   • Obesity E66.9   • Pre-diabetes R73.03   • Bleeding R58   • Vaginal spotting N93.9   • Fall at home, initial encounter W19.XXXA, Y92.009   • Pregnancy complication, antepartum O26.90   • Pregnancy complication before birth O99.891   • Vaginal delivery O80   • Acute cholecystitis K81.0       Past Medical History:        Diagnosis Date   • Polycystic ovary disease        Past Surgical History:        Procedure Laterality Date   • CHOLECYSTECTOMY, LAPAROSCOPIC N/A 2023    Laparoscopic Cholecystectomy performed by Harris Jacques MD at

## 2024-02-09 NOTE — PROGRESS NOTES
EGD complete, photos taken, 2 specimens taken by, pt tolerated procedure well    Scope Number [id number] used.

## 2024-02-09 NOTE — H&P
[facility]  Sedation/Analgesia History & Physical    Patient: Carl Fuchs : 2000  Med Rec#: [id number] Acc#: [id number]   Provider Performing Procedure: Marci Christiansen MD  Primary Care Physician: Crystal Jalloh, [facility] [place]    PRE-PROCEDURE   Brief History/Pre-Procedure Diagnosis:The patient is a 24 y.o.,  female with significant past medical history of GERD, diarrhea since having cholecystectomy.           MEDICAL HISTORY  []CAD/Valve  []Liver Disease  []Lung Disease []Diabetes  []Hypertension []Renal Disease  [x]Additional information:       has a past medical history of Polycystic ovary disease.    SURGICAL HISTORY   has a past surgical history that includes Tonsillectomy and adenoidectomy; cyst removal (Left); Cholecystectomy, laparoscopic (N/A, 2023); and Colonoscopy (N/A, 2023).  Additional information:       ALLERGIES   Allergies as of 2024 - Fully Reviewed 2024   Allergen Reaction Noted    Acyclovir Hives 03/15/2023     Additional information:       MEDICATIONS     No current facility-administered medications for this encounter.  Prior to Admission medications    Medication Sig Start Date End Date Taking? Authorizing Provider   ibuprofen (ADVIL;MOTRIN) 800 MG tablet Take 1 tablet by mouth every 6 hours as needed for Pain    ProviderAlma MD   cyclobenzaprine (FLEXERIL) 10 MG tablet Take 1 tablet by mouth 3 times daily as needed for Muscle spasms  Patient not taking: Reported on 2024    ProviderAlma MD   etodolac (LODINE) 300 MG capsule Take 1 capsule by mouth in the morning and 1 capsule at noon and 1 capsule in the evening.  Patient not taking: Reported on 2024   Manjeet Spence PA   fluticasone (FLONASE) 50 MCG/ACT nasal spray 1 spray by Each Nostril route daily  Patient not taking: Reported on 2024   Yumi Perera PA-C   Prenatal MV-Min-Fe Fum-FA-DHA (PRENATAL 1 PO) Take by mouth  22  Provider,  discussion of sedation/procedure plan, risks, and expectations with patient and/or responsible adult completed.  [x]Patient examined immediately prior to the procedure. (Refer to nursing sedation/analgesia documentation record)    Marci Christiansen MD   Electronically signed 2/9/2024 at 10:06 AM

## 2024-02-09 NOTE — PROCEDURES
[facility] Endoscopy     EGD Report    Patient: Carl Fuchs  : 2000  Acct#: [id number]     BRIEF HISTORY AND INDICATIONS:    The patient is a 24 y.o.,  female with significant past medical history of BMI > 50 , and GERD, and diarrhea s/p cholecystectomy.     PREMEDICATION:  TIVA anesthesia was used, see Anesthesia note for details.    INSTRUMENT:  Olympus GIF H-180 gastroscope    The risks and benefits of upper endoscopy with biopsy and dilation were  described to the patient, including but not limited to bleeding, infection, poking a hole someplace requiring surgery to fix it, having reaction to medication, and death. The patient understood these risks and provided informed consent.  The patient was placed in the left lateral decubitus position.  Conscious sedation was administered .  The patient was continuously monitored to ensure adequate sedation and patient safety.     A forward-viewing Olympus endoscope was lubricated and inserted through the mouth into the oropharynx. Under direct visualization, the upper esophagus was intubated.  The scope was advanced to the esophagus and stomach to second portion of duodenum.  Scope was slowly withdrawn with good views of mucosal surfaces.  The scope was retroflexed in the fundus. Findings and maneuvers are listed in impression below. The patient tolerated the procedure well.  The scope was removed. The patient was removed to the recovery area.       IMPRESSION:      1.  Biopsies of the 2nd portion of the duodenum rule out celiac sprue, given the diarrhea  .      2.  Biopsies of the distal esophagus , esophagitis LA Class B, and rule out Hussein's .      3.   Otherwise, normal exam to D2.  .     RECOMMENDATIONS:    1. Omeprazole 40mg daily, begin   2. Consider weight loss program , bariatric surgery program.   3. Follow up with RODOLFO Doty CNP: on 3/26/24      Specimens: were obtained    EBL : < 10 mL    (The following sections must be

## 2024-02-09 NOTE — ANESTHESIA POSTPROCEDURE EVALUATION
Department of Anesthesiology  Postprocedure Note    Patient: Carl Fuchs  MRN: [id number]  YOB: 2000  Date of evaluation: 2/9/2024    Procedure Summary       Date: 02/09/24 Room / Location: [place] [address] / [facility]    Anesthesia Start: 1003 Anesthesia Stop: 1022    Procedure: EGD BIOPSY Diagnosis:       Gastroesophageal reflux disease, unspecified whether esophagitis present      (Gastroesophageal reflux disease, unspecified whether esophagitis present [K21.9])    Surgeons: Marci Christiansen MD Responsible Provider: Chai Lozano DO    Anesthesia Type: MAC ASA Status: 3            Anesthesia Type: No value filed.    Prudencio Phase I: Prudencio Score: 10    Prudencio Phase II:      Anesthesia Post Evaluation    Patient location during evaluation: bedside  Patient participation: complete - patient participated  Level of consciousness: awake and alert  Airway patency: patent  Nausea & Vomiting: no nausea and no vomiting  Cardiovascular status: hemodynamically stable  Respiratory status: spontaneous ventilation and acceptable  Hydration status: euvolemic  Pain management: adequate        No notable events documented.

## 2024-02-20 ENCOUNTER — HOSPITAL ENCOUNTER (EMERGENCY)
Age: 24
Discharge: HOME | End: 2024-02-20
Attending: EMERGENCY MEDICINE
Payer: COMMERCIAL

## 2024-02-20 VITALS
OXYGEN SATURATION: 97 % | TEMPERATURE: 97.8 F | HEART RATE: 87 BPM | RESPIRATION RATE: 18 BRPM | DIASTOLIC BLOOD PRESSURE: 88 MMHG | SYSTOLIC BLOOD PRESSURE: 151 MMHG

## 2024-02-20 DIAGNOSIS — N85.8 DECIDUAL CAST: Primary | ICD-10-CM

## 2024-02-20 LAB
ANION GAP SERPL CALC-SCNC: 16 MEQ/L (ref 8–16)
B-HCG SERPL QL: NEGATIVE
BASOPHILS ABSOLUTE: 0 THOU/MM3 (ref 0–0.1)
BASOPHILS NFR BLD AUTO: 0.3 %
BUN SERPL-MCNC: 12 MG/DL (ref 7–22)
CALCIUM SERPL-MCNC: 9.6 MG/DL (ref 8.5–10.5)
CHLORIDE SERPL-SCNC: 103 MEQ/L (ref 98–111)
CO2 SERPL-SCNC: 22 MEQ/L (ref 23–33)
CREAT SERPL-MCNC: 0.8 MG/DL (ref 0.4–1.2)
DEPRECATED RDW RBC AUTO: 42.8 FL (ref 35–45)
EOSINOPHIL NFR BLD AUTO: 0.9 %
EOSINOPHILS ABSOLUTE: 0.1 THOU/MM3 (ref 0–0.4)
ERYTHROCYTE [DISTWIDTH] IN BLOOD BY AUTOMATED COUNT: 12.9 % (ref 11.5–14.5)
GFR SERPL CREATININE-BSD FRML MDRD: > 60 ML/MIN/1.73M2
GLUCOSE SERPL-MCNC: 94 MG/DL (ref 70–108)
HCG INTACT+B SERPL-ACNC: < 1 MIU/ML (ref 0–5)
HCT VFR BLD AUTO: 41 % (ref 37–47)
HGB BLD-MCNC: 12.9 GM/DL (ref 12–16)
IMM GRANULOCYTES # BLD AUTO: 0.02 THOU/MM3 (ref 0–0.07)
IMM GRANULOCYTES NFR BLD AUTO: 0.2 %
LYMPHOCYTES ABSOLUTE: 3.3 THOU/MM3 (ref 1–4.8)
LYMPHOCYTES NFR BLD AUTO: 30.7 %
MCH RBC QN AUTO: 28.7 PG (ref 26–33)
MCHC RBC AUTO-ENTMCNC: 31.5 GM/DL (ref 32.2–35.5)
MCV RBC AUTO: 91.1 FL (ref 81–99)
MONOCYTES ABSOLUTE: 0.7 THOU/MM3 (ref 0.4–1.3)
MONOCYTES NFR BLD AUTO: 6.9 %
NEUTROPHILS NFR BLD AUTO: 61 %
NRBC BLD AUTO-RTO: 0 /100 WBC
OSMOLALITY SERPL CALC.SUM OF ELEC: 280.8 MOSMOL/KG (ref 275–300)
PLATELET # BLD AUTO: 385 THOU/MM3 (ref 130–400)
PMV BLD AUTO: 9.5 FL (ref 9.4–12.4)
POTASSIUM SERPL-SCNC: 3.5 MEQ/L (ref 3.5–5.2)
RBC # BLD AUTO: 4.5 MILL/MM3 (ref 4.2–5.4)
SEGMENTED NEUTROPHILS ABSOLUTE COUNT: 6.5 THOU/MM3 (ref 1.8–7.7)
SODIUM SERPL-SCNC: 141 MEQ/L (ref 135–145)
WBC # BLD AUTO: 10.6 THOU/MM3 (ref 4.8–10.8)

## 2024-02-20 PROCEDURE — 36415 COLL VENOUS BLD VENIPUNCTURE: CPT

## 2024-02-20 PROCEDURE — 99283 EMERGENCY DEPT VISIT LOW MDM: CPT

## 2024-02-20 PROCEDURE — 80048 BASIC METABOLIC PNL TOTAL CA: CPT

## 2024-02-20 PROCEDURE — 85025 COMPLETE CBC W/AUTO DIFF WBC: CPT

## 2024-02-20 PROCEDURE — 84702 CHORIONIC GONADOTROPIN TEST: CPT

## 2024-02-20 PROCEDURE — 84703 CHORIONIC GONADOTROPIN ASSAY: CPT

## 2024-02-20 NOTE — ED PROVIDER NOTES
[facility] EMERGENCY DEPT      EMERGENCY MEDICINE     Pt Name: Carl Fuchs  MRN: [id number]  Birthdate 2000  Date of evaluation: 2/20/2024  Provider: Husam Neumann DO  Supervising Physician: Lili Kay MD    CHIEF COMPLAINT       Chief Complaint   Patient presents with    Threatened Miscarriage     HISTORY OF PRESENT ILLNESS   Carl Fuchs is a 24 y.o. female with a history of PCOS, G2, P2 who presents to the emergency department from home for concerns that she is having a miscarriage.  Patient reports that her periods are regular and that she always arrives between the sixth or eighth of the month.  She did have her period at this time.  Yesterday she start having some spotting and today she felt something come out of her vagina that she was concerned was some fetal material.  Patient is sexually active with only 1 partner, has no history of STIs and has no concern for STIs.  Patient denies dysuria, fever.    PASTMEDICAL HISTORY     Past Medical History:   Diagnosis Date    Polycystic ovary disease        Patient Active Problem List   Diagnosis Code    Obesity E66.9    Pre-diabetes R73.03    Bleeding R58    Vaginal spotting N93.9    Fall at home, initial encounter W19.XXXA, Y92.009    Pregnancy complication, antepartum O26.90    Pregnancy complication before birth O99.891    Vaginal delivery O80    Acute cholecystitis K81.0     SURGICAL HISTORY       Past Surgical History:   Procedure Laterality Date    CHOLECYSTECTOMY, LAPAROSCOPIC N/A 1/30/2023    Laparoscopic Cholecystectomy performed by Harris Jacques MD at [facility] OR    COLONOSCOPY N/A 12/26/2023    COLONOSCOPY WITH BIOPSY performed by Marci Christiansen MD at [facility] ENDOSCOPY    CYST REMOVAL Left     wrist    TONSILLECTOMY AND ADENOIDECTOMY      UPPER GASTROINTESTINAL ENDOSCOPY N/A 2/9/2024    EGD BIOPSY performed by Marci Christiansen MD at [facility] ENDOSCOPY       CURRENT MEDICATIONS       Previous Medications    CYCLOBENZAPRINE (FLEXERIL) 10 MG

## 2024-02-20 NOTE — ED TRIAGE NOTES
Pt presents to the ED with complaints of a possible miscarriage. Pt states that she did not know that she was pregnant but that she missed her last period. Pt states that then tonight she has something pass that she thinks was maybe a fetus/fetal tissue. Pt states that her menstrual cycle is normally routine.

## 2024-02-20 NOTE — DISCHARGE INSTRUCTIONS
You were seen here today to evaluate for a possible miscarriage. Return here if you develop significant vaginal bleeding.

## 2024-04-24 ENCOUNTER — APPOINTMENT (OUTPATIENT)
Dept: GENERAL RADIOLOGY | Age: 24
End: 2024-04-24
Payer: COMMERCIAL

## 2024-04-24 ENCOUNTER — HOSPITAL ENCOUNTER (EMERGENCY)
Age: 24
Discharge: HOME | End: 2024-04-24
Attending: EMERGENCY MEDICINE
Payer: COMMERCIAL

## 2024-04-24 VITALS
HEART RATE: 82 BPM | BODY MASS INDEX: 47.09 KG/M2 | DIASTOLIC BLOOD PRESSURE: 68 MMHG | RESPIRATION RATE: 17 BRPM | HEIGHT: 66 IN | WEIGHT: 293 LBS | TEMPERATURE: 98.1 F | OXYGEN SATURATION: 99 % | SYSTOLIC BLOOD PRESSURE: 128 MMHG

## 2024-04-24 DIAGNOSIS — R07.89 CHEST WALL PAIN: Primary | ICD-10-CM

## 2024-04-24 LAB
ANION GAP SERPL CALC-SCNC: 10 MEQ/L (ref 8–16)
BASOPHILS ABSOLUTE: 0 THOU/MM3 (ref 0–0.1)
BASOPHILS NFR BLD AUTO: 0.3 %
BUN SERPL-MCNC: 15 MG/DL (ref 7–22)
CALCIUM SERPL-MCNC: 9.3 MG/DL (ref 8.5–10.5)
CHLORIDE SERPL-SCNC: 102 MEQ/L (ref 98–111)
CO2 SERPL-SCNC: 26 MEQ/L (ref 23–33)
CREAT SERPL-MCNC: 0.8 MG/DL (ref 0.4–1.2)
DEPRECATED RDW RBC AUTO: 41.9 FL (ref 35–45)
EKG ATRIAL RATE: 75 BPM
EKG P AXIS: 50 DEGREES
EKG P-R INTERVAL: 138 MS
EKG Q-T INTERVAL: 338 MS
EKG QRS DURATION: 78 MS
EKG QTC CALCULATION (BAZETT): 377 MS
EKG R AXIS: 30 DEGREES
EKG T AXIS: 30 DEGREES
EKG VENTRICULAR RATE: 75 BPM
EOSINOPHIL NFR BLD AUTO: 1.3 %
EOSINOPHILS ABSOLUTE: 0.1 THOU/MM3 (ref 0–0.4)
ERYTHROCYTE [DISTWIDTH] IN BLOOD BY AUTOMATED COUNT: 12.6 % (ref 11.5–14.5)
GFR SERPL CREATININE-BSD FRML MDRD: > 90 ML/MIN/1.73M2
GLUCOSE SERPL-MCNC: 70 MG/DL (ref 70–108)
HCT VFR BLD AUTO: 41.1 % (ref 37–47)
HGB BLD-MCNC: 12.8 GM/DL (ref 12–16)
IMM GRANULOCYTES # BLD AUTO: 0.02 THOU/MM3 (ref 0–0.07)
IMM GRANULOCYTES NFR BLD AUTO: 0.2 %
LYMPHOCYTES ABSOLUTE: 3.2 THOU/MM3 (ref 1–4.8)
LYMPHOCYTES NFR BLD AUTO: 31.6 %
MCH RBC QN AUTO: 28.5 PG (ref 26–33)
MCHC RBC AUTO-ENTMCNC: 31.1 GM/DL (ref 32.2–35.5)
MCV RBC AUTO: 91.5 FL (ref 81–99)
MONOCYTES ABSOLUTE: 0.9 THOU/MM3 (ref 0.4–1.3)
MONOCYTES NFR BLD AUTO: 8.6 %
NEUTROPHILS NFR BLD AUTO: 58 %
NRBC BLD AUTO-RTO: 0 /100 WBC
OSMOLALITY SERPL CALC.SUM OF ELEC: 274.9 MOSMOL/KG (ref 275–300)
PLATELET # BLD AUTO: 350 THOU/MM3 (ref 130–400)
PMV BLD AUTO: 10.3 FL (ref 9.4–12.4)
POTASSIUM SERPL-SCNC: 4.2 MEQ/L (ref 3.5–5.2)
RBC # BLD AUTO: 4.49 MILL/MM3 (ref 4.2–5.4)
SEGMENTED NEUTROPHILS ABSOLUTE COUNT: 5.8 THOU/MM3 (ref 1.8–7.7)
SODIUM SERPL-SCNC: 138 MEQ/L (ref 135–145)
WBC # BLD AUTO: 10 THOU/MM3 (ref 4.8–10.8)

## 2024-04-24 PROCEDURE — 6360000002 HC RX W HCPCS: Performed by: EMERGENCY MEDICINE

## 2024-04-24 PROCEDURE — 99285 EMERGENCY DEPT VISIT HI MDM: CPT

## 2024-04-24 PROCEDURE — 96372 THER/PROPH/DIAG INJ SC/IM: CPT

## 2024-04-24 PROCEDURE — 85025 COMPLETE CBC W/AUTO DIFF WBC: CPT

## 2024-04-24 PROCEDURE — 93010 ELECTROCARDIOGRAM REPORT: CPT | Performed by: NUCLEAR MEDICINE

## 2024-04-24 PROCEDURE — 80048 BASIC METABOLIC PNL TOTAL CA: CPT

## 2024-04-24 PROCEDURE — 71046 X-RAY EXAM CHEST 2 VIEWS: CPT

## 2024-04-24 PROCEDURE — 93005 ELECTROCARDIOGRAM TRACING: CPT | Performed by: EMERGENCY MEDICINE

## 2024-04-24 PROCEDURE — 36415 COLL VENOUS BLD VENIPUNCTURE: CPT

## 2024-04-24 RX ORDER — KETOROLAC TROMETHAMINE 30 MG/ML
15 INJECTION, SOLUTION INTRAMUSCULAR; INTRAVENOUS ONCE
Status: COMPLETED | OUTPATIENT
Start: 2024-04-24 | End: 2024-04-24

## 2024-04-24 RX ADMIN — KETOROLAC TROMETHAMINE 15 MG: 30 INJECTION, SOLUTION INTRAMUSCULAR at 18:28

## 2024-04-24 ASSESSMENT — PAIN - FUNCTIONAL ASSESSMENT
PAIN_FUNCTIONAL_ASSESSMENT: NONE - DENIES PAIN

## 2024-04-24 NOTE — ED NOTES
Pt resting in bed with eyes closed, respirations even and unlabored. No needs expressed at this time. Call light within reach. VSS

## 2024-04-24 NOTE — ED NOTES
Pt medicated per MAR. No needs expressed at this time. Respirations even and unlabored, call light within reach. VSS

## 2024-04-24 NOTE — DISCHARGE INSTR - COC
thickness:58078}  Daily Fluid Restriction: {CHP DME Yes amt example:227248568}  Last Modified Barium Swallow with Video (Video Swallowing Test): {Done Not Done Date:}    Treatments at the Time of Hospital Discharge:   Respiratory Treatments: ***  Oxygen Therapy:  {Therapy; copd oxygen:44435}  Ventilator:    { CC Vent List:955008614}    Rehab Therapies: {THERAPEUTIC INTERVENTION:7424073450}  Weight Bearing Status/Restrictions: {[facility] Weight Bearin}  Other Medical Equipment (for information only, NOT a DME order):  {EQUIPMENT:333431829}  Other Treatments: ***    Patient's personal belongings (please select all that are sent with patient):  {[facility] DME Belongings:757462351}    RN SIGNATURE:  {Esignature:172318597}    CASE MANAGEMENT/SOCIAL WORK SECTION    Inpatient Status Date: ***    Readmission Risk Assessment Score:  Readmission Risk              Risk of Unplanned Readmission:  0           Discharging to Facility/ Agency   Name:   Address:  Phone:  Fax:    Dialysis Facility (if applicable)   Name:  Address:  Dialysis Schedule:  Phone:  Fax:    / signature: {Esignature:785625291}    PHYSICIAN SECTION    Prognosis: {Prognosis:7584473050}    Condition at Discharge: { Patient Condition:922406303}    Rehab Potential (if transferring to Rehab): {Prognosis:4119328120}    Recommended Labs or Other Treatments After Discharge: ***    Physician Certification: I certify the above information and transfer of Carl Fuchs  is necessary for the continuing treatment of the diagnosis listed and that she requires {Admit to Appropriate Level of Care:27241} for {GREATER/LESS:612127734} 30 days.     Update Admission H&P: {CHP DME Changes in HandP:991030955}    PHYSICIAN SIGNATURE:  {Esignature:977065534}

## 2024-04-24 NOTE — ED PROVIDER NOTES
[facility] EMERGENCY DEPT      CHIEF COMPLAINT       Chief Complaint   Patient presents with    Chest Pain       Nurses Notes reviewed and I agree except as noted in the HPI.      HISTORY OF PRESENT ILLNESS    Carl Fuchs is a 24 y.o. female who presents with complaint of right chest wall pain, worse with inspiration/palpation.  No cough, no fever or chills.  No history of DVT/PE.  No reports of shortness of breath.  No known sick contact.  Onset: Acute  Duration: Less than 24 hours  Timing: Distant  Location of Pain: Right upper chest wall  Intesity/severity: Mild  Modifying Factors: Palpation/inspiration  Relieved by;  Previous Episodes;  Tx Before arrival: None  PAST MEDICAL HISTORY    has a past medical history of Polycystic ovary disease.    SURGICAL HISTORY      has a past surgical history that includes Tonsillectomy and adenoidectomy; cyst removal (Left); Cholecystectomy, laparoscopic (N/A, 1/30/2023); Colonoscopy (N/A, 12/26/2023); and Upper gastrointestinal endoscopy (N/A, 2/9/2024).    CURRENT MEDICATIONS       Discharge Medication List as of 4/24/2024  7:50 PM        CONTINUE these medications which have NOT CHANGED    Details   ibuprofen (ADVIL;MOTRIN) 800 MG tablet Take 1 tablet by mouth every 6 hours as needed for PainHistorical Med      cyclobenzaprine (FLEXERIL) 10 MG tablet Take 1 tablet by mouth 3 times daily as needed for Muscle spasmsHistorical Med      etodolac (LODINE) 300 MG capsule Take 1 capsule by mouth in the morning and 1 capsule at noon and 1 capsule in the evening., Disp-30 capsule, R-0Normal      fluticasone (FLONASE) 50 MCG/ACT nasal spray 1 spray by Each Nostril route daily, Disp-32 g, R-1Normal             ALLERGIES     is allergic to acyclovir.    FAMILY HISTORY     She indicated that her mother is alive. She indicated that her father is alive. She indicated that the status of her sister is unknown. She indicated that the status of her maternal grandmother is unknown. She

## 2024-04-24 NOTE — ED TRIAGE NOTES
Pt presents to the ER with c/o CP x1 year. Pt states it comes and goes and sometimes lasts up to an hour. Pt denies cardiac hx. Pt is alert and oriented, respirations even and unlabored. VSS

## 2024-09-05 ENCOUNTER — OFFICE VISIT (OUTPATIENT)
Dept: BARIATRICS/WEIGHT MGMT | Age: 24
End: 2024-09-05
Payer: COMMERCIAL

## 2024-09-05 VITALS
BODY MASS INDEX: 47.09 KG/M2 | WEIGHT: 293 LBS | SYSTOLIC BLOOD PRESSURE: 134 MMHG | HEIGHT: 66 IN | TEMPERATURE: 98.4 F | DIASTOLIC BLOOD PRESSURE: 94 MMHG | HEART RATE: 68 BPM

## 2024-09-05 DIAGNOSIS — K21.9 GASTROESOPHAGEAL REFLUX DISEASE, UNSPECIFIED WHETHER ESOPHAGITIS PRESENT: ICD-10-CM

## 2024-09-05 DIAGNOSIS — K76.0 FATTY LIVER: ICD-10-CM

## 2024-09-05 PROCEDURE — 99204 OFFICE O/P NEW MOD 45 MIN: CPT | Performed by: PHYSICIAN ASSISTANT

## 2024-09-05 RX ORDER — FAMOTIDINE 20 MG/1
20 TABLET, FILM COATED ORAL NIGHTLY
COMMUNITY

## 2024-09-05 NOTE — PROGRESS NOTES
[facility] [place] [facility]  [facility]  [address], [address]  [place] [address]  Dept: [phone]  Dept Fax: [phone]  Loc: [phone]         Visit Date:  9/5/2024  Weight Management Initial Evaluation Note    HPI:      The patient is a 24 y.o. female being seen for initial evaluation in the weight management center. She attended the informational seminar and is interested in medical management of weight loss-may switch to surgical pathway.  The patient's PCP is Crystal Jalloh APRN - CNP.  The patient first recognized that she had excess weight since childhood. Her highest adult weight has been 410 lbs and lowest adult weight was 280 lbs.  Current weight is 379.   Current BMI is Body mass index is 61.2 kg/m².. The patient reports that her obesity is significantly affecting her life on a daily basis.       Weight Loss History:   She has tried below methods to try to lose weight .  These attempts have been somewhat successful with weight loss, but have failed to sustain adequate weight loss. The patient has also tried self directed diet and exercise in attempts to sustain weight loss.   Specific programs tried include:    [] Meal replacement program:    [] Medications/ supplements:    [x] Behavior modification: diet     [] Alternative health:    [] Work with a Dietitian:    [] :    [] Commercial weight loss:    [] Weight Watchers    [] Chio Brunson    [] Nutrisystem    [] Low Calorie diets:    This patient has not had previous bariatric surgery    I have reviewed the 24 hour diet recall with Carl Fuchs.  Additionally, she rates her readiness to change 10.  We also reviewed barriers to change and her personal statement.  Motivators include: to feel better about myself, to look better, to be more active with my children, to improve my health, to increase my energy, and to allow me to live a more active and social

## 2024-09-23 ENCOUNTER — HOSPITAL ENCOUNTER (EMERGENCY)
Age: 24
Discharge: HOME | End: 2024-09-23
Payer: COMMERCIAL

## 2024-09-23 VITALS
SYSTOLIC BLOOD PRESSURE: 147 MMHG | HEART RATE: 82 BPM | RESPIRATION RATE: 20 BRPM | TEMPERATURE: 97.6 F | OXYGEN SATURATION: 99 % | DIASTOLIC BLOOD PRESSURE: 89 MMHG

## 2024-09-23 DIAGNOSIS — K21.00 GASTROESOPHAGEAL REFLUX DISEASE WITH ESOPHAGITIS WITHOUT HEMORRHAGE: Primary | ICD-10-CM

## 2024-09-23 PROCEDURE — 99213 OFFICE O/P EST LOW 20 MIN: CPT

## 2024-09-23 PROCEDURE — 99213 OFFICE O/P EST LOW 20 MIN: CPT | Performed by: NURSE PRACTITIONER

## 2024-09-23 PROCEDURE — 6370000000 HC RX 637 (ALT 250 FOR IP): Performed by: NURSE PRACTITIONER

## 2024-09-23 RX ORDER — ONDANSETRON 4 MG/1
4 TABLET, ORALLY DISINTEGRATING ORAL 3 TIMES DAILY PRN
Qty: 21 TABLET | Refills: 0 | Status: SHIPPED | OUTPATIENT
Start: 2024-09-23

## 2024-09-23 RX ORDER — FAMOTIDINE 20 MG/1
20 TABLET, FILM COATED ORAL NIGHTLY
Qty: 30 TABLET | Refills: 0 | Status: SHIPPED | OUTPATIENT
Start: 2024-09-23

## 2024-09-23 RX ADMIN — LIDOCAINE HYDROCHLORIDE: 20 SOLUTION ORAL at 18:15

## 2024-09-23 NOTE — ED PROVIDER NOTES
[facility] URGENT CARE  UrgentCare Encounter      CHIEFCOMPLAINT       Chief Complaint   Patient presents with    Letter for School/Work       Nurses Notes reviewed and I agree except as noted in the HPI.  HISTORY OF PRESENT ILLNESS   Carl Fuchs is a 24 y.o. female who presents to urgent care with complaints of acid reflux, \"burning in my throat\".  States that she has been out of her Pepcid for \"months\".  Patient is requesting a note for work today.    REVIEW OF SYSTEMS     Review of Systems   Gastrointestinal:  Positive for abdominal pain.       PAST MEDICAL HISTORY         Diagnosis Date    Gallstones     Liver disease     Polycystic ovary disease        SURGICAL HISTORY     Patient  has a past surgical history that includes Tonsillectomy and adenoidectomy; cyst removal (Left); Cholecystectomy, laparoscopic (N/A, 1/30/2023); Colonoscopy (N/A, 12/26/2023); and Upper gastrointestinal endoscopy (N/A, 2/9/2024).    CURRENT MEDICATIONS       Discharge Medication List as of 9/23/2024  6:13 PM        CONTINUE these medications which have NOT CHANGED    Details   ibuprofen (ADVIL;MOTRIN) 800 MG tablet Take 1 tablet by mouth every 6 hours as needed for PainHistorical Med      cyclobenzaprine (FLEXERIL) 10 MG tablet Take 1 tablet by mouth 3 times daily as needed for Muscle spasmsHistorical Med             ALLERGIES     Patient is is allergic to acyclovir and valacyclovir.    FAMILY HISTORY     Patient'sfamily history includes Asthma in her brother and mother; Cancer in her paternal grandmother; Diabetes in her mother, paternal aunt, paternal grandmother, and sister; Gall Bladder Disease in her sister; Thyroid Disease in her maternal grandmother.    SOCIAL HISTORY     Patient  reports that she quit smoking about 4 years ago. Her smoking use included cigarettes. She has never used smokeless tobacco. She reports current drug use. Drug: Marijuana (Weed). She reports that she does not drink alcohol.    PHYSICAL  (XYLOCAINE) 5 mL (GI COCKTAIL) ( Oral Given 9/23/24 1815)     PROCEDURES:  FINALIMPRESSION      1. Gastroesophageal reflux disease with esophagitis without hemorrhage        DISPOSITION/PLAN   DISPOSITION Decision To Discharge 09/23/2024 06:12:48 PM  Condition at Disposition: Good    Patient has a soft nontender abdomen.  She complains of some burning in her throat, similar to symptoms that she has had in the past with acid reflux.  She has been out of her Pepcid for months.  Denies nausea, vomiting, diarrhea.  Patient was given a GI cocktail in urgent care.  Will prescribe Pepcid.  Advise close follow-up with primary care provider.  She denies any questions.    PATIENT REFERRED TO:  Crystal Jalloh, KAL - CNP  [address] [facility] [address] [phone]          DISCHARGE MEDICATIONS:  Discharge Medication List as of 9/23/2024  6:13 PM        START taking these medications    Details   ondansetron (ZOFRAN-ODT) 4 MG disintegrating tablet Take 1 tablet by mouth 3 times daily as needed for Nausea or Vomiting, Disp-21 tablet, R-0Normal           Discharge Medication List as of 9/23/2024  6:13 PM        CONTINUE these medications which have CHANGED    Details   famotidine (PEPCID) 20 MG tablet Take 1 tablet by mouth nightly at bedtime., Disp-30 tablet, R-0Normal             KAL Dumas CNP, Kelly Ann, APRN - CNP  09/24/24 8947

## 2024-09-24 ENCOUNTER — OFFICE VISIT (OUTPATIENT)
Dept: BARIATRICS/WEIGHT MGMT | Age: 24
End: 2024-09-24

## 2024-09-24 VITALS — HEIGHT: 66 IN | BODY MASS INDEX: 47.09 KG/M2 | WEIGHT: 293 LBS

## 2024-09-24 ASSESSMENT — ENCOUNTER SYMPTOMS: ABDOMINAL PAIN: 1

## 2024-09-26 ENCOUNTER — APPOINTMENT (OUTPATIENT)
Dept: GENERAL RADIOLOGY | Age: 24
End: 2024-09-26
Payer: COMMERCIAL

## 2024-09-26 ENCOUNTER — HOSPITAL ENCOUNTER (EMERGENCY)
Age: 24
Discharge: HOME | End: 2024-09-26
Payer: COMMERCIAL

## 2024-09-26 VITALS
HEART RATE: 80 BPM | SYSTOLIC BLOOD PRESSURE: 134 MMHG | OXYGEN SATURATION: 98 % | TEMPERATURE: 97.7 F | RESPIRATION RATE: 20 BRPM | DIASTOLIC BLOOD PRESSURE: 110 MMHG

## 2024-09-26 DIAGNOSIS — M25.562 CHRONIC PAIN OF LEFT KNEE: Primary | ICD-10-CM

## 2024-09-26 DIAGNOSIS — M76.52 PATELLAR TENDINITIS OF LEFT KNEE: ICD-10-CM

## 2024-09-26 DIAGNOSIS — G89.29 CHRONIC PAIN OF LEFT KNEE: Primary | ICD-10-CM

## 2024-09-26 DIAGNOSIS — R03.0 ELEVATED BLOOD PRESSURE READING: ICD-10-CM

## 2024-09-26 PROCEDURE — 99283 EMERGENCY DEPT VISIT LOW MDM: CPT

## 2024-09-26 PROCEDURE — 73564 X-RAY EXAM KNEE 4 OR MORE: CPT

## 2024-10-15 NOTE — PROGRESS NOTES
Assessment: Patient is a 24 y.o. female seen for   month one  follow up MNT visit for  medically supervised weight loss    Vitals from current and previous visits:   10/17/2024  10:05 AM   Vitals    DIASTOLIC    Site    Position    Cuff Size    Pulse    Temp    Respirations    SpO2    Weight - Scale 375 lb 3.2 oz (H)    Height 5' 6\"    Body Mass Index 60.56 kg/m2 (H)    Pain Level    Waist (Inches)    Neck (Inches)           Initial weight at start of Weight Management Program was: 379 lbs     Carl lost 4 lbs from initial visit with PA  -Weight goal: lose weight.     -Nutritionally relevant labs:   Lab Results   Component Value Date/Time    GLUCOSE 70 04/24/2024 05:45 PM    GLUCOSE 94 02/20/2024 02:27 AM    CHOL 182 12/22/2021 10:55 AM    HDL 48 12/22/2021 10:55 AM    TRIG 74 12/22/2021 10:55 AM                  Lab Results   Component Value Date/Time    VITD25 17.0 12/22/2021 10:55 AM     Work and Sleep Schedule: 9 1/2 hour shifts 3:30p-1am Monday-Saturday.  Right now working seven days a week  Goes to bed 2:30am- and up by 7:30am and stays up  Currently has [facility]. [facility]    - Is patient taking daily Multivitamin:  Does not take a MVI    -Food Recall:   Didn't bring food journal today  Verbal recall taken  Breakfast: no.   Is having a yogurt or string cheese before comes to [employer] but states other days is difficult to eat something  Snack: no.   Lunch: yes. 12p-1pm- mac and cheese and added tuna packet  Snack: no  Dinner: yes. 6pm- continues making lunch for work unless running behind then doesn't bring any food-grilled   chicken, broccoli and rice when cooks.  Will get bag of chips from vending if didn't bring lunch  Snack: yes. 9pm - break time 30 minutes (sometimes will eat leftovers from first break) and 11:30pm- 20 minute break- doesn't eat  Doesn't eat when gets off work- stopped getting fast food   Drinks throughout the day: switched to gatorade zero instead of regular gatorade  32 oz

## 2024-10-17 ENCOUNTER — OFFICE VISIT (OUTPATIENT)
Dept: BARIATRICS/WEIGHT MGMT | Age: 24
End: 2024-10-17

## 2024-10-17 ENCOUNTER — HOSPITAL ENCOUNTER (EMERGENCY)
Age: 24
Discharge: HOME | End: 2024-10-17
Payer: COMMERCIAL

## 2024-10-17 VITALS
WEIGHT: 293 LBS | HEIGHT: 66 IN | SYSTOLIC BLOOD PRESSURE: 122 MMHG | RESPIRATION RATE: 16 BRPM | HEART RATE: 76 BPM | OXYGEN SATURATION: 98 % | DIASTOLIC BLOOD PRESSURE: 85 MMHG | TEMPERATURE: 97.7 F | BODY MASS INDEX: 47.09 KG/M2

## 2024-10-17 VITALS — WEIGHT: 293 LBS | BODY MASS INDEX: 47.09 KG/M2 | HEIGHT: 66 IN

## 2024-10-17 DIAGNOSIS — G89.29 CHRONIC PAIN OF LEFT KNEE: Primary | ICD-10-CM

## 2024-10-17 DIAGNOSIS — M25.562 CHRONIC PAIN OF LEFT KNEE: Primary | ICD-10-CM

## 2024-10-17 DIAGNOSIS — Z02.89 ENCOUNTER TO OBTAIN EXCUSE FROM WORK: ICD-10-CM

## 2024-10-17 PROCEDURE — 99213 OFFICE O/P EST LOW 20 MIN: CPT

## 2024-10-17 PROCEDURE — 99212 OFFICE O/P EST SF 10 MIN: CPT

## 2024-10-17 RX ORDER — PREDNISONE 50 MG/1
50 TABLET ORAL
COMMUNITY
Start: 2024-10-10

## 2024-10-17 ASSESSMENT — PAIN DESCRIPTION - PAIN TYPE: TYPE: CHRONIC PAIN

## 2024-10-17 ASSESSMENT — ENCOUNTER SYMPTOMS
VOMITING: 0
SORE THROAT: 0
COUGH: 0
EYE REDNESS: 0
EYE DISCHARGE: 0
TROUBLE SWALLOWING: 0
NAUSEA: 0
SHORTNESS OF BREATH: 0
DIARRHEA: 0
RHINORRHEA: 0

## 2024-10-17 ASSESSMENT — PAIN - FUNCTIONAL ASSESSMENT
PAIN_FUNCTIONAL_ASSESSMENT: 0-10
PAIN_FUNCTIONAL_ASSESSMENT: PREVENTS OR INTERFERES SOME ACTIVE ACTIVITIES AND ADLS

## 2024-10-17 ASSESSMENT — PAIN DESCRIPTION - DESCRIPTORS: DESCRIPTORS: ACHING

## 2024-10-17 ASSESSMENT — PAIN SCALES - GENERAL: PAINLEVEL_OUTOF10: 8

## 2024-10-17 ASSESSMENT — PAIN DESCRIPTION - ORIENTATION: ORIENTATION: LEFT

## 2024-10-17 ASSESSMENT — PAIN DESCRIPTION - FREQUENCY: FREQUENCY: INTERMITTENT

## 2024-10-17 ASSESSMENT — PAIN DESCRIPTION - LOCATION: LOCATION: KNEE

## 2024-10-17 NOTE — ED PROVIDER NOTES
[facility] URGENT CARE  Urgent Care Encounter      CHIEF COMPLAINT       Chief Complaint   Patient presents with    left knee pain    work note       Nurses Notes reviewed and I agree except as noted in the HPI.  HISTORY OF PRESENT ILLNESS   Carl Fuchs is a 24 y.o. female who presents urgent care for evaluation of left knee pain and obtain a work excuse note.  Patient reports she has bilateral knee pain but tonight her left knee was really bothersome at work.  Reports she is currently working with [facility] of [facility] in regards to her bilateral knee pain.  Reports she is currently on oral steroids.  Reports that she is to contact them tomorrow in regards to scheduling her MRI.  Rating left knee pain 8 out of 10.    REVIEW OF SYSTEMS     Review of Systems   Constitutional:  Negative for chills, diaphoresis, fatigue and fever.   HENT:  Negative for congestion, ear pain, rhinorrhea, sore throat and trouble swallowing.    Eyes:  Negative for discharge and redness.   Respiratory:  Negative for cough and shortness of breath.    Cardiovascular:  Negative for chest pain.   Gastrointestinal:  Negative for diarrhea, nausea and vomiting.   Genitourinary:  Negative for decreased urine volume.   Musculoskeletal:  Positive for arthralgias. Negative for neck pain and neck stiffness.        Left knee   Skin:  Negative for rash.   Neurological:  Negative for headaches.   Hematological:  Negative for adenopathy.   Psychiatric/Behavioral:  Negative for sleep disturbance.        PAST MEDICAL HISTORY         Diagnosis Date    Gallstones     Liver disease     Polycystic ovary disease        SURGICAL HISTORY     Patient  has a past surgical history that includes Tonsillectomy and adenoidectomy; cyst removal (Left); Cholecystectomy, laparoscopic (N/A, 1/30/2023); Colonoscopy (N/A, 12/26/2023); and Upper gastrointestinal endoscopy (N/A, 2/9/2024).    CURRENT MEDICATIONS       Previous Medications

## 2024-10-17 NOTE — DISCHARGE INSTRUCTIONS
Continue to take your oral prednisone provided by [facility].    Recommend taking ibuprofen 600 mg every 6 hours as needed for pain.  You may also take Tylenol between doses for pain relief.    Rest, ice    Follow-up with orthopedic [facility] if symptoms worsen or fail to improve.

## 2024-10-17 NOTE — ED TRIAGE NOTES
Patient to [facility] for left knee pain. States she has been having pain x 3 months. States she is on prednisone for inflammation and has Mri scheduled. Slim brown work note.

## 2024-10-17 NOTE — PATIENT INSTRUCTIONS
Goals:  Nutrition Goal:  I will use my food journal to record meal times, serving sizes and bring back to next dietitian visit  Suggested eating times: 8:30am-9am- yogurt and fruit  11:30am- lunch  6pm- dinner meal  9:00p- small snack  Water Goal:  Continue at least 64 oz of water or greater each day- good job with this  Continue to use [employer] at least two days a week

## 2024-10-21 ENCOUNTER — HOSPITAL ENCOUNTER (EMERGENCY)
Age: 24
Discharge: HOME | End: 2024-10-21
Payer: COMMERCIAL

## 2024-10-21 VITALS
BODY MASS INDEX: 48.82 KG/M2 | OXYGEN SATURATION: 97 % | WEIGHT: 293 LBS | TEMPERATURE: 98.3 F | HEIGHT: 65 IN | RESPIRATION RATE: 16 BRPM | HEART RATE: 81 BPM

## 2024-10-21 DIAGNOSIS — M25.562 CHRONIC PAIN OF LEFT KNEE: Primary | ICD-10-CM

## 2024-10-21 DIAGNOSIS — G89.29 CHRONIC PAIN OF LEFT KNEE: Primary | ICD-10-CM

## 2024-10-21 PROCEDURE — 99213 OFFICE O/P EST LOW 20 MIN: CPT

## 2024-10-21 ASSESSMENT — ENCOUNTER SYMPTOMS
EYE PAIN: 0
TROUBLE SWALLOWING: 0
NAUSEA: 0
RHINORRHEA: 0
SHORTNESS OF BREATH: 0
SINUS PAIN: 0
DIARRHEA: 0
ABDOMINAL PAIN: 0
PHOTOPHOBIA: 0
SORE THROAT: 0
BACK PAIN: 0
COUGH: 0
VOMITING: 0
CONSTIPATION: 0
CHEST TIGHTNESS: 0
APNEA: 0
WHEEZING: 0

## 2024-10-21 ASSESSMENT — PAIN - FUNCTIONAL ASSESSMENT: PAIN_FUNCTIONAL_ASSESSMENT: 0-10

## 2024-10-21 ASSESSMENT — PAIN SCALES - GENERAL: PAINLEVEL_OUTOF10: 8

## 2024-10-21 NOTE — ED TRIAGE NOTES
Patient to ER due to having left knee pain. Patient was seen here last week for knee pain. Patient went to [place] for an xray. They said they think she has soft tissue damage per patient.Patient has MRI scheduled for 12th. Patient had to leave work and has a paper for us to fill out.

## 2024-10-21 NOTE — DISCHARGE INSTRUCTIONS
You can use Ibuprofen for swelling and inflammation.  You can take Tylenol as needed for pain.    I have attached RICE therapy education.  Ensure that you are elevating the extremity when at rest.  Using compression wrap or brace will help with swelling as well.   Continue to monitor symptoms.  If no improvement, I recommend you follow-up with [facility] for further evaluation.

## 2024-11-07 ENCOUNTER — OFFICE VISIT (OUTPATIENT)
Dept: BARIATRICS/WEIGHT MGMT | Age: 24
End: 2024-11-07
Payer: COMMERCIAL

## 2024-11-07 VITALS
SYSTOLIC BLOOD PRESSURE: 124 MMHG | BODY MASS INDEX: 48.82 KG/M2 | WEIGHT: 293 LBS | DIASTOLIC BLOOD PRESSURE: 86 MMHG | HEIGHT: 65 IN | HEART RATE: 71 BPM | TEMPERATURE: 97.3 F

## 2024-11-07 DIAGNOSIS — K76.0 FATTY LIVER: ICD-10-CM

## 2024-11-07 DIAGNOSIS — K21.9 GASTROESOPHAGEAL REFLUX DISEASE, UNSPECIFIED WHETHER ESOPHAGITIS PRESENT: ICD-10-CM

## 2024-11-07 PROCEDURE — 99214 OFFICE O/P EST MOD 30 MIN: CPT | Performed by: PHYSICIAN ASSISTANT

## 2024-11-07 RX ORDER — CLOBETASOL PROPIONATE 0.5 MG/G
OINTMENT TOPICAL
COMMUNITY
Start: 2024-11-05

## 2024-11-07 RX ORDER — SEMAGLUTIDE 0.25 MG/.5ML
INJECTION, SOLUTION SUBCUTANEOUS
COMMUNITY
Start: 2024-11-01

## 2024-11-07 NOTE — PATIENT INSTRUCTIONS
Behavior modification discussed in detail in regards to dietary habits.  Nutritional education occurred during visit. Continue following recommendations  per dietitian.  Improvement in fitness/exercise discussed with patient and the need for this  with/without surgery.  Psychology evaluation with Dr. Javier CLAYTON prior to any surgical intervention.    Encouraged to attend support groups.  Goal to lose 1-2# per week  Seca scale completed and reviewed  Encouraged 6 mini-meals with 10-15 grams of protein each times.  Planning to continue Wegovy through PCP.

## 2024-11-07 NOTE — PROGRESS NOTES
[facility]  [address], [address]  [place] [address]  [phone]      Visit Date:  11/7/2024  Weight Management Pre-Op Follow-up    HPI:    Medically Supervised follow-up- Month #1 of 3- medical supervision- planning surgery eventually.    Carl Fuchs is here today for continued supervised weight management of morbid obesity.  Reports that she started Wegovy through PCP earlier this week. Has not had any side effects. No nausea. No vomiting. No worsening GERD. Bowels regular.   Has notice decreased appetite since starting medication. Weight today 369#. Down 8# since last appointment. BMI 61.   She reports that she is working on the behavior changes discussed at her initial appointment. Has been tracking food intake, just not consistent. Typically eating twice daily.  Has been skipping breakfast.  Has been trying to get protein when eating. Discussed importance on eating more frequently.  Encouraged 6 mini-meals with 10-15 grams of protein each times.  Rarely eating sweets/junk food.  Trying to decrease frequency of eating out.  Not eating past 9pm.  Working 330pm-1am.  Typically sleeping 2am-730-8am. Drinking at least 64-96oz of water daily. Occasional pop or juice. Long discussion on importance of lifestyle changes, making better decisions with nutrition and increasing dedicated activity to be successful lifelong with weight loss with or without bariatric surgery. Comorbid conditions include GERD and fatty liver.  If feeling any heartburn will drink a glass of milk. No current medications for GERD.    Seca scale completed and reviewed.     Physical Activity:  Active at work. No dedicated activity outside of work.  Plans to get started at the [employer] [employer].       Current BMI: Body mass index is 61.44 kg/m².  Current Weight:   Wt Readings from Last 3 Encounters:   11/07/24 (!) 167.5 kg (369 lb 3.2 oz)   10/21/24 (!) 171 kg (377 lb)   10/17/24 (!) 169.6 kg (374 lb)

## 2024-11-08 ENCOUNTER — HOSPITAL ENCOUNTER (EMERGENCY)
Age: 24
Discharge: HOME | End: 2024-11-08
Payer: COMMERCIAL

## 2024-11-08 ENCOUNTER — APPOINTMENT (OUTPATIENT)
Dept: INTERVENTIONAL RADIOLOGY/VASCULAR | Age: 24
End: 2024-11-08
Payer: COMMERCIAL

## 2024-11-08 VITALS
HEART RATE: 82 BPM | SYSTOLIC BLOOD PRESSURE: 129 MMHG | TEMPERATURE: 98.2 F | OXYGEN SATURATION: 97 % | DIASTOLIC BLOOD PRESSURE: 95 MMHG | RESPIRATION RATE: 20 BRPM

## 2024-11-08 DIAGNOSIS — M79.604 RIGHT LEG PAIN: Primary | ICD-10-CM

## 2024-11-08 PROCEDURE — 99284 EMERGENCY DEPT VISIT MOD MDM: CPT

## 2024-11-08 PROCEDURE — 93971 EXTREMITY STUDY: CPT

## 2024-11-08 ASSESSMENT — PAIN - FUNCTIONAL ASSESSMENT: PAIN_FUNCTIONAL_ASSESSMENT: 0-10

## 2024-11-08 ASSESSMENT — PAIN DESCRIPTION - ORIENTATION: ORIENTATION: RIGHT

## 2024-11-08 ASSESSMENT — PAIN DESCRIPTION - LOCATION: LOCATION: KNEE

## 2024-11-08 ASSESSMENT — PAIN SCALES - GENERAL: PAINLEVEL_OUTOF10: 8

## 2024-11-08 NOTE — ED PROVIDER NOTES
[facility] EMERGENCY DEPT      EMERGENCY MEDICINE     Pt Name: Carl Fuchs  MRN: [id number]  Birthdate 2000  Date of evaluation: 11/8/2024  Provider: Ryann Niño PA-C    CHIEF COMPLAINT       Chief Complaint   Patient presents with    Leg Pain     right     HISTORY OF PRESENT ILLNESS   Carl Fuchs is a pleasant 24 y.o. female who presents to the emergency department from from home, by private vehicle for evaluation of leg pain since Wednesday. Pt reports pain is located in anteromedial right lower leg and radiates up to her hip. Pt denies aggravating factors. Pt attempted ibuprofen for relief with mild improvement. Pt states the area is swollen. Pt denies falls/injuries. Pt denies recent travel, surgeries, or immobilization. Pt reports her mother has a hx of blood clots. Pt denies any personal cardiopulmonary history. Pt denies chest pain, SOB, palpitations, erythema, decreased rom, numbness, tingling, weakness, fevers, chills, nausea, vomiting.     PASTMEDICAL HISTORY     Past Medical History:   Diagnosis Date    Gallstones     Liver disease     Polycystic ovary disease        Patient Active Problem List   Diagnosis Code    Obesity E66.9    Pre-diabetes R73.03    Bleeding R58    Vaginal spotting N93.9    Fall at home, initial encounter W19.XXXA, Y92.009    Pregnancy complication, antepartum O26.90    Pregnancy complication before birth O99.891    Vaginal delivery O80    Acute cholecystitis K81.0     SURGICAL HISTORY       Past Surgical History:   Procedure Laterality Date    CHOLECYSTECTOMY, LAPAROSCOPIC N/A 1/30/2023    Laparoscopic Cholecystectomy performed by Harris Jacques MD at [facility] OR    COLONOSCOPY N/A 12/26/2023    COLONOSCOPY WITH BIOPSY performed by Marci Christiansen MD at [facility] ENDOSCOPY    CYST REMOVAL Left     wrist    TONSILLECTOMY AND ADENOIDECTOMY      UPPER GASTROINTESTINAL ENDOSCOPY N/A 2/9/2024    EGD BIOPSY performed by Marci Christiansen MD at [facility] ENDOSCOPY       CURRENT

## 2024-11-12 ENCOUNTER — HOSPITAL ENCOUNTER (EMERGENCY)
Age: 24
Discharge: HOME | End: 2024-11-13
Payer: COMMERCIAL

## 2024-11-12 ENCOUNTER — HOSPITAL ENCOUNTER (OUTPATIENT)
Dept: MRI IMAGING | Age: 24
Discharge: HOME | End: 2024-11-12
Payer: COMMERCIAL

## 2024-11-12 ENCOUNTER — APPOINTMENT (OUTPATIENT)
Dept: CT IMAGING | Age: 24
End: 2024-11-12
Payer: COMMERCIAL

## 2024-11-12 VITALS
OXYGEN SATURATION: 98 % | BODY MASS INDEX: 48.82 KG/M2 | HEIGHT: 65 IN | DIASTOLIC BLOOD PRESSURE: 86 MMHG | RESPIRATION RATE: 19 BRPM | SYSTOLIC BLOOD PRESSURE: 117 MMHG | TEMPERATURE: 97.5 F | WEIGHT: 293 LBS | HEART RATE: 88 BPM

## 2024-11-12 DIAGNOSIS — K42.9 UMBILICAL HERNIA WITHOUT OBSTRUCTION AND WITHOUT GANGRENE: ICD-10-CM

## 2024-11-12 DIAGNOSIS — M23.92 INTERNAL DERANGEMENT OF LEFT KNEE: ICD-10-CM

## 2024-11-12 DIAGNOSIS — M79.3 PANNICULITIS: Primary | ICD-10-CM

## 2024-11-12 LAB
ALBUMIN SERPL BCG-MCNC: 4.1 G/DL (ref 3.5–5.1)
ALP SERPL-CCNC: 45 U/L (ref 38–126)
ALT SERPL W/O P-5'-P-CCNC: 13 U/L (ref 11–66)
ANION GAP SERPL CALC-SCNC: 14 MEQ/L (ref 8–16)
AST SERPL-CCNC: 16 U/L (ref 5–40)
B-HCG SERPL QL: NEGATIVE
BASOPHILS ABSOLUTE: 0 THOU/MM3 (ref 0–0.1)
BASOPHILS NFR BLD AUTO: 0.4 %
BILIRUB SERPL-MCNC: 0.2 MG/DL (ref 0.3–1.2)
BUN SERPL-MCNC: 12 MG/DL (ref 7–22)
CALCIUM SERPL-MCNC: 9.5 MG/DL (ref 8.5–10.5)
CHLORIDE SERPL-SCNC: 107 MEQ/L (ref 98–111)
CO2 SERPL-SCNC: 22 MEQ/L (ref 23–33)
CREAT SERPL-MCNC: 0.8 MG/DL (ref 0.4–1.2)
DEPRECATED RDW RBC AUTO: 39.7 FL (ref 35–45)
EOSINOPHIL NFR BLD AUTO: 0.7 %
EOSINOPHILS ABSOLUTE: 0.1 THOU/MM3 (ref 0–0.4)
ERYTHROCYTE [DISTWIDTH] IN BLOOD BY AUTOMATED COUNT: 12 % (ref 11.5–14.5)
GFR SERPL CREATININE-BSD FRML MDRD: > 90 ML/MIN/1.73M2
GLUCOSE SERPL-MCNC: 78 MG/DL (ref 70–108)
HCT VFR BLD AUTO: 42.3 % (ref 37–47)
HGB BLD-MCNC: 13.4 GM/DL (ref 12–16)
IMM GRANULOCYTES # BLD AUTO: 0.02 THOU/MM3 (ref 0–0.07)
IMM GRANULOCYTES NFR BLD AUTO: 0.2 %
LYMPHOCYTES ABSOLUTE: 4.6 THOU/MM3 (ref 1–4.8)
LYMPHOCYTES NFR BLD AUTO: 42.6 %
MCH RBC QN AUTO: 28.5 PG (ref 26–33)
MCHC RBC AUTO-ENTMCNC: 31.7 GM/DL (ref 32.2–35.5)
MCV RBC AUTO: 89.8 FL (ref 81–99)
MONOCYTES ABSOLUTE: 0.7 THOU/MM3 (ref 0.4–1.3)
MONOCYTES NFR BLD AUTO: 6.4 %
NEUTROPHILS ABSOLUTE: 5.3 THOU/MM3 (ref 1.8–7.7)
NEUTROPHILS NFR BLD AUTO: 49.7 %
NRBC BLD AUTO-RTO: 0 /100 WBC
OSMOLALITY SERPL CALC.SUM OF ELEC: 283.6 MOSMOL/KG (ref 275–300)
PLATELET # BLD AUTO: 350 THOU/MM3 (ref 130–400)
PMV BLD AUTO: 10 FL (ref 9.4–12.4)
POTASSIUM SERPL-SCNC: 4 MEQ/L (ref 3.5–5.2)
PROT SERPL-MCNC: 7.3 G/DL (ref 6.1–8)
RBC # BLD AUTO: 4.71 MILL/MM3 (ref 4.2–5.4)
SODIUM SERPL-SCNC: 143 MEQ/L (ref 135–145)
WBC # BLD AUTO: 10.7 THOU/MM3 (ref 4.8–10.8)

## 2024-11-12 PROCEDURE — 99285 EMERGENCY DEPT VISIT HI MDM: CPT

## 2024-11-12 PROCEDURE — 6360000004 HC RX CONTRAST MEDICATION: Performed by: NURSE PRACTITIONER

## 2024-11-12 PROCEDURE — 84703 CHORIONIC GONADOTROPIN ASSAY: CPT

## 2024-11-12 PROCEDURE — 85025 COMPLETE CBC W/AUTO DIFF WBC: CPT

## 2024-11-12 PROCEDURE — 80053 COMPREHEN METABOLIC PANEL: CPT

## 2024-11-12 PROCEDURE — 73721 MRI JNT OF LWR EXTRE W/O DYE: CPT

## 2024-11-12 PROCEDURE — 74177 CT ABD & PELVIS W/CONTRAST: CPT

## 2024-11-12 PROCEDURE — 36415 COLL VENOUS BLD VENIPUNCTURE: CPT

## 2024-11-12 RX ORDER — IOPAMIDOL 755 MG/ML
80 INJECTION, SOLUTION INTRAVASCULAR
Status: COMPLETED | OUTPATIENT
Start: 2024-11-12 | End: 2024-11-12

## 2024-11-12 RX ADMIN — IOPAMIDOL 80 ML: 755 INJECTION, SOLUTION INTRAVENOUS at 22:43

## 2024-11-12 ASSESSMENT — PAIN - FUNCTIONAL ASSESSMENT: PAIN_FUNCTIONAL_ASSESSMENT: NONE - DENIES PAIN

## 2024-11-13 RX ORDER — NAPROXEN 500 MG/1
500 TABLET ORAL 2 TIMES DAILY PRN
Qty: 30 TABLET | Refills: 0 | Status: SHIPPED | OUTPATIENT
Start: 2024-11-13 | End: 2024-11-28

## 2024-11-13 NOTE — DISCHARGE INSTRUCTIONS
Your CT scan shows a localized area of inflammation under the skin consistent with panniculitis.  Use the anti-inflammatories for not only pain but to reduce the inflammation.  Continue to follow-up with your PCP and return if symptoms worsen

## 2024-11-13 NOTE — ED TRIAGE NOTES
Pt present to ed from home with c/c of abdominal pain. Pt states she has had sharp pain in the URQ for about 3 days. Pt has not taken anything for pain. Pt states she felt a knot today. Rr easy and unlabored. No distress noted.

## 2024-11-16 NOTE — ED PROVIDER NOTES
Plan:labs, CT    1) Number and Complexity of Problems                    Differential Diagnosis includes (but not limited to):  Hernia, lipoma, muscle strain, UTI among others                   Pertinent Comorbid Conditions:    Reviewed per the chart    2)  Data Reviewed (none if left blank, additional information can be found in the ED course)          My Independent interpretations:     EKG:           Imaging: no obstructive or inflammatory changes to GI or  tracts.  Small fat containing umbilical hernia.      Labs:      unremarkable                   Decision Rules/Clinical Scores utilized:                        Previous visit summary and patient history available on EMR and was reviewed.     History obtained from chart review and the patient.     Pertinent previous records reviewed:  previous notes, admissions and hospitalizations .    Code Status: Not addressed at time of initial evaluation             See Formal Diagnostic Results above for the lab and radiology tests and orders.         3)  Treatment and Disposition         ED Reassessment/Response to interventions:  stable     NA         Case discussed with consulting clinician/attending physician:  None/None         Shared Decision-Making was performed and disposition discussed with the       Patient/Family and questions answered          Social determinants of health impacting treatment or disposition:     4) MIPS  N/A                    Medical Decision Making  The patient was seen in the ER for pain in the right upper quadrant.  Appropriate labs and imaging were ordered and reviewed.  Labs are reassuring.  CT scan notes a fat containing umbilical hernia.  Patient is advised on home care.  The patient is instructed on using anti-inflammatories to improve the symptoms.  Patient is agreeable.  Dc home.        Vitals Reviewed:    Vitals:    11/12/24 2033   BP: 117/86   Pulse: 88   Resp: 19   Temp: 97.5 °F (36.4 °C)   TempSrc: Oral   SpO2: 98%

## 2024-12-05 ENCOUNTER — TELEPHONE (OUTPATIENT)
Dept: BARIATRICS/WEIGHT MGMT | Age: 24
End: 2024-12-05

## 2024-12-05 NOTE — TELEPHONE ENCOUNTER
Carl calls to cancell her appt for today and reschedule.  Carl joined our program in 9-2024-she has an outstanding balance of $150 and has not made a payment since 9-2024. I spoke to her -once she pays her program fee balance in full -we will be happy to reschedule her in our office.  She asks if we take cash- \"yes we do\"  she will try to pay that soon.  I reminded her that the unpaid balance kathryn be sen to collections  if not paid in full. Pt voiced understanding.

## 2024-12-12 NOTE — FLOWSHEET NOTE
RN at bedside. RN offers pain medication for headache. Pt denies any need for pain relief at this time. Will updated Dr. Brandon Solares. Wounds

## 2025-01-06 NOTE — PROGRESS NOTES
Assessment: Patient is a 24 y.o. female seen for month two  follow up MNT visit for  medically supervised weight loss    Vitals from current and previous visits:      1/7/2025  10:40 AM   Vitals    DIASTOLIC    Site    Position    Cuff Size    Pulse    Temp    Respirations    SpO2    Weight - Scale 375 lb 3.2 oz (H)    Height 5' 6\"    Body Mass Index 60.56 kg/m2 (H)    Pain Level    Waist (Inches)    Neck (Inches)         Initial weight at start of Weight Management Program was: 379 lbs     Carl weight stable from last RD visit in October.  -Weight goal: lose weight.     -Nutritionally relevant labs:   Lab Results   Component Value Date/Time    GLUCOSE 78 11/12/2024 08:55 PM    GLUCOSE 70 04/24/2024 05:45 PM    CHOL 182 12/22/2021 10:55 AM    HDL 48 12/22/2021 10:55 AM    TRIG 74 12/22/2021 10:55 AM                  Lab Results   Component Value Date/Time    VITD25 17.0 12/22/2021 10:55 AM     Work and Sleep Schedule: right now 8 hour shifts 3:30p-11:30pm off on Saturday and Sunday     Goes to bed ~ 1-2 am and wakes up 7:40am and stays up  Currently has [facility]. Lives with Fiance    - Is patient taking daily Multivitamin:  Does not take a MVI    Today states started Wegovy on Tuesdays for the last two months. States today would have been .75mg increase but requires to f/u with PCP and next injection delayed.  Encouraged pt to stay as close to previous week injection or if goes too long recommend starting at lower dose to avoid GI side effects.  Denied today any nausea and reports bowels moving as previously moving    -Food Recall:   \"I eat when I feel hungry\"  Verbal recall taken and provided pt with new food journal    Breakfast: today stopped at  [place]- had sausage mcMuffin - usually doesn't eat breakfast  Snack:  Usually eats first time 10a-11am- having a string cheese stick  Lunch: no  Snack: no  Dinner: yes. 6pm- packing dinner for work - spaghetti (smaller portion)- fills up much quicker and states not

## 2025-01-07 ENCOUNTER — OFFICE VISIT (OUTPATIENT)
Dept: BARIATRICS/WEIGHT MGMT | Age: 25
End: 2025-01-07

## 2025-01-07 VITALS — HEIGHT: 66 IN | BODY MASS INDEX: 47.09 KG/M2 | WEIGHT: 293 LBS

## 2025-01-07 NOTE — PATIENT INSTRUCTIONS
Goals:  Nutrition Goal:  I will use my food journal to record meal times, serving sizes and bring back to next dietitian visit  Suggested eating times: 8:30am-9am-  11:30am- lunch  6pm- dinner meal  9:00p- small snack.  Suggest one 30 gram protein shake per day  Water Goal:  Continue at least 64 oz of water or greater each day- good job with this  Get back to using [employer] at least two days a week   5.  Start Equate Complete Multivitamin For Adults one pill a day ( can get at [facility])  6.  Make sure follow up with family doctor to stay on weekly injections as prescribed

## 2025-01-23 ENCOUNTER — APPOINTMENT (OUTPATIENT)
Dept: CT IMAGING | Age: 25
DRG: 389 | End: 2025-01-23
Payer: COMMERCIAL

## 2025-01-23 ENCOUNTER — HOSPITAL ENCOUNTER (INPATIENT)
Age: 25
LOS: 1 days | Discharge: HOME | DRG: 389 | End: 2025-01-25
Attending: INTERNAL MEDICINE | Admitting: INTERNAL MEDICINE
Payer: COMMERCIAL

## 2025-01-23 DIAGNOSIS — R10.9 ABDOMINAL PAIN, UNSPECIFIED ABDOMINAL LOCATION: Primary | ICD-10-CM

## 2025-01-23 PROBLEM — K56.7 ILEUS (HCC): Status: ACTIVE | Noted: 2025-01-23

## 2025-01-23 LAB
ALBUMIN SERPL BCG-MCNC: 4.2 G/DL (ref 3.5–5.1)
ALP SERPL-CCNC: 49 U/L (ref 38–126)
ALT SERPL W/O P-5'-P-CCNC: 15 U/L (ref 11–66)
ANION GAP SERPL CALC-SCNC: 10 MEQ/L (ref 8–16)
AST SERPL-CCNC: 14 U/L (ref 5–40)
B-HCG SERPL QL: NEGATIVE
BACTERIA URNS QL MICRO: ABNORMAL /HPF
BASOPHILS ABSOLUTE: 0 THOU/MM3 (ref 0–0.1)
BASOPHILS NFR BLD AUTO: 0.3 %
BILIRUB CONJ SERPL-MCNC: < 0.1 MG/DL (ref 0.1–13.8)
BILIRUB SERPL-MCNC: 0.3 MG/DL (ref 0.3–1.2)
BILIRUB UR QL STRIP.AUTO: NEGATIVE
BUN SERPL-MCNC: 12 MG/DL (ref 7–22)
CALCIUM SERPL-MCNC: 9.7 MG/DL (ref 8.5–10.5)
CASTS #/AREA URNS LPF: ABNORMAL /LPF
CASTS 2: ABNORMAL /LPF
CHARACTER UR: ABNORMAL
CHLORIDE SERPL-SCNC: 102 MEQ/L (ref 98–111)
CO2 SERPL-SCNC: 24 MEQ/L (ref 23–33)
COLOR, UA: YELLOW
CREAT SERPL-MCNC: 0.8 MG/DL (ref 0.4–1.2)
CRYSTALS URNS MICRO: ABNORMAL
DEPRECATED RDW RBC AUTO: 41.5 FL (ref 35–45)
EOSINOPHIL NFR BLD AUTO: 3.4 %
EOSINOPHILS ABSOLUTE: 0.4 THOU/MM3 (ref 0–0.4)
EPITHELIAL CELLS, UA: ABNORMAL /HPF
ERYTHROCYTE [DISTWIDTH] IN BLOOD BY AUTOMATED COUNT: 12.7 % (ref 11.5–14.5)
GFR SERPL CREATININE-BSD FRML MDRD: > 90 ML/MIN/1.73M2
GLUCOSE SERPL-MCNC: 91 MG/DL (ref 70–108)
GLUCOSE UR QL STRIP.AUTO: NEGATIVE MG/DL
HCT VFR BLD AUTO: 41.8 % (ref 37–47)
HGB BLD-MCNC: 13.5 GM/DL (ref 12–16)
HGB UR QL STRIP.AUTO: NEGATIVE
IMM GRANULOCYTES # BLD AUTO: 0.02 THOU/MM3 (ref 0–0.07)
IMM GRANULOCYTES NFR BLD AUTO: 0.2 %
KETONES UR QL STRIP.AUTO: NEGATIVE
LIPASE SERPL-CCNC: 13.4 U/L (ref 5.6–51.3)
LYMPHOCYTES ABSOLUTE: 2.8 THOU/MM3 (ref 1–4.8)
LYMPHOCYTES NFR BLD AUTO: 27 %
MCH RBC QN AUTO: 28.8 PG (ref 26–33)
MCHC RBC AUTO-ENTMCNC: 32.3 GM/DL (ref 32.2–35.5)
MCV RBC AUTO: 89.3 FL (ref 81–99)
MISCELLANEOUS 2: ABNORMAL
MONOCYTES ABSOLUTE: 0.8 THOU/MM3 (ref 0.4–1.3)
MONOCYTES NFR BLD AUTO: 7.6 %
NEUTROPHILS ABSOLUTE: 6.4 THOU/MM3 (ref 1.8–7.7)
NEUTROPHILS NFR BLD AUTO: 61.5 %
NITRITE UR QL STRIP: NEGATIVE
NRBC BLD AUTO-RTO: 0 /100 WBC
OSMOLALITY SERPL CALC.SUM OF ELEC: 271.3 MOSMOL/KG (ref 275–300)
PH UR STRIP.AUTO: 5.5 [PH] (ref 5–9)
PLATELET # BLD AUTO: 362 THOU/MM3 (ref 130–400)
PMV BLD AUTO: 10 FL (ref 9.4–12.4)
POTASSIUM SERPL-SCNC: 3.8 MEQ/L (ref 3.5–5.2)
PROT SERPL-MCNC: 7.5 G/DL (ref 6.1–8)
PROT UR STRIP.AUTO-MCNC: NEGATIVE MG/DL
RBC # BLD AUTO: 4.68 MILL/MM3 (ref 4.2–5.4)
RBC URINE: ABNORMAL /HPF
RENAL EPI CELLS #/AREA URNS HPF: ABNORMAL /[HPF]
SODIUM SERPL-SCNC: 136 MEQ/L (ref 135–145)
SP GR UR REFRACT.AUTO: 1.03 (ref 1–1.03)
UROBILINOGEN, URINE: 1 EU/DL (ref 0–1)
WBC # BLD AUTO: 10.4 THOU/MM3 (ref 4.8–10.8)
WBC #/AREA URNS HPF: ABNORMAL /HPF
WBC #/AREA URNS HPF: ABNORMAL /[HPF]
YEAST LIKE FUNGI URNS QL MICRO: ABNORMAL

## 2025-01-23 PROCEDURE — 96361 HYDRATE IV INFUSION ADD-ON: CPT

## 2025-01-23 PROCEDURE — 87086 URINE CULTURE/COLONY COUNT: CPT

## 2025-01-23 PROCEDURE — 6360000004 HC RX CONTRAST MEDICATION: Performed by: NURSE PRACTITIONER

## 2025-01-23 PROCEDURE — 2500000003 HC RX 250 WO HCPCS

## 2025-01-23 PROCEDURE — 2580000003 HC RX 258

## 2025-01-23 PROCEDURE — 82248 BILIRUBIN DIRECT: CPT

## 2025-01-23 PROCEDURE — 80053 COMPREHEN METABOLIC PANEL: CPT

## 2025-01-23 PROCEDURE — 96375 TX/PRO/DX INJ NEW DRUG ADDON: CPT

## 2025-01-23 PROCEDURE — 6360000002 HC RX W HCPCS

## 2025-01-23 PROCEDURE — 83690 ASSAY OF LIPASE: CPT

## 2025-01-23 PROCEDURE — 99285 EMERGENCY DEPT VISIT HI MDM: CPT

## 2025-01-23 PROCEDURE — 96376 TX/PRO/DX INJ SAME DRUG ADON: CPT

## 2025-01-23 PROCEDURE — 96372 THER/PROPH/DIAG INJ SC/IM: CPT

## 2025-01-23 PROCEDURE — 84703 CHORIONIC GONADOTROPIN ASSAY: CPT

## 2025-01-23 PROCEDURE — 6360000002 HC RX W HCPCS: Performed by: NURSE PRACTITIONER

## 2025-01-23 PROCEDURE — G0378 HOSPITAL OBSERVATION PER HR: HCPCS

## 2025-01-23 PROCEDURE — 81001 URINALYSIS AUTO W/SCOPE: CPT

## 2025-01-23 PROCEDURE — 2580000003 HC RX 258: Performed by: NURSE PRACTITIONER

## 2025-01-23 PROCEDURE — 99222 1ST HOSP IP/OBS MODERATE 55: CPT | Performed by: INTERNAL MEDICINE

## 2025-01-23 PROCEDURE — 74177 CT ABD & PELVIS W/CONTRAST: CPT

## 2025-01-23 PROCEDURE — 85025 COMPLETE CBC W/AUTO DIFF WBC: CPT

## 2025-01-23 PROCEDURE — 36415 COLL VENOUS BLD VENIPUNCTURE: CPT

## 2025-01-23 PROCEDURE — 96374 THER/PROPH/DIAG INJ IV PUSH: CPT

## 2025-01-23 RX ORDER — ONDANSETRON 2 MG/ML
4 INJECTION INTRAMUSCULAR; INTRAVENOUS ONCE
Status: COMPLETED | OUTPATIENT
Start: 2025-01-23 | End: 2025-01-23

## 2025-01-23 RX ORDER — ENOXAPARIN SODIUM 100 MG/ML
40 INJECTION SUBCUTANEOUS 2 TIMES DAILY
Status: DISCONTINUED | OUTPATIENT
Start: 2025-01-23 | End: 2025-01-25 | Stop reason: HOSPADM

## 2025-01-23 RX ORDER — SODIUM CHLORIDE 9 MG/ML
INJECTION, SOLUTION INTRAVENOUS PRN
Status: DISCONTINUED | OUTPATIENT
Start: 2025-01-23 | End: 2025-01-25 | Stop reason: HOSPADM

## 2025-01-23 RX ORDER — MORPHINE SULFATE 2 MG/ML
2 INJECTION, SOLUTION INTRAMUSCULAR; INTRAVENOUS EVERY 4 HOURS PRN
Status: DISCONTINUED | OUTPATIENT
Start: 2025-01-23 | End: 2025-01-25 | Stop reason: HOSPADM

## 2025-01-23 RX ORDER — POTASSIUM CHLORIDE 1500 MG/1
40 TABLET, EXTENDED RELEASE ORAL PRN
Status: DISCONTINUED | OUTPATIENT
Start: 2025-01-23 | End: 2025-01-25 | Stop reason: HOSPADM

## 2025-01-23 RX ORDER — 0.9 % SODIUM CHLORIDE 0.9 %
1000 INTRAVENOUS SOLUTION INTRAVENOUS ONCE
Status: COMPLETED | OUTPATIENT
Start: 2025-01-23 | End: 2025-01-23

## 2025-01-23 RX ORDER — MAGNESIUM SULFATE IN WATER 40 MG/ML
2000 INJECTION, SOLUTION INTRAVENOUS PRN
Status: DISCONTINUED | OUTPATIENT
Start: 2025-01-23 | End: 2025-01-25 | Stop reason: HOSPADM

## 2025-01-23 RX ORDER — SODIUM CHLORIDE 0.9 % (FLUSH) 0.9 %
5-40 SYRINGE (ML) INJECTION EVERY 12 HOURS SCHEDULED
Status: DISCONTINUED | OUTPATIENT
Start: 2025-01-23 | End: 2025-01-25 | Stop reason: HOSPADM

## 2025-01-23 RX ORDER — SODIUM CHLORIDE 0.9 % (FLUSH) 0.9 %
5-40 SYRINGE (ML) INJECTION PRN
Status: DISCONTINUED | OUTPATIENT
Start: 2025-01-23 | End: 2025-01-25 | Stop reason: HOSPADM

## 2025-01-23 RX ORDER — IOPAMIDOL 755 MG/ML
80 INJECTION, SOLUTION INTRAVASCULAR
Status: COMPLETED | OUTPATIENT
Start: 2025-01-23 | End: 2025-01-23

## 2025-01-23 RX ORDER — ONDANSETRON 4 MG/1
4 TABLET, ORALLY DISINTEGRATING ORAL EVERY 8 HOURS PRN
Status: DISCONTINUED | OUTPATIENT
Start: 2025-01-23 | End: 2025-01-25 | Stop reason: HOSPADM

## 2025-01-23 RX ORDER — SODIUM CHLORIDE 9 MG/ML
INJECTION, SOLUTION INTRAVENOUS CONTINUOUS
Status: ACTIVE | OUTPATIENT
Start: 2025-01-23 | End: 2025-01-24

## 2025-01-23 RX ORDER — ACETAMINOPHEN 325 MG/1
650 TABLET ORAL EVERY 6 HOURS PRN
Status: DISCONTINUED | OUTPATIENT
Start: 2025-01-23 | End: 2025-01-25 | Stop reason: HOSPADM

## 2025-01-23 RX ORDER — ACETAMINOPHEN 650 MG/1
650 SUPPOSITORY RECTAL EVERY 6 HOURS PRN
Status: DISCONTINUED | OUTPATIENT
Start: 2025-01-23 | End: 2025-01-25 | Stop reason: HOSPADM

## 2025-01-23 RX ORDER — POLYETHYLENE GLYCOL 3350 17 G/17G
17 POWDER, FOR SOLUTION ORAL DAILY PRN
Status: DISCONTINUED | OUTPATIENT
Start: 2025-01-23 | End: 2025-01-25 | Stop reason: HOSPADM

## 2025-01-23 RX ORDER — DIPHENHYDRAMINE HYDROCHLORIDE 50 MG/ML
50 INJECTION INTRAMUSCULAR; INTRAVENOUS ONCE
Status: COMPLETED | OUTPATIENT
Start: 2025-01-23 | End: 2025-01-23

## 2025-01-23 RX ORDER — PROCHLORPERAZINE EDISYLATE 5 MG/ML
10 INJECTION INTRAMUSCULAR; INTRAVENOUS ONCE
Status: COMPLETED | OUTPATIENT
Start: 2025-01-23 | End: 2025-01-23

## 2025-01-23 RX ORDER — POTASSIUM CHLORIDE 7.45 MG/ML
10 INJECTION INTRAVENOUS PRN
Status: DISCONTINUED | OUTPATIENT
Start: 2025-01-23 | End: 2025-01-25 | Stop reason: HOSPADM

## 2025-01-23 RX ORDER — ONDANSETRON 2 MG/ML
4 INJECTION INTRAMUSCULAR; INTRAVENOUS EVERY 6 HOURS PRN
Status: DISCONTINUED | OUTPATIENT
Start: 2025-01-23 | End: 2025-01-25 | Stop reason: HOSPADM

## 2025-01-23 RX ORDER — KETOROLAC TROMETHAMINE 30 MG/ML
15 INJECTION, SOLUTION INTRAMUSCULAR; INTRAVENOUS ONCE
Status: COMPLETED | OUTPATIENT
Start: 2025-01-23 | End: 2025-01-23

## 2025-01-23 RX ADMIN — FAMOTIDINE 20 MG: 10 INJECTION, SOLUTION INTRAVENOUS at 08:37

## 2025-01-23 RX ADMIN — ONDANSETRON 4 MG: 2 INJECTION INTRAMUSCULAR; INTRAVENOUS at 03:49

## 2025-01-23 RX ADMIN — ENOXAPARIN SODIUM 40 MG: 100 INJECTION SUBCUTANEOUS at 08:36

## 2025-01-23 RX ADMIN — SODIUM CHLORIDE: 9 INJECTION, SOLUTION INTRAVENOUS at 15:28

## 2025-01-23 RX ADMIN — KETOROLAC TROMETHAMINE 15 MG: 30 INJECTION, SOLUTION INTRAMUSCULAR at 03:50

## 2025-01-23 RX ADMIN — IOPAMIDOL 80 ML: 755 INJECTION, SOLUTION INTRAVENOUS at 04:37

## 2025-01-23 RX ADMIN — SODIUM CHLORIDE, PRESERVATIVE FREE 10 ML: 5 INJECTION INTRAVENOUS at 08:40

## 2025-01-23 RX ADMIN — SODIUM CHLORIDE, PRESERVATIVE FREE 10 ML: 5 INJECTION INTRAVENOUS at 21:17

## 2025-01-23 RX ADMIN — PROCHLORPERAZINE EDISYLATE 10 MG: 5 INJECTION INTRAMUSCULAR; INTRAVENOUS at 05:38

## 2025-01-23 RX ADMIN — DIPHENHYDRAMINE HYDROCHLORIDE 50 MG: 50 INJECTION INTRAMUSCULAR; INTRAVENOUS at 05:38

## 2025-01-23 RX ADMIN — SODIUM CHLORIDE 1000 ML: 9 INJECTION, SOLUTION INTRAVENOUS at 05:37

## 2025-01-23 RX ADMIN — ONDANSETRON 4 MG: 2 INJECTION INTRAMUSCULAR; INTRAVENOUS at 21:15

## 2025-01-23 RX ADMIN — MORPHINE SULFATE 2 MG: 2 INJECTION, SOLUTION INTRAMUSCULAR; INTRAVENOUS at 21:15

## 2025-01-23 ASSESSMENT — PAIN SCALES - GENERAL
PAINLEVEL_OUTOF10: 0
PAINLEVEL_OUTOF10: 10
PAINLEVEL_OUTOF10: 6
PAINLEVEL_OUTOF10: 7
PAINLEVEL_OUTOF10: 9
PAINLEVEL_OUTOF10: 1
PAINLEVEL_OUTOF10: 9
PAINLEVEL_OUTOF10: 7

## 2025-01-23 ASSESSMENT — PAIN - FUNCTIONAL ASSESSMENT
PAIN_FUNCTIONAL_ASSESSMENT: 0-10
PAIN_FUNCTIONAL_ASSESSMENT: 0-10
PAIN_FUNCTIONAL_ASSESSMENT: NONE - DENIES PAIN

## 2025-01-23 ASSESSMENT — PAIN DESCRIPTION - LOCATION
LOCATION: ABDOMEN
LOCATION: ABDOMEN

## 2025-01-23 ASSESSMENT — PAIN DESCRIPTION - ORIENTATION: ORIENTATION: MID

## 2025-01-23 NOTE — PLAN OF CARE
Problem: Discharge Planning  Goal: Discharge to home or other facility with appropriate resources  Outcome: Progressing  Flowsheets  Taken 1/23/2025 1435  Discharge to home or other facility with appropriate resources:   Identify barriers to discharge with patient and caregiver   Identify discharge learning needs (meds, wound care, etc)  Taken 1/23/2025 1430  Discharge to home or other facility with appropriate resources:   Identify barriers to discharge with patient and caregiver   Identify discharge learning needs (meds, wound care, etc)     Problem: Pain  Goal: Verbalizes/displays adequate comfort level or baseline comfort level  Outcome: Progressing

## 2025-01-23 NOTE — ED PROVIDER NOTES
[facility] EMERGENCY DEPARTMENT      EMERGENCY MEDICINE     Pt Name: Carl Fuchs  MRN: [id number]  Birthdate 2000  Date of evaluation: 1/23/2025  Provider: KAL Polo CNP    CHIEF COMPLAINT       Chief Complaint   Patient presents with    Abdominal Pain    Vomiting    Constipation     HISTORY OF PRESENT ILLNESS   Carl Fuchs is a pleasant 25 y.o. female who presents to the emergency department from home with c/o abdominal pain, emesis, and constipation. Patient states the abdominal pain started two weeks ago. Patient has a history of a hernia, and states that pain starts where the hernia is present and goes across the epigastric area. Pain also radiates down to the lower quadrants and feels like cramping and gets worse with movement. Pt complains of intermittent constipation and burps that taste like eggs. States pain is a 9/10. LMP was the end of December/early January.       History is obtained from:  patient  PASTMEDICAL HISTORY     Past Medical History:   Diagnosis Date    Gallstones     Liver disease     Polycystic ovary disease        Patient Active Problem List   Diagnosis Code    Obesity E66.9    Pre-diabetes R73.03    Bleeding R58    Vaginal spotting N93.9    Fall at home, initial encounter W19.XXXA, Y92.009    Pregnancy complication, antepartum O26.90    Pregnancy complication before birth O99.891    Vaginal delivery O80    Acute cholecystitis K81.0     SURGICAL HISTORY       Past Surgical History:   Procedure Laterality Date    CHOLECYSTECTOMY, LAPAROSCOPIC N/A 1/30/2023    Laparoscopic Cholecystectomy performed by Harris Jacques MD at [facility] OR    COLONOSCOPY N/A 12/26/2023    COLONOSCOPY WITH BIOPSY performed by Marci Christiansen MD at [facility] ENDOSCOPY    CYST REMOVAL Left     wrist    TONSILLECTOMY AND ADENOIDECTOMY      UPPER GASTROINTESTINAL ENDOSCOPY N/A 2/9/2024    EGD BIOPSY performed by Marci Christiansen MD at [facility] ENDOSCOPY       CURRENT MEDICATIONS       Previous

## 2025-01-23 NOTE — ED NOTES
Pt transported to [facility] in stable condition. Spoke to Kimberly prior to arrival.

## 2025-01-23 NOTE — ED NOTES
Pt ambulated to restroom with no assistance. Steady gait. Updated on plan of care. Voiced no needs. Call light in reach.

## 2025-01-23 NOTE — H&P
dizziness or any other new or worsening symptoms at this time.  Admits to being a former smoker but denies drug use.        ROS: reviewed complete ROS unchanged unless otherwise stated in hospital course/subjective portion.       Medications:  Reviewed    sodium chloride flush, 5-40 mL, IntraVENous, 2 times per day    enoxaparin, 40 mg, SubCUTAneous, BID         Intake/Output Summary (Last 24 hours) at 1/23/2025 1309  Last data filed at 1/23/2025 0400  Gross per 24 hour   Intake --   Output 50 ml   Net -50 ml       Exam:  BP (!) 125/93   Pulse 90   Temp 98 °F (36.7 °C) (Oral)   Resp 17   Ht 1.651 m (5' 5\")   Wt (!) 165.1 kg (364 lb)   LMP 12/31/2024   SpO2 98%   BMI 60.57 kg/m²     General: No distress, appears stated age. Morbidly obese.   Eyes:  PERRL. Conjunctivae/corneas clear.  HENT: Head normal appearing. Nares normal. Oral mucosa moist.  Hearing intact.   Neck: Supple, with full range of motion. Trachea midline.  No gross JVD appreciated.  Respiratory:  Normal effort. Clear to auscultation, without rales or wheezes or rhonchi.  Cardiovascular: Normal rate, regular rhythm with normal S1/S2 without murmurs.    No lower extremity edema.   Abdomen: Soft, non-distended with normal bowel sounds. Diffuse upper abd TTP.   Musculoskeletal: No joint swelling or tenderness. Normal tone. No abnormal movements.   Skin: Warm and dry. No rashes or lesions.  Neurologic:  No focal sensory/motor deficits in the upper or lower extremities. Cranial nerves:  grossly non-focal 2-12.     Psychiatric: Alert and oriented, normal insight and thought content.   Capillary Refill: Brisk,< 3 seconds.  Peripheral Pulses: +2 palpable, equal bilaterally.       Labs:   Recent Labs     01/23/25  0320   WBC 10.4   HGB 13.5   HCT 41.8        Recent Labs     01/23/25  0320      K 3.8      CO2 24   BUN 12   CREATININE 0.8   CALCIUM 9.7     Recent Labs     01/23/25  0320   AST 14   ALT 15   BILIDIR <0.1   BILITOT 0.3  needed?): [x]Yes / []No  Level of care: []Step Down / [x]Med-Surg  Bed Status: []Inpatient / [x]Observation  Telemetry: []Yes / [x]No   PT/OT: []Yes / [x]No  DVT Prophylaxis: [x] Lovenox / [] Heparin / [] SCDs / [] Already on Systemic Anticoagulation / [] None   Diet: Diet NPO  Code Status: Full Code      Electronically signed by Molly Fernando MD on 1/23/2025 at 1:09 PM    Case was discussed with Attending, Dr. Walters

## 2025-01-23 NOTE — ED TRIAGE NOTES
Pt arrives to ED from home for c/o abdominal pain, emesis, and constipation. Pt states symptoms began 2 weeks ago. Pt states she does have a known hernia in the area where she is feeling pain. Pt states she did have a normal bowel movement yesterday but feels like she cannot right now. Pt states she take Wegovy weekly.

## 2025-01-23 NOTE — ED NOTES
Pt sitting on side of bed c/o pain to abdomen. 1 episode of emesis noted. Provider notified. Updated pt on plan of care. Voiced no needs. Call light in reach.

## 2025-01-23 NOTE — ED NOTES
Upon first contact with patient this RN receives bedside shift report from Brenda ORR. Pt provided with warm blanket, up to restroom at this time. Vitals stable.

## 2025-01-24 LAB
ALBUMIN SERPL BCG-MCNC: 3.3 G/DL (ref 3.5–5.1)
ALP SERPL-CCNC: 40 U/L (ref 38–126)
ALT SERPL W/O P-5'-P-CCNC: 10 U/L (ref 11–66)
ANION GAP SERPL CALC-SCNC: 11 MEQ/L (ref 8–16)
AST SERPL-CCNC: 11 U/L (ref 5–40)
BACTERIA UR CULT: ABNORMAL
BASOPHILS ABSOLUTE: 0 THOU/MM3 (ref 0–0.1)
BASOPHILS NFR BLD AUTO: 0.4 %
BILIRUB SERPL-MCNC: 0.4 MG/DL (ref 0.3–1.2)
BUN SERPL-MCNC: 10 MG/DL (ref 7–22)
CALCIUM SERPL-MCNC: 8.5 MG/DL (ref 8.5–10.5)
CHLORIDE SERPL-SCNC: 108 MEQ/L (ref 98–111)
CO2 SERPL-SCNC: 21 MEQ/L (ref 23–33)
CREAT SERPL-MCNC: 0.8 MG/DL (ref 0.4–1.2)
DEPRECATED RDW RBC AUTO: 43.8 FL (ref 35–45)
EOSINOPHIL NFR BLD AUTO: 7.5 %
EOSINOPHILS ABSOLUTE: 0.6 THOU/MM3 (ref 0–0.4)
ERYTHROCYTE [DISTWIDTH] IN BLOOD BY AUTOMATED COUNT: 12.8 % (ref 11.5–14.5)
GFR SERPL CREATININE-BSD FRML MDRD: > 90 ML/MIN/1.73M2
GLUCOSE SERPL-MCNC: 76 MG/DL (ref 70–108)
HCT VFR BLD AUTO: 39.2 % (ref 37–47)
HGB BLD-MCNC: 12 GM/DL (ref 12–16)
IMM GRANULOCYTES # BLD AUTO: 0.02 THOU/MM3 (ref 0–0.07)
IMM GRANULOCYTES NFR BLD AUTO: 0.2 %
LYMPHOCYTES ABSOLUTE: 2.4 THOU/MM3 (ref 1–4.8)
LYMPHOCYTES NFR BLD AUTO: 30.5 %
MCH RBC QN AUTO: 28.6 PG (ref 26–33)
MCHC RBC AUTO-ENTMCNC: 30.6 GM/DL (ref 32.2–35.5)
MCV RBC AUTO: 93.3 FL (ref 81–99)
MONOCYTES ABSOLUTE: 0.6 THOU/MM3 (ref 0.4–1.3)
MONOCYTES NFR BLD AUTO: 8.1 %
NEUTROPHILS ABSOLUTE: 4.3 THOU/MM3 (ref 1.8–7.7)
NEUTROPHILS NFR BLD AUTO: 53.3 %
NRBC BLD AUTO-RTO: 0 /100 WBC
ORGANISM: ABNORMAL
PLATELET # BLD AUTO: 278 THOU/MM3 (ref 130–400)
PMV BLD AUTO: 9.8 FL (ref 9.4–12.4)
POTASSIUM SERPL-SCNC: 3.7 MEQ/L (ref 3.5–5.2)
PROT SERPL-MCNC: 6.1 G/DL (ref 6.1–8)
RBC # BLD AUTO: 4.2 MILL/MM3 (ref 4.2–5.4)
SODIUM SERPL-SCNC: 140 MEQ/L (ref 135–145)
WBC # BLD AUTO: 8 THOU/MM3 (ref 4.8–10.8)

## 2025-01-24 PROCEDURE — 96361 HYDRATE IV INFUSION ADD-ON: CPT

## 2025-01-24 PROCEDURE — 36415 COLL VENOUS BLD VENIPUNCTURE: CPT

## 2025-01-24 PROCEDURE — 96376 TX/PRO/DX INJ SAME DRUG ADON: CPT

## 2025-01-24 PROCEDURE — G0378 HOSPITAL OBSERVATION PER HR: HCPCS

## 2025-01-24 PROCEDURE — 85025 COMPLETE CBC W/AUTO DIFF WBC: CPT

## 2025-01-24 PROCEDURE — 2500000003 HC RX 250 WO HCPCS: Performed by: NURSE PRACTITIONER

## 2025-01-24 PROCEDURE — 2500000003 HC RX 250 WO HCPCS

## 2025-01-24 PROCEDURE — 6370000000 HC RX 637 (ALT 250 FOR IP)

## 2025-01-24 PROCEDURE — 6360000002 HC RX W HCPCS

## 2025-01-24 PROCEDURE — 99232 SBSQ HOSP IP/OBS MODERATE 35: CPT | Performed by: NURSE PRACTITIONER

## 2025-01-24 PROCEDURE — 2580000003 HC RX 258: Performed by: NURSE PRACTITIONER

## 2025-01-24 PROCEDURE — 96372 THER/PROPH/DIAG INJ SC/IM: CPT

## 2025-01-24 PROCEDURE — 80053 COMPREHEN METABOLIC PANEL: CPT

## 2025-01-24 RX ADMIN — ONDANSETRON 4 MG: 2 INJECTION INTRAMUSCULAR; INTRAVENOUS at 21:59

## 2025-01-24 RX ADMIN — FAMOTIDINE 20 MG: 10 INJECTION, SOLUTION INTRAVENOUS at 08:34

## 2025-01-24 RX ADMIN — ENOXAPARIN SODIUM 40 MG: 100 INJECTION SUBCUTANEOUS at 08:23

## 2025-01-24 RX ADMIN — ONDANSETRON 4 MG: 2 INJECTION INTRAMUSCULAR; INTRAVENOUS at 08:23

## 2025-01-24 RX ADMIN — MORPHINE SULFATE 2 MG: 2 INJECTION, SOLUTION INTRAMUSCULAR; INTRAVENOUS at 21:59

## 2025-01-24 RX ADMIN — SODIUM CHLORIDE, PRESERVATIVE FREE 10 ML: 5 INJECTION INTRAVENOUS at 22:05

## 2025-01-24 RX ADMIN — POLYETHYLENE GLYCOL 3350 17 G: 17 POWDER, FOR SOLUTION ORAL at 21:59

## 2025-01-24 RX ADMIN — MORPHINE SULFATE 2 MG: 2 INJECTION, SOLUTION INTRAMUSCULAR; INTRAVENOUS at 08:24

## 2025-01-24 RX ADMIN — MORPHINE SULFATE 2 MG: 2 INJECTION, SOLUTION INTRAMUSCULAR; INTRAVENOUS at 13:10

## 2025-01-24 RX ADMIN — SODIUM CHLORIDE, PRESERVATIVE FREE 10 ML: 5 INJECTION INTRAVENOUS at 08:23

## 2025-01-24 ASSESSMENT — PAIN SCALES - GENERAL
PAINLEVEL_OUTOF10: 7
PAINLEVEL_OUTOF10: 7
PAINLEVEL_OUTOF10: 3
PAINLEVEL_OUTOF10: 8
PAINLEVEL_OUTOF10: 3
PAINLEVEL_OUTOF10: 6

## 2025-01-24 ASSESSMENT — PAIN DESCRIPTION - LOCATION
LOCATION: ABDOMEN
LOCATION: ABDOMEN

## 2025-01-24 ASSESSMENT — PAIN DESCRIPTION - DESCRIPTORS: DESCRIPTORS: ACHING;CRAMPING

## 2025-01-24 ASSESSMENT — PAIN SCALES - WONG BAKER: WONGBAKER_NUMERICALRESPONSE: NO HURT

## 2025-01-24 NOTE — PLAN OF CARE
Problem: Discharge Planning  Goal: Discharge to home or other facility with appropriate resources  1/24/2025 0417 by Sonia Long, RN  Outcome: Progressing  1/23/2025 1507 by Chichi Summers, RN  Outcome: Progressing  Flowsheets  Taken 1/23/2025 1435  Discharge to home or other facility with appropriate resources:   Identify barriers to discharge with patient and caregiver   Identify discharge learning needs (meds, wound care, etc)  Taken 1/23/2025 1430  Discharge to home or other facility with appropriate resources:   Identify barriers to discharge with patient and caregiver   Identify discharge learning needs (meds, wound care, etc)     Problem: Pain  Goal: Verbalizes/displays adequate comfort level or baseline comfort level  1/24/2025 0417 by Sonia Long, RN  Outcome: Progressing  1/23/2025 1507 by Chichi Summers, RN  Outcome: Progressing

## 2025-01-24 NOTE — PLAN OF CARE
Problem: Discharge Planning  Goal: Discharge to home or other facility with appropriate resources  1/24/2025 1307 by Mandi Sampson RN  Outcome: Progressing  1/24/2025 0417 by Sonia Long RN  Outcome: Progressing     Problem: Pain  Goal: Verbalizes/displays adequate comfort level or baseline comfort level  1/24/2025 1307 by Mandi Sampson RN  Outcome: Progressing  1/24/2025 0417 by Sonia Long RN  Outcome: Progressing

## 2025-01-24 NOTE — PROGRESS NOTES
Hospitalist Progress Note    Patient:  Carl Fuchs      Unit/Bed:8B-34/034-A    YOB: 2000    MRN: [id number]       Acct: [id number]     PCP: Crystal Jalloh APRN - CNP    Date of Admission: 1/23/2025    Assessment/Plan:    Ileus in a patient who was admitted due to abd pain, nausea, vomiting. Patient is reported to be on wegovy; patient  was instructed to hold Wegovy next week   CT abd/pelvis done 1/23/25 showed:Small hiatal hernia.Mildly dilated fluid-filled loop of small bowel in the left upper  quadrant. The finding may be transient. The differential diagnosis  includes small bowel obstruction and regional ileus.   No LFT elevations  Lipase within normal limits   Per record patient refused NG was kept  NPO as this will likely self resolve   -No significant electrolyte derangements  Patient reports nausea and pain improving  Will continue to monitor and introduce clear liquid when  appropriate  Continue IVFs gentle hydration  IV Pepcid, IV Zofran prn  Monitor electrolytes and replace per protocol    2.Obesity BMI 60.20  Encourage health food choices when taking po  Patient on Wegovy at home     3.History of polycystic ovarian syndrome         Anticipated Discharge in : Pending course    Active Hospital Problems    Diagnosis Date Noted    Ileus (HCC) [K56.7] 01/23/2025    Abdominal pain [R10.9] 01/23/2025             Chief Complaint:  nausea, vomiting, abd pain     Hospital Course: HPI: 25-year-old female past medical history of polycystic ovarian syndrome and morbid obesity presented with 2 weeks of upper abdominal pain.  Patient developed associated nausea and vomiting 6 days ago.  She relates that she has had about 4 episodes of vomiting since this time.  She also reports diarrhea but states this is normal for her as she has had a prior cholecystectomy.  No blood noted in the vomiting or diarrhea.  Denies constipation.  She states she did take her Wegovy on Tuesday this week.  She has

## 2025-01-25 VITALS
HEART RATE: 75 BPM | SYSTOLIC BLOOD PRESSURE: 111 MMHG | TEMPERATURE: 98.1 F | HEIGHT: 65 IN | BODY MASS INDEX: 48.82 KG/M2 | WEIGHT: 293 LBS | DIASTOLIC BLOOD PRESSURE: 62 MMHG | RESPIRATION RATE: 16 BRPM | OXYGEN SATURATION: 99 %

## 2025-01-25 PROCEDURE — 99239 HOSP IP/OBS DSCHRG MGMT >30: CPT | Performed by: NURSE PRACTITIONER

## 2025-01-25 PROCEDURE — 2500000003 HC RX 250 WO HCPCS

## 2025-01-25 PROCEDURE — 96361 HYDRATE IV INFUSION ADD-ON: CPT

## 2025-01-25 PROCEDURE — 1200000000 HC SEMI PRIVATE

## 2025-01-25 PROCEDURE — 6360000002 HC RX W HCPCS

## 2025-01-25 PROCEDURE — G0378 HOSPITAL OBSERVATION PER HR: HCPCS

## 2025-01-25 PROCEDURE — 2500000003 HC RX 250 WO HCPCS: Performed by: NURSE PRACTITIONER

## 2025-01-25 PROCEDURE — 2580000003 HC RX 258: Performed by: NURSE PRACTITIONER

## 2025-01-25 RX ADMIN — FAMOTIDINE 20 MG: 10 INJECTION, SOLUTION INTRAVENOUS at 09:21

## 2025-01-25 RX ADMIN — ENOXAPARIN SODIUM 40 MG: 100 INJECTION SUBCUTANEOUS at 09:21

## 2025-01-25 RX ADMIN — SODIUM CHLORIDE, PRESERVATIVE FREE 10 ML: 5 INJECTION INTRAVENOUS at 09:22

## 2025-01-25 NOTE — DISCHARGE SUMMARY
Hospital Medicine Discharge Summary      Patient Identification:   Carl Fuchs   : 2000  MRN: [id number]   Account: [id number]      Patient's PCP: Crystal Jalloh, KAL Rob CNP    Admit Date: 2025     Discharge Date: 2025      Admitting Physician: Patty Gatica MD     Discharge Physician: KAL Hood CNP     Discharge Diagnoses:    Ileus  resolved.  CT abd/pelvis done 25 showed:Small hiatal hernia.Mildly dilated fluid-filled loop of small bowel in the left upper  quadrant. The finding may be transient. The differential diagnosis  includes small bowel obstruction and regional ileus.  Patient was initially NPO, treated with IV fluids gentle hydration,IV Zofran PRN.Nausea and pain subsided, clear liquid introduced and diet advance, patient tolerated well reported no pain or nausea or vomiting.   Patient  was instructed to hold Wegovy next week      2.Obesity BMI 60.20  Encourage health food choices when taking po  Patient on Wegovy at home      3.History of polycystic ovarian syndrome          Active Hospital Problems    Diagnosis Date Noted    Ileus (HCC) [K56.7] 2025    Abdominal pain [R10.9] 2025       The patient was seen and examined on day of discharge and this discharge summary is in conjunction with any daily progress note from day of discharge.        Exam:     Vitals:  Vitals:    25 1725 25 2159 25 2229 25 0815   BP:  106/70  111/62   Pulse:  75  75   Resp: 18 18 16 16   Temp:  98 °F (36.7 °C)  98.1 °F (36.7 °C)   TempSrc:  Oral  Oral   SpO2:  97%  99%   Weight:       Height:         Weight: Weight - Scale: (!) 164.1 kg (361 lb 12.4 oz)     24 hour intake/output:No intake or output data in the 24 hours ending 25 1318      General appearance:  Awake alert No apparent distress, appears stated age and cooperative.  HEENT:  Normal cephalic, atraumatic without obvious deformity. Pupils equal, round, and reactive to light.  Extra  dose modulation techniques and iterative    reconstructions, and/or weight-based dosing   when appropriate to reduce radiation to a low as reasonably achievable.             Consults:     None    Disposition:    [x] Home       [] TCU       [] Rehab       [] Psych       [] SNF       [] Long Term Care Facility       [] Other-    Condition at Discharge: Stable    Code Status:  Full Code     Patient Instructions:    Discharge lab work:   Activity: activity as tolerated  Diet: ADULT DIET; Regular      Follow-up visits:   Crystal Jalloh APRN - CNP  [address] [address]  [phone]    Follow up  please follow up in 5-7 days with PCP         Discharge Medications:        Medication List        CONTINUE taking these medications      clobetasol 0.05 % ointment  Commonly known as: TEMOVATE     cyclobenzaprine 10 MG tablet  Commonly known as: FLEXERIL     naproxen 500 MG tablet  Commonly known as: NAPROSYN  Take 1 tablet by mouth 2 times daily as needed for Pain     Wegovy 0.25 MG/0.5ML Soaj SC injection  Generic drug: Semaglutide-Weight Management            STOP taking these medications      PRENATAL 1 PO              Time Spent on discharge is more than 45 minutes  in the examination, evaluation, counseling and review of medications and discharge plan.    Signed:    Thank you Crystal Jalloh APRN - CNP for the opportunity to be involved in this patient's care.    Electronically signed by KAL Hood CNP on 1/25/2025 at 1:18 PM

## 2025-01-29 ENCOUNTER — OFFICE VISIT (OUTPATIENT)
Dept: BARIATRICS/WEIGHT MGMT | Age: 25
End: 2025-01-29
Payer: COMMERCIAL

## 2025-01-29 VITALS
DIASTOLIC BLOOD PRESSURE: 88 MMHG | HEIGHT: 65 IN | TEMPERATURE: 97.5 F | BODY MASS INDEX: 48.82 KG/M2 | SYSTOLIC BLOOD PRESSURE: 112 MMHG | WEIGHT: 293 LBS | HEART RATE: 76 BPM

## 2025-01-29 DIAGNOSIS — K21.9 GASTROESOPHAGEAL REFLUX DISEASE, UNSPECIFIED WHETHER ESOPHAGITIS PRESENT: ICD-10-CM

## 2025-01-29 DIAGNOSIS — K76.0 FATTY LIVER: ICD-10-CM

## 2025-01-29 PROCEDURE — 99213 OFFICE O/P EST LOW 20 MIN: CPT | Performed by: PHYSICIAN ASSISTANT

## 2025-01-29 RX ORDER — SEMAGLUTIDE 1 MG/.5ML
INJECTION, SOLUTION SUBCUTANEOUS
COMMUNITY
Start: 2025-01-09

## 2025-01-29 NOTE — PATIENT INSTRUCTIONS
Behavior modification discussed in detail in regards to dietary habits.  Nutritional education occurred during visit. Continue following recommendations  per dietitian.  Improvement in fitness/exercise discussed with patient and the need for this  with/without surgery.  Psychology evaluation with Dr. Javier CLAYTON prior to any surgical intervention.    Encouraged to attend support groups.  Goal to lose 1-2# per week  Seca scale next month.  Wegovy through PCP- will follow up later today   Continue follow recommendations through dietitian  If drinking juice-advised to read label to verify low sugar/sugar free.   Encouraged 70-80 grams of protein daily.   Continue at least 100oz of water daily.   Continue dedicated physical activity at least 2-3 times per week

## 2025-01-29 NOTE — PROGRESS NOTES
[facility]  [address], [address]  [place] [address]  [phone]      Visit Date:  1/29/2025  Weight Management Pre-Op Follow-up    HPI:    Medically Supervised follow-up- Month #2 of 3- medical supervision- planning surgery eventually.    Cral Fuchs is here today for continued supervised weight management of morbid obesity. Continues Wegovy through PCP.  Currently holding 1mg Wegovy. Has apt to discuss medication with PCP later today as recent hospital admission for bowel obstruction/ileus that resolved with conservative treatment. Reports that she is having BM every 1-2 days.   No abdominal pain. No nausea/vomiting. Does report decreased appetite since starting medication. Eating 2-3 times daily.  Has been eating eggs/sausage daily for breakfast. Eating 3-4 Tuna packets daily. Eating fruit/vegetables 3-4 times per week.  Has been snacking on [place] mixed nuts.  Reports that she is not on a consistent schedule with eating, but when eating she is making good choices. Not eating out. Not eating sweets/junk food.  Has been slowing down when eating. Drinking over 100oz of water daily. Occasional juice-advised to read label to verify sugar free. Weight today 359#. Down 10# since last month. Down 20# since starting with weight management. BMI 59.  Has been [facility] week over the last month.  Comorbid conditions include GERD and fatty liver.  If feeling any heartburn will drink a glass of milk. No current medications for GERD.    Interested in switching over to the surgical pathway and feels that she is now ready. Will discuss with dietitian and plan to start process working towards surgery at 2/19/25 apt.     Will repeat SECA scale next month.      Physical Activity:  [facility] twice per week over the last month      Current BMI: Body mass index is 59.84 kg/m².  Current Weight:   Wt Readings from Last 3 Encounters:   01/29/25 (!) 163.1

## 2025-02-18 NOTE — PROGRESS NOTES
Assessment: Patient is a 25 y.o. female seen for month three  follow up MNT visit for  medically supervised weight loss.  Pt plans to move forward in surgical pathway desires bypass    Vitals from current and previous visits:         2/19/2025  9:50 AM   Vitals    SYSTOLIC 128    DIASTOLIC 82    Site Right Upper Arm    Position Sitting    Cuff Size Large Adult    Pulse 72    Temp 97.7 °F (36.5 °C)    Respirations    SpO2    Weight - Scale 359 lb (H)    Height 5' 5\"    Body Mass Index 59.74 kg/m2 (H)    Pain Level       Initial weight at start of Weight Management Program was: 379 lbs     Carl down 16 lbs form last RD visit  -Weight goal: lose weight.     -Nutritionally relevant labs: Initial lab work ordered for patient today with plans for weight loss surgery  Lab Results   Component Value Date/Time    GLUCOSE 76 01/24/2025 05:29 AM    GLUCOSE 91 01/23/2025 03:20 AM    CHOL 182 12/22/2021 10:55 AM    HDL 48 12/22/2021 10:55 AM    TRIG 74 12/22/2021 10:55 AM                  Lab Results   Component Value Date/Time    VITD25 17.0 12/22/2021 10:55 AM   EGD and Dr Herrera referral placed today  Work and Sleep Schedule: right now 10 hour shifts 3:30p-1am off on Saturday and Sunday     Goes to bed ~ 1-2 am and wakes up 7:40am and stays up  Currently has [facility]. Lives with [facility]  Fastly scheduled 2/26/25  - Is patient taking daily Multivitamin:  Does not take a MVI    Wegovy on Tuesdays 1.0 mg weekly.  Waiting on 1.7 mg approval from PCP.   Encouraged pt to stay as close to previous week injection or if goes too long recommend starting at lower dose to avoid GI side effects.  Denies constipation    Today states had three cupcakes at [employer] on Friday and got sick-emesis and diarrhea.  On Sunday got sick with three stuffed shells- had emesis  -Food Recall:   Pt given food journal at last office visit and did not have today.  Encouraged pt to bring this back next month    Breakfast: today had two string cheese sticks

## 2025-02-19 ENCOUNTER — OFFICE VISIT (OUTPATIENT)
Dept: BARIATRICS/WEIGHT MGMT | Age: 25
End: 2025-02-19

## 2025-02-19 ENCOUNTER — OFFICE VISIT (OUTPATIENT)
Dept: BARIATRICS/WEIGHT MGMT | Age: 25
End: 2025-02-19
Payer: COMMERCIAL

## 2025-02-19 VITALS
DIASTOLIC BLOOD PRESSURE: 82 MMHG | HEART RATE: 72 BPM | WEIGHT: 293 LBS | BODY MASS INDEX: 48.82 KG/M2 | TEMPERATURE: 97.7 F | SYSTOLIC BLOOD PRESSURE: 128 MMHG | HEIGHT: 65 IN

## 2025-02-19 DIAGNOSIS — Z13.21 MALNUTRITION SCREEN: ICD-10-CM

## 2025-02-19 DIAGNOSIS — Z01.818 PRE-OP TESTING: Primary | ICD-10-CM

## 2025-02-19 DIAGNOSIS — K21.9 GASTROESOPHAGEAL REFLUX DISEASE, UNSPECIFIED WHETHER ESOPHAGITIS PRESENT: ICD-10-CM

## 2025-02-19 DIAGNOSIS — K76.0 FATTY LIVER: ICD-10-CM

## 2025-02-19 DIAGNOSIS — E66.01 MORBID OBESITY WITH BMI OF 50.0-59.9, ADULT: ICD-10-CM

## 2025-02-19 PROCEDURE — 99214 OFFICE O/P EST MOD 30 MIN: CPT | Performed by: PHYSICIAN ASSISTANT

## 2025-02-19 NOTE — PROGRESS NOTES
(around 3/19/2025) for Follow up.     I spent over 37 minutes with the patient, with greater that 50% of that time spent on education, counseling and coordination of care.     Electronically signed by RIKA Lion on 2/19/2025 at 12:09 PM

## 2025-02-19 NOTE — PATIENT INSTRUCTIONS
Behavior modification discussed in detail in regards to dietary habits.  Nutritional education occurred during visit. Continue following recommendations  per dietitian.  Improvement in fitness/exercise discussed with patient and the need for this  with/without surgery.  EKG ordered. Cardiac Clearance needed prior to surgery prn  Psychology evaluation with Dr. Herrera- to be scheduled  EGD prior to any surgical intervention. Will send referral- to  ( has apt scheduled 2/26/25)  Encouraged to attend support groups.  Goal to lose 5% TBW prior to surgery. Down 20#  Seca scale next month  Initial labs ordered  Drug screen ordered  Nicotine ordered. Discussed risks of nicotine a/w bariatric surgery. Must be nicotine free at least 90 days prior to surgery. Avoid nicotine life long post-op.   Denies current pregnancy. Advised not to get pregnant for 18 months post bariatric surgery as this can increase risk of malnourishment potentially leading to low birth weight or malformation. Patient agrees to avoid pregnancy for at least 18 months post-op. Discussed importance of appropriate contraception.  Advised to not use oral contraceptive post RYGB due to possible absorption issues.   Will need to be off work for 3-4 weeks post-bariatric surgery.  No lifting/pushing/pulling over 20# for 4 weeks post-op  No NSAIDS 10 days prior to bariatric surgery. Avoid NSAID use post-op  Discussed Lovenox post-op bariatric surgery  Awaiting LOMN   Will continue following with multi-disciplinary team in preparation for bariatric surgery with the expectation of lifelong follow-up post-operatively.    To stop wegovy 2 weeks prior to surgery.

## 2025-02-19 NOTE — PATIENT INSTRUCTIONS
Goals:  Nutrition Goal:  I will use my food journal to record meal times, serving sizes and bring back to next dietitian visit  Suggested eating times: 8:30am-9am-  11:30am- lunch  6pm- dinner meal  9:00p- small snack.  Suggest one 30 gram protein shake per day  Water Goal:  Continue at least 64 oz of water or greater each day-   Use treadmill at home or [employer] with cousin  5.  Start Equate Complete Multivitamin For Adults one pill a day ( can get at [facility])  6.  Get lab work done given to you today.  Will need to fast- nothing to eat or drink besides water for 10-12 hours.

## 2025-02-26 ENCOUNTER — TELEPHONE (OUTPATIENT)
Dept: BARIATRICS/WEIGHT MGMT | Age: 25
End: 2025-02-26

## 2025-02-26 NOTE — TELEPHONE ENCOUNTER
Pt called to see if her currently scheduled EGD for 05/14/25 would push her surgery date out even further. She will call back to ask for a referral to a different provider if she can't get in sooner for this EGD.

## 2025-03-06 ENCOUNTER — HOSPITAL ENCOUNTER (OUTPATIENT)
Age: 25
Discharge: HOME | End: 2025-03-06
Payer: COMMERCIAL

## 2025-03-06 DIAGNOSIS — Z01.818 PRE-OP TESTING: ICD-10-CM

## 2025-03-06 DIAGNOSIS — Z13.21 MALNUTRITION SCREEN: ICD-10-CM

## 2025-03-06 LAB
25(OH)D3 SERPL-MCNC: 13 NG/ML (ref 30–100)
AMPHETAMINES UR QL SCN: NEGATIVE
BARBITURATES UR QL SCN: NEGATIVE
BENZODIAZ UR QL SCN: NEGATIVE
BZE UR QL SCN: NEGATIVE
CANNABINOIDS UR QL SCN: NEGATIVE
CHOLEST SERPL-MCNC: 163 MG/DL (ref 100–199)
DEPRECATED MEAN GLUCOSE BLD GHB EST-ACNC: 99 MG/DL (ref 70–126)
EKG ATRIAL RATE: 87 BPM
EKG P AXIS: 58 DEGREES
EKG P-R INTERVAL: 162 MS
EKG Q-T INTERVAL: 350 MS
EKG QRS DURATION: 70 MS
EKG QTC CALCULATION (BAZETT): 421 MS
EKG R AXIS: 26 DEGREES
EKG T AXIS: 39 DEGREES
EKG VENTRICULAR RATE: 87 BPM
FENTANYL: NEGATIVE
FERRITIN SERPL IA-MCNC: 108 NG/ML (ref 13–150)
FOLATE SERPL-MCNC: 7.3 NG/ML (ref 4.6–34.8)
HBA1C MFR BLD HPLC: 5.3 % (ref 4–6)
HDLC SERPL-MCNC: 41 MG/DL
INR PPP: 0.97 (ref 0.85–1.13)
IRON SATN MFR SERPL: 24 % (ref 20–50)
IRON SERPL-MCNC: 67 UG/DL (ref 37–145)
LDLC SERPL CALC-MCNC: 111 MG/DL
OPIATES UR QL SCN: NEGATIVE
OXYCODONE: NEGATIVE
PCP UR QL SCN: NEGATIVE
PTH-INTACT SERPL-MCNC: 29 PG/ML (ref 15–65)
TIBC SERPL-MCNC: 276 UG/DL (ref 171–450)
TRIGL SERPL-MCNC: 55 MG/DL (ref 0–199)
TSH SERPL DL<=0.05 MIU/L-ACNC: 0.57 UIU/ML (ref 0.27–4.2)
VIT B12 SERPL-MCNC: 472 PG/ML (ref 232–1245)

## 2025-03-06 PROCEDURE — 36415 COLL VENOUS BLD VENIPUNCTURE: CPT

## 2025-03-06 PROCEDURE — 83550 IRON BINDING TEST: CPT

## 2025-03-06 PROCEDURE — 83970 ASSAY OF PARATHORMONE: CPT

## 2025-03-06 PROCEDURE — 84443 ASSAY THYROID STIM HORMONE: CPT

## 2025-03-06 PROCEDURE — 82306 VITAMIN D 25 HYDROXY: CPT

## 2025-03-06 PROCEDURE — 82728 ASSAY OF FERRITIN: CPT

## 2025-03-06 PROCEDURE — 93005 ELECTROCARDIOGRAM TRACING: CPT | Performed by: PHYSICIAN ASSISTANT

## 2025-03-06 PROCEDURE — G0480 DRUG TEST DEF 1-7 CLASSES: HCPCS

## 2025-03-06 PROCEDURE — 80061 LIPID PANEL: CPT

## 2025-03-06 PROCEDURE — 80307 DRUG TEST PRSMV CHEM ANLYZR: CPT

## 2025-03-06 PROCEDURE — 83540 ASSAY OF IRON: CPT

## 2025-03-06 PROCEDURE — 85610 PROTHROMBIN TIME: CPT

## 2025-03-06 PROCEDURE — 82607 VITAMIN B-12: CPT

## 2025-03-06 PROCEDURE — 84590 ASSAY OF VITAMIN A: CPT

## 2025-03-06 PROCEDURE — 84425 ASSAY OF VITAMIN B-1: CPT

## 2025-03-06 PROCEDURE — 83036 HEMOGLOBIN GLYCOSYLATED A1C: CPT

## 2025-03-06 PROCEDURE — 82746 ASSAY OF FOLIC ACID SERUM: CPT

## 2025-03-09 LAB — VIT A SERPL-MCNC: NORMAL UG/ML

## 2025-03-10 ENCOUNTER — OFFICE VISIT (OUTPATIENT)
Dept: PSYCHOLOGY | Age: 25
End: 2025-03-10
Payer: COMMERCIAL

## 2025-03-10 DIAGNOSIS — F54 PSYCHOLOGICAL FACTORS AFFECTING MEDICAL CONDITION: Primary | ICD-10-CM

## 2025-03-10 DIAGNOSIS — E66.01 MORBID OBESITY: ICD-10-CM

## 2025-03-10 DIAGNOSIS — E55.9 VITAMIN D DEFICIENCY: Primary | ICD-10-CM

## 2025-03-10 LAB
COTININE SERPL-MCNC: < 5 NG/ML
NICOTINE SERPL-MCNC: < 5 NG/ML
VIT B1 PYROPHOSHATE BLD-SCNC: 140 NMOL/L (ref 70–180)

## 2025-03-10 PROCEDURE — 96131 PSYCL TST EVAL PHYS/QHP EA: CPT | Performed by: PSYCHOLOGIST

## 2025-03-10 PROCEDURE — 96130 PSYCL TST EVAL PHYS/QHP 1ST: CPT | Performed by: PSYCHOLOGIST

## 2025-03-10 PROCEDURE — 1036F TOBACCO NON-USER: CPT | Performed by: PSYCHOLOGIST

## 2025-03-10 PROCEDURE — 90791 PSYCH DIAGNOSTIC EVALUATION: CPT | Performed by: PSYCHOLOGIST

## 2025-03-10 RX ORDER — ERGOCALCIFEROL 1.25 MG/1
50000 CAPSULE, LIQUID FILLED ORAL WEEKLY
Qty: 12 CAPSULE | Refills: 0 | Status: SHIPPED | OUTPATIENT
Start: 2025-03-10

## 2025-03-10 NOTE — PROGRESS NOTES
ROUTINE PSYCHOLOGICAL EVALUATION FOR BARIATRIC SURGERY:  Carl Fuchs  is a 25 y.o. year-old, female with morbid obesity (BMI 59.74), referred for a routine psychiatric evaluation for bariatric surgery.     Of note, patient was seen for an abbreviated intake session today due to schedule constraints by provider.  Also, patient's mom was present for most of today's session upon patient's request.    WEIGHT HISTORY    Carl Fuchs has considered bariatric surgery for: UNABLE TO ASSESS   Overweight since: UNABLE TO ASSESS   Weight Loss Attempts include (self-directed/formal): Currently on Wegovy. Lost 80lbs drinking water only and eating only once per day. UNABLE TO FULLY ASSESS     Eating Behaviors endorsed by patient: Reports skipping meals (lunch). She reports having a plan to make lunch and dinner together and will taste her food when cooking.  UNABLE TO FULLY ASSESS     Caffeine and Carbonated beverages (last use/average use): Denies current caffeine use and reports this use has been maintained. She reports 2 years ago she used to consume large amounts of sweet tea but stopped when she got pregnant.  Denies current carbonation use. Reports last carbonation use was last week at which time she had a soda with her meal. She reports average use is only when she eats outside of the home, twice every other week.      Exercise Habits:UNABLE TO ASSESS     Binging/Purging/Restrictive Behaviors (including SIV, laxative/stimulant abuse/obsessive exercise):  Reports last engaged in possible binge eating episode when pregnant with her son approximately 5 years ago. She reports a history of restriction to lose weight and this lasted for approximately 1 year, and would only eat one meal day.     Pre-surgery Weight Loss Goal/Progress: UNABLE TO ASSESS     KNOWLEDGE OF SURGERY/GOALS  Carl Fuchs understands the risks and benefits of bariatric surgery. Patient has realistic expectations and is motivated; has identified the

## 2025-03-11 ENCOUNTER — RESULTS FOLLOW-UP (OUTPATIENT)
Dept: OTHER | Age: 25
End: 2025-03-11

## 2025-03-11 NOTE — TELEPHONE ENCOUNTER
Called pt and let her Vitamin D was low and to take once a week with food for 12 wks. Pt Verbalized understanding.    ----- Message from RIKA Og sent at 3/10/2025  8:21 AM EDT -----  Can you please call Carl and let her know that their Vitamin D level was low at 13 and a Vitamin D supplement was sent to their pharmacy to be taken once a week for the next 12 weeks. Please advise to take with food.  Thank you!  ----- Message -----  From: Jeb Caban Incoming Muse Results  Sent: 3/6/2025   1:58 PM EDT  To: RIKA Lion

## 2025-03-18 ENCOUNTER — HOSPITAL ENCOUNTER (EMERGENCY)
Age: 25
Discharge: HOME | End: 2025-03-18
Attending: EMERGENCY MEDICINE
Payer: COMMERCIAL

## 2025-03-18 ENCOUNTER — APPOINTMENT (OUTPATIENT)
Dept: CT IMAGING | Age: 25
End: 2025-03-18
Payer: COMMERCIAL

## 2025-03-18 VITALS
OXYGEN SATURATION: 95 % | BODY MASS INDEX: 48.82 KG/M2 | WEIGHT: 293 LBS | HEIGHT: 65 IN | SYSTOLIC BLOOD PRESSURE: 149 MMHG | HEART RATE: 86 BPM | RESPIRATION RATE: 20 BRPM | DIASTOLIC BLOOD PRESSURE: 98 MMHG | TEMPERATURE: 98.6 F

## 2025-03-18 DIAGNOSIS — R10.9 ABDOMINAL PAIN, UNSPECIFIED ABDOMINAL LOCATION: Primary | ICD-10-CM

## 2025-03-18 LAB
ANION GAP SERPL CALC-SCNC: 11 MEQ/L (ref 8–16)
B-HCG SERPL QL: NEGATIVE
BACTERIA URNS QL MICRO: ABNORMAL /HPF
BASOPHILS ABSOLUTE: 0 THOU/MM3 (ref 0–0.1)
BASOPHILS NFR BLD AUTO: 0.4 %
BILIRUB UR QL STRIP.AUTO: NEGATIVE
BUN SERPL-MCNC: 12 MG/DL (ref 8–23)
CALCIUM SERPL-MCNC: 9.4 MG/DL (ref 8.6–10)
CASTS #/AREA URNS LPF: ABNORMAL /LPF
CASTS 2: ABNORMAL /LPF
CHARACTER UR: ABNORMAL
CHLORIDE SERPL-SCNC: 108 MEQ/L (ref 98–111)
CO2 SERPL-SCNC: 20 MEQ/L (ref 22–29)
COLOR, UA: YELLOW
CREAT SERPL-MCNC: 0.8 MG/DL (ref 0.5–0.9)
CRYSTALS URNS MICRO: ABNORMAL
DEPRECATED RDW RBC AUTO: 41 FL (ref 35–45)
EKG ATRIAL RATE: 77 BPM
EKG P AXIS: 32 DEGREES
EKG P-R INTERVAL: 148 MS
EKG Q-T INTERVAL: 356 MS
EKG QRS DURATION: 84 MS
EKG QTC CALCULATION (BAZETT): 402 MS
EKG R AXIS: 7 DEGREES
EKG T AXIS: 6 DEGREES
EKG VENTRICULAR RATE: 77 BPM
EOSINOPHIL NFR BLD AUTO: 1.9 %
EOSINOPHILS ABSOLUTE: 0.2 THOU/MM3 (ref 0–0.4)
EPITHELIAL CELLS, UA: ABNORMAL /HPF
ERYTHROCYTE [DISTWIDTH] IN BLOOD BY AUTOMATED COUNT: 12.3 % (ref 11.5–14.5)
GFR SERPL CREATININE-BSD FRML MDRD: > 90 ML/MIN/1.73M2
GLUCOSE SERPL-MCNC: 89 MG/DL (ref 74–109)
GLUCOSE UR QL STRIP.AUTO: NEGATIVE MG/DL
HCT VFR BLD AUTO: 44.7 % (ref 37–47)
HGB BLD-MCNC: 14.2 GM/DL (ref 12–16)
HGB UR QL STRIP.AUTO: NEGATIVE
IMM GRANULOCYTES # BLD AUTO: 0.02 THOU/MM3 (ref 0–0.07)
IMM GRANULOCYTES NFR BLD AUTO: 0.2 %
KETONES UR QL STRIP.AUTO: NEGATIVE
LIPASE SERPL-CCNC: 16 U/L (ref 13–60)
LYMPHOCYTES ABSOLUTE: 2.5 THOU/MM3 (ref 1–4.8)
LYMPHOCYTES NFR BLD AUTO: 26.7 %
MAGNESIUM SERPL-MCNC: 2 MG/DL (ref 1.6–2.6)
MCH RBC QN AUTO: 28.6 PG (ref 26–33)
MCHC RBC AUTO-ENTMCNC: 31.8 GM/DL (ref 32.2–35.5)
MCV RBC AUTO: 89.9 FL (ref 81–99)
MISCELLANEOUS 2: ABNORMAL
MONOCYTES ABSOLUTE: 0.6 THOU/MM3 (ref 0.4–1.3)
MONOCYTES NFR BLD AUTO: 6.5 %
NEUTROPHILS ABSOLUTE: 6.1 THOU/MM3 (ref 1.8–7.7)
NEUTROPHILS NFR BLD AUTO: 64.3 %
NITRITE UR QL STRIP: NEGATIVE
NRBC BLD AUTO-RTO: 0 /100 WBC
OSMOLALITY SERPL CALC.SUM OF ELEC: 276.8 MOSMOL/KG (ref 275–300)
PH UR STRIP.AUTO: 5.5 [PH] (ref 5–9)
PLATELET # BLD AUTO: 367 THOU/MM3 (ref 130–400)
PMV BLD AUTO: 9.7 FL (ref 9.4–12.4)
POTASSIUM SERPL-SCNC: 4.7 MEQ/L (ref 3.5–5.2)
PROT UR STRIP.AUTO-MCNC: NEGATIVE MG/DL
RBC # BLD AUTO: 4.97 MILL/MM3 (ref 4.2–5.4)
RBC URINE: ABNORMAL /HPF
RENAL EPI CELLS #/AREA URNS HPF: ABNORMAL /[HPF]
SODIUM SERPL-SCNC: 139 MEQ/L (ref 135–145)
SP GR UR REFRACT.AUTO: 1.02 (ref 1–1.03)
UROBILINOGEN, URINE: 0.2 EU/DL (ref 0–1)
WBC # BLD AUTO: 9.5 THOU/MM3 (ref 4.8–10.8)
WBC #/AREA URNS HPF: ABNORMAL /HPF
WBC #/AREA URNS HPF: ABNORMAL /[HPF]
YEAST LIKE FUNGI URNS QL MICRO: ABNORMAL

## 2025-03-18 PROCEDURE — 93010 ELECTROCARDIOGRAM REPORT: CPT | Performed by: INTERNAL MEDICINE

## 2025-03-18 PROCEDURE — 85025 COMPLETE CBC W/AUTO DIFF WBC: CPT

## 2025-03-18 PROCEDURE — 74177 CT ABD & PELVIS W/CONTRAST: CPT

## 2025-03-18 PROCEDURE — 6360000004 HC RX CONTRAST MEDICATION

## 2025-03-18 PROCEDURE — 83690 ASSAY OF LIPASE: CPT

## 2025-03-18 PROCEDURE — 87086 URINE CULTURE/COLONY COUNT: CPT

## 2025-03-18 PROCEDURE — 80048 BASIC METABOLIC PNL TOTAL CA: CPT

## 2025-03-18 PROCEDURE — 84703 CHORIONIC GONADOTROPIN ASSAY: CPT

## 2025-03-18 PROCEDURE — 96372 THER/PROPH/DIAG INJ SC/IM: CPT

## 2025-03-18 PROCEDURE — 36415 COLL VENOUS BLD VENIPUNCTURE: CPT

## 2025-03-18 PROCEDURE — 93005 ELECTROCARDIOGRAM TRACING: CPT

## 2025-03-18 PROCEDURE — 99285 EMERGENCY DEPT VISIT HI MDM: CPT

## 2025-03-18 PROCEDURE — 6360000002 HC RX W HCPCS

## 2025-03-18 PROCEDURE — 83735 ASSAY OF MAGNESIUM: CPT

## 2025-03-18 PROCEDURE — 81001 URINALYSIS AUTO W/SCOPE: CPT

## 2025-03-18 RX ORDER — DROPERIDOL 2.5 MG/ML
0.62 INJECTION, SOLUTION INTRAMUSCULAR; INTRAVENOUS ONCE
Status: COMPLETED | OUTPATIENT
Start: 2025-03-18 | End: 2025-03-18

## 2025-03-18 RX ORDER — DICYCLOMINE HYDROCHLORIDE 10 MG/1
10 CAPSULE ORAL
Qty: 40 CAPSULE | Refills: 0 | Status: SHIPPED | OUTPATIENT
Start: 2025-03-18

## 2025-03-18 RX ORDER — DROPERIDOL 2.5 MG/ML
0.62 INJECTION, SOLUTION INTRAMUSCULAR; INTRAVENOUS ONCE
Status: DISCONTINUED | OUTPATIENT
Start: 2025-03-18 | End: 2025-03-18

## 2025-03-18 RX ORDER — IOPAMIDOL 755 MG/ML
80 INJECTION, SOLUTION INTRAVASCULAR
Status: COMPLETED | OUTPATIENT
Start: 2025-03-18 | End: 2025-03-18

## 2025-03-18 RX ADMIN — IOPAMIDOL 80 ML: 755 INJECTION, SOLUTION INTRAVENOUS at 14:11

## 2025-03-18 RX ADMIN — DROPERIDOL 0.62 MG: 2.5 INJECTION, SOLUTION INTRAMUSCULAR; INTRAVENOUS at 11:23

## 2025-03-18 ASSESSMENT — PAIN - FUNCTIONAL ASSESSMENT: PAIN_FUNCTIONAL_ASSESSMENT: 0-10

## 2025-03-18 ASSESSMENT — PAIN SCALES - GENERAL: PAINLEVEL_OUTOF10: 8

## 2025-03-18 ASSESSMENT — PAIN DESCRIPTION - LOCATION: LOCATION: ABDOMEN

## 2025-03-18 ASSESSMENT — PAIN DESCRIPTION - DESCRIPTORS: DESCRIPTORS: SHARP;ACHING;CRAMPING

## 2025-03-18 ASSESSMENT — PAIN DESCRIPTION - PAIN TYPE: TYPE: ACUTE PAIN

## 2025-03-18 ASSESSMENT — PAIN DESCRIPTION - FREQUENCY: FREQUENCY: INTERMITTENT

## 2025-03-18 NOTE — ED PROVIDER NOTES
[facility] EMERGENCY DEPARTMENT - VISIT NOTE    Patient Name: Carl Fuchs  MRN: [id number]  YOB: 2000  Date of Evaluation: 3/18/2025  Treating Resident Physician: Carlos A Hirsch MD  Supervising Physician: Jess Dorman MD    CHIEF COMPLAINT       Chief Complaint   Patient presents with    Abdominal Pain    Diarrhea       HISTORY OF PRESENT ILLNESS    HPI    History obtained from chart review and the patient.    Carl is a 25 y.o. old female with history of morbid obesity on Wegovy, lap andriy who presents to the emergency department by Walk In for evaluation of 1 day nausea, epigastric abdominal pain, nonbloody diarrhea.  Pain worsens with bending over.  She uses MiraLAX 2 times a week with irregular bowel habits while on Wegovy, including hospitalization from 1/23 - 1/25 for ileus that resolved without surgical intervention.  LMP end of February, does not use any birth control.    Chart reviewed, relevant history summarized in HPI above.      REVIEW OF SYSTEMS   Review of Systems  Negative unless documented in HPI    PAST MEDICAL AND SURGICAL HISTORY   Carl  has a past medical history of Gallstones, Liver disease, and Polycystic ovary disease.    Carl  has a past surgical history that includes Tonsillectomy and adenoidectomy; cyst removal (Left); Cholecystectomy, laparoscopic (N/A, 1/30/2023); Colonoscopy (N/A, 12/26/2023); and Upper gastrointestinal endoscopy (N/A, 2/9/2024).    CURRENT MEDICATIONS   Carl has a current medication list which includes the following long-term medication(s): dicyclomine, vitamin d, wegovy, and [DISCONTINUED] prenatal mv-min-fe fum-fa-dha.    ALLERGIES   Carl is allergic to acyclovir and valacyclovir.    FAMILY HISTORY   Carl family history includes Asthma in her brother and mother; Cancer in her paternal grandmother; Diabetes in her mother, paternal aunt, paternal grandmother, and sister; Gall Bladder Disease in her sister; Thyroid Disease in her

## 2025-03-18 NOTE — ED NOTES
PT to ED with c/o abdominal pain and diarrhea. Pt states she cannot eat or drink without pain. Pt states she has a cyst on her kidney. Pt states she is taking weight loss medication.

## 2025-03-18 NOTE — DISCHARGE INSTRUCTIONS
Use Bentyl at home to control abdominal cramping sensations.    Follow-up with your primary care provider and/or GI for further evaluation of your symptoms.

## 2025-03-18 NOTE — PROGRESS NOTES
(4-5 days a week)  5.   Slow down when eating - take 20 minutes to eat as the medication is slowing down your gastro motility.   Avoid fried and greasy foods.  Focus on lean protein foods (see list given to you) and do not go long periods between meals.       -Followup visit: JENN and Dr Nayak on 4/25/25 as requires to meet with surgeon as desires weight loss surgery        Lauren Saunders, JENN, LD,   Dietitian- [facility]  [facility]

## 2025-03-18 NOTE — ED PROVIDER NOTES

## 2025-03-19 ENCOUNTER — OFFICE VISIT (OUTPATIENT)
Dept: BARIATRICS/WEIGHT MGMT | Age: 25
End: 2025-03-19
Payer: COMMERCIAL

## 2025-03-19 ENCOUNTER — OFFICE VISIT (OUTPATIENT)
Dept: BARIATRICS/WEIGHT MGMT | Age: 25
End: 2025-03-19

## 2025-03-19 VITALS
SYSTOLIC BLOOD PRESSURE: 124 MMHG | TEMPERATURE: 97 F | HEIGHT: 65 IN | HEART RATE: 98 BPM | WEIGHT: 293 LBS | BODY MASS INDEX: 48.82 KG/M2 | DIASTOLIC BLOOD PRESSURE: 82 MMHG

## 2025-03-19 VITALS — BODY MASS INDEX: 57.88 KG/M2 | HEIGHT: 65 IN

## 2025-03-19 DIAGNOSIS — R94.31 ABNORMAL EKG: ICD-10-CM

## 2025-03-19 DIAGNOSIS — K21.9 GASTROESOPHAGEAL REFLUX DISEASE, UNSPECIFIED WHETHER ESOPHAGITIS PRESENT: ICD-10-CM

## 2025-03-19 DIAGNOSIS — E55.9 VITAMIN D DEFICIENCY: ICD-10-CM

## 2025-03-19 DIAGNOSIS — K76.0 FATTY LIVER: ICD-10-CM

## 2025-03-19 LAB
BACTERIA UR CULT: ABNORMAL
ORGANISM: ABNORMAL

## 2025-03-19 PROCEDURE — G8417 CALC BMI ABV UP PARAM F/U: HCPCS | Performed by: PHYSICIAN ASSISTANT

## 2025-03-19 PROCEDURE — G8427 DOCREV CUR MEDS BY ELIG CLIN: HCPCS | Performed by: PHYSICIAN ASSISTANT

## 2025-03-19 PROCEDURE — 1036F TOBACCO NON-USER: CPT | Performed by: PHYSICIAN ASSISTANT

## 2025-03-19 PROCEDURE — 0358T BIA WHOLE BODY: CPT | Performed by: PHYSICIAN ASSISTANT

## 2025-03-19 PROCEDURE — 99214 OFFICE O/P EST MOD 30 MIN: CPT | Performed by: PHYSICIAN ASSISTANT

## 2025-03-19 RX ORDER — OMEPRAZOLE 20 MG/1
20 CAPSULE, DELAYED RELEASE ORAL DAILY
COMMUNITY
Start: 2025-03-17

## 2025-03-19 RX ORDER — SEMAGLUTIDE 1.7 MG/.75ML
INJECTION, SOLUTION SUBCUTANEOUS
COMMUNITY
Start: 2025-02-26

## 2025-03-19 RX ORDER — LOPERAMIDE HYDROCHLORIDE 2 MG/1
CAPSULE ORAL
COMMUNITY
Start: 2025-03-17

## 2025-03-19 RX ORDER — POLYETHYLENE GLYCOL 3350 17 G/17G
POWDER, FOR SOLUTION ORAL
COMMUNITY
Start: 2025-02-26

## 2025-03-19 NOTE — PATIENT INSTRUCTIONS
Goals:  Nutrition Goal:  I will bring back my food journal to record meal times, serving sizes and bring back to next dietitian visit  Suggested eating times: 8:30am-9am-  11:30am- lunch  6pm- dinner meal  9:00p- small snack.  Suggest one 30 gram protein shake per day  Water Goal:  Continue at least 64 oz of water or greater each day-   Exercise Goal:  Continue to use treadmill for 30 minutes most days of the week (4-5 days a week)  5.   Slow down when eating - take 20 minutes to eat as the medication is slowing down your gastro motility.   Avoid fried and greasy foods.  Focus on lean protein foods (see list given to you) and do not go long periods between meals.

## 2025-03-19 NOTE — PROGRESS NOTES
4/19/2025) for Follow up.     I spent over 31 minutes with the patient, with greater that 50% of that time spent on education, counseling and coordination of care.     Electronically signed by RIKA Lion on 3/19/2025 at 11:36 AM

## 2025-03-19 NOTE — PATIENT INSTRUCTIONS
based on [place]. Willing to increase protein- discussed food options and handout given. Eat at least 6 times daily each with 10-15 grams of protein.  Advised at least 80 grams of protein daily. Advised to avoid greasy foods.    To stop wegovy 2 weeks prior to surgery.

## 2025-04-01 ENCOUNTER — OFFICE VISIT (OUTPATIENT)
Dept: CARDIOLOGY CLINIC | Age: 25
End: 2025-04-01
Payer: COMMERCIAL

## 2025-04-01 VITALS
HEIGHT: 65 IN | HEART RATE: 88 BPM | BODY MASS INDEX: 48.82 KG/M2 | WEIGHT: 293 LBS | SYSTOLIC BLOOD PRESSURE: 130 MMHG | DIASTOLIC BLOOD PRESSURE: 78 MMHG

## 2025-04-01 DIAGNOSIS — R06.02 SHORTNESS OF BREATH: Primary | ICD-10-CM

## 2025-04-01 PROCEDURE — 99204 OFFICE O/P NEW MOD 45 MIN: CPT | Performed by: INTERNAL MEDICINE

## 2025-04-01 NOTE — PROGRESS NOTES
[facility]YSICIANS [place] [facility]  [facility] CARDIOLOGY  [address]  [address]  [facility] [address]  Dept: [phone]  Dept Fax: [phone]  Loc: [phone]    Visit Date: 4/1/2025    Chief Complaint   Patient presents with    New Patient       HPI:   Ms. Fuchs is a 25 y.o. female  who presented for:  History of Present Illness  The patient is a 25-year-old female who presents for evaluation of an abnormal EKG.    She was referred to the cardiology clinic following an emergency room visit due to abdominal pain, during which an EKG was performed. The results of the EKG were not disclosed to her at the time of discharge. However, upon her subsequent visit to the weight loss clinic, she was informed of an abnormality in the EKG. She is currently contemplating weight loss surgery, specifically a bypass procedure, and has a scheduled consultation with her physician next month. Her physical activity is not limited, as she can walk any distance without experiencing chest pain or significant shortness of breath. Occasionally, she experiences sharp pains that radiate downwards from her heart.      Current Outpatient Medications:     WEGOVY 1.7 MG/0.75ML SOAJ SC injection, INJECT 1 PEN SUBCUTANEOUSLY ONCE WEEKLY, Disp: , Rfl:     GAVILAX 17 GM/SCOOP powder, as needed, Disp: , Rfl:     omeprazole (PRILOSEC) 20 MG delayed release capsule, Take 1 capsule by mouth as needed, Disp: , Rfl:     loperamide (IMODIUM) 2 MG capsule, Take by mouth as needed, Disp: , Rfl:     vitamin D (ERGOCALCIFEROL) 1.25 MG (69735 UT) CAPS capsule, Take 1 capsule by mouth once a week, Disp: 12 capsule, Rfl: 0    dicyclomine (BENTYL) 10 MG capsule, Take 1 capsule by mouth 4 times daily (before meals and nightly) (Patient not taking: Reported on 4/1/2025), Disp: 40 capsule, Rfl: 0  Past Medical History   has a past medical history of Gallstones, Liver disease, and Polycystic ovary disease.    Social History  Carl  reports

## 2025-04-01 NOTE — PROGRESS NOTES
New patient referred for an abnormal EKG     EKG done 3/18/25    C/o chest pain describes it as sharp

## 2025-04-07 ENCOUNTER — OFFICE VISIT (OUTPATIENT)
Dept: PSYCHOLOGY | Age: 25
End: 2025-04-07
Payer: COMMERCIAL

## 2025-04-07 DIAGNOSIS — E66.01 MORBID OBESITY: ICD-10-CM

## 2025-04-07 DIAGNOSIS — F54 PSYCHOLOGICAL FACTORS AFFECTING MEDICAL CONDITION: Primary | ICD-10-CM

## 2025-04-07 PROCEDURE — 90834 PSYTX W PT 45 MINUTES: CPT | Performed by: PSYCHOLOGIST

## 2025-04-07 ASSESSMENT — PATIENT HEALTH QUESTIONNAIRE - PHQ9
6. FEELING BAD ABOUT YOURSELF - OR THAT YOU ARE A FAILURE OR HAVE LET YOURSELF OR YOUR FAMILY DOWN: NOT AT ALL
3. TROUBLE FALLING OR STAYING ASLEEP: NOT AT ALL
SUM OF ALL RESPONSES TO PHQ QUESTIONS 1-9: 1
7. TROUBLE CONCENTRATING ON THINGS, SUCH AS READING THE NEWSPAPER OR WATCHING TELEVISION: NOT AT ALL
SUM OF ALL RESPONSES TO PHQ QUESTIONS 1-9: 1
4. FEELING TIRED OR HAVING LITTLE ENERGY: NOT AT ALL
8. MOVING OR SPEAKING SO SLOWLY THAT OTHER PEOPLE COULD HAVE NOTICED. OR THE OPPOSITE, BEING SO FIGETY OR RESTLESS THAT YOU HAVE BEEN MOVING AROUND A LOT MORE THAN USUAL: NOT AT ALL
5. POOR APPETITE OR OVEREATING: SEVERAL DAYS
2. FEELING DOWN, DEPRESSED OR HOPELESS: NOT AT ALL
9. THOUGHTS THAT YOU WOULD BE BETTER OFF DEAD, OR OF HURTING YOURSELF: NOT AT ALL
1. LITTLE INTEREST OR PLEASURE IN DOING THINGS: NOT AT ALL

## 2025-04-07 ASSESSMENT — ANXIETY QUESTIONNAIRES
3. WORRYING TOO MUCH ABOUT DIFFERENT THINGS: NOT AT ALL
GAD7 TOTAL SCORE: 0
5. BEING SO RESTLESS THAT IT IS HARD TO SIT STILL: NOT AT ALL
4. TROUBLE RELAXING: NOT AT ALL
2. NOT BEING ABLE TO STOP OR CONTROL WORRYING: NOT AT ALL
6. BECOMING EASILY ANNOYED OR IRRITABLE: NOT AT ALL
7. FEELING AFRAID AS IF SOMETHING AWFUL MIGHT HAPPEN: NOT AT ALL
1. FEELING NERVOUS, ANXIOUS, OR ON EDGE: NOT AT ALL

## 2025-04-07 NOTE — PROGRESS NOTES
beginning weight management.       Binging/Purging/Restrictive Behaviors (including SIV, laxative/stimulant abuse/obsessive exercise): Denies any disordered eating since last reported during intake session.     Pre-surgery Weight Loss Goal/Progress: Patient reports losing over 30lbs since beginning weight management. She denies owning a scale at home. Patient is not currently logging her food intake and was encouraged to use either an celena or the blue journal provided by weight management to begin tracking.     KNOWLEDGE OF SURGERY/GOALS  Carl Fuchs understands the risks and benefits of bariatric surgery. Patient has realistic expectations and is motivated; has identified the following goals/reasons to lose weight: 1) To stay healthy for my kids, and 2) To relieve the symptoms of acid reflux.      Current medical co-morbidities: PCOS and acid reflux    Activity goals: Reduce pain in knees and increase weight bearing exercise.       PSYCHOSOCIAL HISTORY     Education Attainment: high school diploma. Patient denies any diagnosed learning disabilities, but does report struggling with reading in school.         Amish beliefs affecting medical care/transfusion/diet: denies.      experience: denies.               Relevant legal history/current issues: denies.             Currently identified stressors include: Patient reports having a daughter that is not biologically hers, and she is trying to adopt her.     Current coping strategies include: Patient reports, \"I just push through stress.\" Patient then became tearful. Patient will talk to others in her support system when needed.    Most anticipated challenge with the bariatric process (pre or post): Patient is worried about loose skin after surgery. Patient notes that if she continues to have success with Wegovy, she may opt out of surgery.       Supports are maintained as her mother, fiance, and sister.     SUBSTANCE USE HISTORY  Denies any alcohol,

## 2025-04-08 RX ORDER — SODIUM CHLORIDE 0.9 % (FLUSH) 0.9 %
5-40 SYRINGE (ML) INJECTION PRN
Status: DISCONTINUED | OUTPATIENT
Start: 2025-04-08 | End: 2025-04-09 | Stop reason: HOSPADM

## 2025-04-08 RX ORDER — SODIUM CHLORIDE 0.9 % (FLUSH) 0.9 %
5-40 SYRINGE (ML) INJECTION EVERY 12 HOURS SCHEDULED
Status: DISCONTINUED | OUTPATIENT
Start: 2025-04-08 | End: 2025-04-09 | Stop reason: HOSPADM

## 2025-04-08 RX ORDER — SODIUM CHLORIDE 9 MG/ML
25 INJECTION, SOLUTION INTRAVENOUS PRN
Status: DISCONTINUED | OUTPATIENT
Start: 2025-04-08 | End: 2025-04-09 | Stop reason: HOSPADM

## 2025-04-08 RX ORDER — SODIUM CHLORIDE 9 MG/ML
INJECTION, SOLUTION INTRAVENOUS CONTINUOUS
Status: DISCONTINUED | OUTPATIENT
Start: 2025-04-08 | End: 2025-04-09 | Stop reason: HOSPADM

## 2025-04-08 NOTE — H&P
[facility]  Sedation/Analgesia History & Physical    Patient: Carl Fuchs : 2000  Med Rec#: [id number] Acc#: [id number]   Provider Performing Procedure: Marci Christiansen MD  Primary Care Physician: Crystal Jalloh, KAL - [place]    PRE-PROCEDURE   Brief History/Pre-Procedure Diagnosis:The patient is a 25 y.o.,  female with significant past medical history of LUQ pain.  [facility]ric [facility].  [facility].           MEDICAL HISTORY  []CAD/Valve  []Liver Disease  []Lung Disease []Diabetes  []Hypertension []Renal Disease  [x]Additional information:       has a past medical history of Gallstones, Liver disease, and Polycystic ovary disease.    SURGICAL HISTORY   has a past surgical history that includes Tonsillectomy and adenoidectomy; cyst removal (Left); Cholecystectomy, laparoscopic (N/A, 2023); Colonoscopy (N/A, 2023); and Upper gastrointestinal endoscopy (N/A, 2024).  Additional information:       ALLERGIES   Allergies as of 2025 - Fully Reviewed 2025   Allergen Reaction Noted    Acyclovir Hives 03/15/2023    Valacyclovir Swelling 2024     Additional information:       MEDICATIONS       Current Facility-Administered Medications:     sodium chloride flush 0.9 % injection 5-40 mL, 5-40 mL, IntraVENous, 2 times per day, Marci Christiansen MD    sodium chloride flush 0.9 % injection 5-40 mL, 5-40 mL, IntraVENous, PRN, Marci Christiansen MD    0.9 % sodium chloride infusion, 25 mL, IntraVENous, PRN, Marci Christiansen MD    0.9 % sodium chloride infusion, , IntraVENous, Continuous, Marci Christiansen MD, Last Rate: 75 mL/hr at 25 0738, NoRateChange at 25 0738  Prior to Admission medications    Medication Sig Start Date End Date Taking? Authorizing Provider   GAVILAX 17 GM/SCOOP powder as needed 25  Yes Provider, MD Alma   omeprazole (PRILOSEC) 20 MG delayed release capsule Take 1 capsule by mouth as needed

## 2025-04-09 ENCOUNTER — ANESTHESIA EVENT (OUTPATIENT)
Dept: ENDOSCOPY | Age: 25
End: 2025-04-09
Payer: COMMERCIAL

## 2025-04-09 ENCOUNTER — ANESTHESIA (OUTPATIENT)
Dept: ENDOSCOPY | Age: 25
End: 2025-04-09
Payer: COMMERCIAL

## 2025-04-09 ENCOUNTER — HOSPITAL ENCOUNTER (OUTPATIENT)
Age: 25
Setting detail: OUTPATIENT SURGERY
Discharge: HOME | End: 2025-04-09
Attending: INTERNAL MEDICINE | Admitting: INTERNAL MEDICINE
Payer: COMMERCIAL

## 2025-04-09 ENCOUNTER — APPOINTMENT (OUTPATIENT)
Dept: ENDOSCOPY | Age: 25
End: 2025-04-09
Attending: INTERNAL MEDICINE
Payer: COMMERCIAL

## 2025-04-09 VITALS
HEIGHT: 65 IN | BODY MASS INDEX: 48.82 KG/M2 | DIASTOLIC BLOOD PRESSURE: 102 MMHG | TEMPERATURE: 97.5 F | OXYGEN SATURATION: 98 % | SYSTOLIC BLOOD PRESSURE: 139 MMHG | RESPIRATION RATE: 20 BRPM | WEIGHT: 293 LBS | HEART RATE: 85 BPM

## 2025-04-09 LAB — PREGNANCY, URINE: NEGATIVE

## 2025-04-09 PROCEDURE — 81025 URINE PREGNANCY TEST: CPT

## 2025-04-09 PROCEDURE — 2580000003 HC RX 258: Performed by: INTERNAL MEDICINE

## 2025-04-09 RX ADMIN — SODIUM CHLORIDE: 9 INJECTION, SOLUTION INTRAVENOUS at 07:23

## 2025-04-09 ASSESSMENT — PAIN - FUNCTIONAL ASSESSMENT: PAIN_FUNCTIONAL_ASSESSMENT: 0-10

## 2025-04-09 NOTE — PROGRESS NOTES
Scope # GIF *** .  EGD completed, tolerated well. *** biopsies taken, *** jars labeled and sent to lab. Photos taken.

## 2025-04-09 NOTE — PROGRESS NOTES
Informed anesthesia and Dr. Christiansen that once pt was admitted to endo, pt vomited. Pt states that she had been up all night from work and thinks it could be from that. Also states that she takes Wegovy, and stopped taking it on Monday. Per intstructions, pt was to hold Wegovy for 1 week prior to procedure. Per anesthesia, will need to reschedule to later date. Dr. Christiansen discussed at length with pt reasoning to rescheduling due to possible aspiration during procedure. Per Dr. Christiansen, can reschedule pt to Friday 04/11/2025 and intubate pt. Pt voiced that she is agreeable to reschedule with intubation. Will reschedule to Friday 04/11/2025 at 1030. Pt voiced understanding.

## 2025-04-09 NOTE — PROGRESS NOTES
Carl admitted to [facility] for egd with Dr. Christiansen. Consent form signed and verified with pt. Allergies verified with pt. Pt little Corona in waiting room. Jania Rg will be coming later.

## 2025-04-09 NOTE — ANESTHESIA PRE PROCEDURE
Pulmonary:Negative Pulmonary ROS and normal exam                               Cardiovascular:Negative CV ROS            Rhythm: regular                      Neuro/Psych:   Negative Neuro/Psych ROS              GI/Hepatic/Renal:   (+) GERD:, morbid obesity          Endo/Other: Negative Endo/Other ROS                    Abdominal:   (+) obese          Vascular: negative vascular ROS.         Other Findings:             Anesthesia Plan      MAC     ASA 3       Induction: intravenous.          Plan discussed with attending and CRNA.                    Rosa Koehler, KAL - CRNA   4/9/2025

## 2025-04-10 ENCOUNTER — ANESTHESIA EVENT (OUTPATIENT)
Dept: ENDOSCOPY | Age: 25
End: 2025-04-10
Payer: COMMERCIAL

## 2025-04-10 NOTE — PROCEDURES
PROCEDURE NOTE  Date: 2025   Name: Carl Fuchs  YOB: 2000    Procedures    [facility] Endoscopy     EGD Report    Patient: Carl Fuchs  : 2000  Acct#: [id number]     BRIEF HISTORY AND INDICATIONS:    The patient is a 25 y.o.,  female with significant past medical history of LUQ pain.  Pre-bariatric evaluation.On , DID NOT HOLD X 1 WEEK, was rescheduled to today, 2 days after original visit, as she vomited prior to procedure 2 days ago.   in order to be intubated for the procedure.     PREMEDICATION:  GENERAL  anesthesia was used, see Anesthesia note for details.  Pt. On  and did not stop the medication one week prior to procedure.  Was here two days ago and vomited pre procedure.  She  has rescheduled for today , will be intubated for the procedure.     INSTRUMENT:  Olympus GIF H-180 gastroscope    The risks and benefits of upper endoscopy with biopsy and dilation were  described to the patient, including but not limited to bleeding, infection, poking a hole someplace requiring surgery to fix it, having reaction to medication, and death. The patient understood these risks and provided informed consent.  The patient was placed in the left lateral decubitus position.  Conscious sedation was administered .  The patient was continuously monitored to ensure adequate sedation and patient safety.     A forward-viewing Olympus endoscope was lubricated and inserted through the mouth into the oropharynx. Under direct visualization, the upper esophagus was intubated.  The scope was advanced to the esophagus and stomach to second portion of duodenum.  Scope was slowly withdrawn with good views of mucosal surfaces.  The scope was retroflexed in the fundus. Findings and maneuvers are listed in impression below. The patient tolerated the procedure well.  The scope was removed. The patient was removed to the recovery area.              IMPRESSION:      1.   There was about  35 mL of undigested fluid in the stomach, (likely from Wagovy residual).  .      2.  Biopsies of the distal esophagus LA Class A esophagitis.  Biopsies taken in proximal and distal stomach to rule out H. Pylori, mild gastritis.   .      3.  No ulcers or masses noted, no hiatal hernia .     4.   BMI > 50 .       5.  S/p cholecystectomy.          RECOMMENDATIONS:    1.  Await biopsies, pathology.   2. Proceed with workup for Bariatric surgery.   3. Follow up with RODOLFO Doty CNP in office if continued abdominal pain s/p bariatric surgery      Specimens: were obtained    EBL : < 10 mL    (The following sections must be completed)  Post-Sedation Vital Signs: Vital signs were reviewed and were stable after the procedure (see flow sheet for vitals)            Post-Sedation Exam: Lungs: clear to auscultation bilaterally and Cardiovascular: regular rate and rhythm           Complications: none        Marci Christiansen MD, FACG

## 2025-04-10 NOTE — H&P
[facility]  Sedation/Analgesia History & Physical    Patient: Carl Fuchs : 2000  Med Rec#: [id number] Acc#: [id number]   Provider Performing Procedure: Marci Christiansen MD  Primary Care Physician: Crystal Jalloh, KAL - [place]    PRE-PROCEDURE   Brief History/Pre-Procedure Diagnosis:The patient is a 25 y.o.,  female with significant past medical history of LUQ pain.  Pre-bariatric evaluation.  On Wegovy.          MEDICAL HISTORY  []CAD/Valve  []Liver Disease  []Lung Disease []Diabetes  []Hypertension []Renal Disease  [x]Additional information:       has a past medical history of Gallstones, Liver disease, and Polycystic ovary disease.    SURGICAL HISTORY   has a past surgical history that includes Tonsillectomy and adenoidectomy; cyst removal (Left); Cholecystectomy, laparoscopic (N/A, 2023); Colonoscopy (N/A, 2023); and Upper gastrointestinal endoscopy (N/A, 2024).  Additional information:       ALLERGIES   Allergies as of 2025 - Reviewed 2025   Allergen Reaction Noted    Acyclovir Hives 03/15/2023    Valacyclovir Swelling 2024     Additional information:       MEDICATIONS       Current Facility-Administered Medications:     0.9 % sodium chloride infusion, , IntraVENous, Continuous, Marci Christiansen MD, Last Rate: 75 mL/hr at 25 0700, New Bag at 25 0700  Prior to Admission medications    Medication Sig Start Date End Date Taking? Authorizing Provider   WEGOVY 1.7 MG/0.75ML SOAJ SC injection INJECT 1 PEN SUBCUTANEOUSLY ONCE WEEKLY 25  Yes Alma Randolph MD   omeprazole (PRILOSEC) 20 MG delayed release capsule Take 1 capsule by mouth as needed 3/17/25  Yes Alma Randolph MD   vitamin D (ERGOCALCIFEROL) 1.25 MG (40105 UT) CAPS capsule Take 1 capsule by mouth once a week 3/10/25  Yes Yumi Patel PA   GAVILAX 17 GM/SCOOP powder as needed  Patient not taking: Reported on 2025   Alma Randolph MD  standby during the procedure.    [x]Pre-procedure diagnostic studies complete and results available.   Comment:    [x]Previous sedation/anesthesia experiences assessed.   Comment:    [x]The patient is an appropriate candidate to undergo the planned procedure sedation and anesthesia. (Refer to nursing sedation/analgesia documentation record)  [x]Formulation and discussion of sedation/procedure plan, risks, and expectations with patient and/or responsible adult completed.  [x]Patient examined immediately prior to the procedure. (Refer to nursing sedation/analgesia documentation record)    Marci Christiansen MD   Electronically signed 4/11/2025 at 7:02 AM

## 2025-04-11 ENCOUNTER — APPOINTMENT (OUTPATIENT)
Dept: ENDOSCOPY | Age: 25
End: 2025-04-11
Attending: INTERNAL MEDICINE
Payer: COMMERCIAL

## 2025-04-11 ENCOUNTER — ANESTHESIA (OUTPATIENT)
Dept: ENDOSCOPY | Age: 25
End: 2025-04-11
Payer: COMMERCIAL

## 2025-04-11 ENCOUNTER — HOSPITAL ENCOUNTER (OUTPATIENT)
Age: 25
Setting detail: OUTPATIENT SURGERY
Discharge: HOME HEALTH | End: 2025-04-11
Attending: INTERNAL MEDICINE | Admitting: INTERNAL MEDICINE
Payer: COMMERCIAL

## 2025-04-11 VITALS
BODY MASS INDEX: 48.82 KG/M2 | HEIGHT: 65 IN | HEART RATE: 101 BPM | TEMPERATURE: 97.1 F | OXYGEN SATURATION: 91 % | WEIGHT: 293 LBS | RESPIRATION RATE: 20 BRPM | SYSTOLIC BLOOD PRESSURE: 112 MMHG | DIASTOLIC BLOOD PRESSURE: 74 MMHG

## 2025-04-11 LAB — PREGNANCY, URINE: NEGATIVE

## 2025-04-11 PROCEDURE — 6360000002 HC RX W HCPCS

## 2025-04-11 PROCEDURE — 3609012400 HC EGD TRANSORAL BIOPSY SINGLE/MULTIPLE: Performed by: INTERNAL MEDICINE

## 2025-04-11 PROCEDURE — 3700000000 HC ANESTHESIA ATTENDED CARE: Performed by: INTERNAL MEDICINE

## 2025-04-11 PROCEDURE — 7100000010 HC PHASE II RECOVERY - FIRST 15 MIN: Performed by: INTERNAL MEDICINE

## 2025-04-11 PROCEDURE — 2580000003 HC RX 258: Performed by: INTERNAL MEDICINE

## 2025-04-11 PROCEDURE — 2720000010 HC SURG SUPPLY STERILE: Performed by: INTERNAL MEDICINE

## 2025-04-11 PROCEDURE — 2500000003 HC RX 250 WO HCPCS

## 2025-04-11 PROCEDURE — 7100000011 HC PHASE II RECOVERY - ADDTL 15 MIN: Performed by: INTERNAL MEDICINE

## 2025-04-11 PROCEDURE — 88305 TISSUE EXAM BY PATHOLOGIST: CPT

## 2025-04-11 PROCEDURE — 81025 URINE PREGNANCY TEST: CPT

## 2025-04-11 PROCEDURE — 88342 IMHCHEM/IMCYTCHM 1ST ANTB: CPT

## 2025-04-11 PROCEDURE — 7100000001 HC PACU RECOVERY - ADDTL 15 MIN: Performed by: INTERNAL MEDICINE

## 2025-04-11 PROCEDURE — 3700000001 HC ADD 15 MINUTES (ANESTHESIA): Performed by: INTERNAL MEDICINE

## 2025-04-11 PROCEDURE — 7100000000 HC PACU RECOVERY - FIRST 15 MIN: Performed by: INTERNAL MEDICINE

## 2025-04-11 RX ORDER — SUCCINYLCHOLINE/SOD CL,ISO/PF 200MG/10ML
SYRINGE (ML) INTRAVENOUS
Status: DISCONTINUED | OUTPATIENT
Start: 2025-04-11 | End: 2025-04-11 | Stop reason: SDUPTHER

## 2025-04-11 RX ORDER — FENTANYL CITRATE 50 UG/ML
INJECTION, SOLUTION INTRAMUSCULAR; INTRAVENOUS
Status: DISCONTINUED | OUTPATIENT
Start: 2025-04-11 | End: 2025-04-11 | Stop reason: SDUPTHER

## 2025-04-11 RX ORDER — PROPOFOL 10 MG/ML
INJECTION, EMULSION INTRAVENOUS
Status: DISCONTINUED | OUTPATIENT
Start: 2025-04-11 | End: 2025-04-11 | Stop reason: SDUPTHER

## 2025-04-11 RX ORDER — ONDANSETRON 2 MG/ML
INJECTION INTRAMUSCULAR; INTRAVENOUS
Status: DISCONTINUED | OUTPATIENT
Start: 2025-04-11 | End: 2025-04-11 | Stop reason: SDUPTHER

## 2025-04-11 RX ORDER — DEXAMETHASONE SODIUM PHOSPHATE 4 MG/ML
INJECTION, SOLUTION INTRA-ARTICULAR; INTRALESIONAL; INTRAMUSCULAR; INTRAVENOUS; SOFT TISSUE
Status: DISCONTINUED | OUTPATIENT
Start: 2025-04-11 | End: 2025-04-11 | Stop reason: SDUPTHER

## 2025-04-11 RX ORDER — ROCURONIUM BROMIDE 10 MG/ML
INJECTION, SOLUTION INTRAVENOUS
Status: DISCONTINUED | OUTPATIENT
Start: 2025-04-11 | End: 2025-04-11 | Stop reason: SDUPTHER

## 2025-04-11 RX ORDER — MIDAZOLAM HYDROCHLORIDE 1 MG/ML
INJECTION, SOLUTION INTRAMUSCULAR; INTRAVENOUS
Status: DISCONTINUED | OUTPATIENT
Start: 2025-04-11 | End: 2025-04-11 | Stop reason: SDUPTHER

## 2025-04-11 RX ORDER — SODIUM CHLORIDE 9 MG/ML
INJECTION, SOLUTION INTRAVENOUS CONTINUOUS
Status: DISCONTINUED | OUTPATIENT
Start: 2025-04-11 | End: 2025-04-11 | Stop reason: HOSPADM

## 2025-04-11 RX ADMIN — SODIUM CHLORIDE: 0.9 INJECTION, SOLUTION INTRAVENOUS at 07:00

## 2025-04-11 RX ADMIN — SUGAMMADEX 400 MG: 100 INJECTION, SOLUTION INTRAVENOUS at 07:20

## 2025-04-11 RX ADMIN — Medication 140 MG: at 07:06

## 2025-04-11 RX ADMIN — ONDANSETRON 4 MG: 2 INJECTION, SOLUTION INTRAMUSCULAR; INTRAVENOUS at 07:10

## 2025-04-11 RX ADMIN — FENTANYL CITRATE 50 MCG: 50 INJECTION, SOLUTION INTRAMUSCULAR; INTRAVENOUS at 07:20

## 2025-04-11 RX ADMIN — ROCURONIUM BROMIDE 10 MG: 50 INJECTION INTRAVENOUS at 07:06

## 2025-04-11 RX ADMIN — ROCURONIUM BROMIDE 40 MG: 50 INJECTION INTRAVENOUS at 07:09

## 2025-04-11 RX ADMIN — PROPOFOL 150 MG: 10 INJECTION, EMULSION INTRAVENOUS at 07:06

## 2025-04-11 RX ADMIN — DEXAMETHASONE SODIUM PHOSPHATE 8 MG: 4 INJECTION, SOLUTION INTRAMUSCULAR; INTRAVENOUS at 07:10

## 2025-04-11 RX ADMIN — FENTANYL CITRATE 50 MCG: 50 INJECTION, SOLUTION INTRAMUSCULAR; INTRAVENOUS at 07:06

## 2025-04-11 RX ADMIN — MIDAZOLAM 2 MG: 1 INJECTION INTRAMUSCULAR; INTRAVENOUS at 07:03

## 2025-04-11 ASSESSMENT — PAIN SCALES - WONG BAKER
WONGBAKER_NUMERICALRESPONSE: NO HURT
WONGBAKER_NUMERICALRESPONSE: NO HURT

## 2025-04-11 ASSESSMENT — PAIN - FUNCTIONAL ASSESSMENT
PAIN_FUNCTIONAL_ASSESSMENT: 0-10
PAIN_FUNCTIONAL_ASSESSMENT: NONE - DENIES PAIN

## 2025-04-11 NOTE — ANESTHESIA PRE PROCEDURE
CMP:   Lab Results   Component Value Date/Time     03/18/2025 10:54 AM    K 4.7 03/18/2025 10:54 AM    K 3.7 01/24/2025 05:29 AM     03/18/2025 10:54 AM    CO2 20 03/18/2025 10:54 AM    BUN 12 03/18/2025 10:54 AM    CREATININE 0.8 03/18/2025 10:54 AM    GFRAA >60 12/22/2021 10:55 AM    LABGLOM > 90 03/18/2025 10:54 AM    LABGLOM >90 04/24/2024 05:45 PM    GLUCOSE 89 03/18/2025 10:54 AM    CALCIUM 9.4 03/18/2025 10:54 AM    BILITOT 0.4 01/24/2025 05:29 AM    ALKPHOS 40 01/24/2025 05:29 AM    AST 11 01/24/2025 05:29 AM    ALT 10 01/24/2025 05:29 AM       POC Tests: No results for input(s): \"POCGLU\", \"POCNA\", \"POCK\", \"POCCL\", \"POCBUN\", \"POCHEMO\", \"POCHCT\" in the last 72 hours.    Coags:   Lab Results   Component Value Date/Time    INR 0.97 03/06/2025 10:53 AM       HCG (If Applicable):   Lab Results   Component Value Date    PREGTESTUR negative 04/09/2025    PREGSERUM NEGATIVE 03/18/2025        ABGs: No results found for: \"PHART\", \"PO2ART\", \"GVB4JEX\", \"MYH6WAF\", \"BEART\", \"W6ODCGSN\"     Type & Screen (If Applicable):  No results found for: \"LABABO\"      Drug/Infectious Status (If Applicable):  No results found for: \"HIV\", \"HEPCAB\"    COVID-19 Screening (If Applicable):   Lab Results   Component Value Date/Time    COVID19 NOT DETECTED 06/05/2023 06:20 PM           Anesthesia Evaluation  Patient summary reviewed   no history of anesthetic complications:   Airway: Mallampati: II  TM distance: >3 FB   Neck ROM: full  Mouth opening: > = 3 FB   Dental: normal exam         Pulmonary:Negative Pulmonary ROS and normal exam                               Cardiovascular:Negative CV ROS  Exercise tolerance: good (>4 METS)        ECG reviewed  Rhythm: regular                      Neuro/Psych:   Negative Neuro/Psych ROS              GI/Hepatic/Renal:   (+) GERD:, liver disease:, morbid obesity          Endo/Other: Negative Endo/Other ROS                    Abdominal:   (+) obese          Vascular: negative vascular

## 2025-04-11 NOTE — ANESTHESIA POSTPROCEDURE EVALUATION
Department of Anesthesiology  Postprocedure Note    Patient: Carl Fuchs  MRN: [id number]  YOB: 2000  Date of evaluation: 4/11/2025    Procedure Summary       Date: 04/11/25 Room / Location: [place] [address] / [facility]    Anesthesia Start: 0702 Anesthesia Stop: 0727    Procedure: ESOPHAGOGASTRODUODENOSCOPY BIOPSY Diagnosis:       Left upper quadrant pain      Preop examination    Surgeons: Marci hCristiansen MD Responsible Provider: Boy Garcia MD    Anesthesia Type: general ASA Status: 3            Anesthesia Type: No value filed.    Prudencio Phase I: Prudencio Score: 8    Prudencio Phase II:      Anesthesia Post Evaluation    Patient location during evaluation: PACU  Patient participation: complete - patient participated  Level of consciousness: awake  Pain score: 0  Airway patency: patent  Nausea & Vomiting: no nausea and no vomiting  Cardiovascular status: hemodynamically stable and blood pressure returned to baseline  Respiratory status: room air, nonlabored ventilation and spontaneous ventilation  Hydration status: stable        No notable events documented.

## 2025-04-11 NOTE — PROGRESS NOTES
0726 pt arrived to PACU, awakens to voice and denies pain. VSS  0740 pt awake in bed, denies pain. VSS  0750 pt resting off and on. VSS  0756 meets criteria for discharge from PACU, transported to [facility] recovery in stable condition

## 2025-04-11 NOTE — PROGRESS NOTES
EGD completed, pictures taken. Biopsies taken, 2 Jars labeled and sent to lab.  Pt tolerated well.       Scope  Used.

## 2025-04-11 NOTE — PROGRESS NOTES
Carl in phase 2 from egd with . Family/ at bedside with pt.  discussed findings with pt. Discharge insturctions discussed with pt. Discharge papers signed and given to pt. Pt discharged home/facility.

## 2025-04-11 NOTE — DISCHARGE INSTRUCTIONS
IMPRESSION:      1.   There was about 35 mL of undigested fluid in the stomach, (likely from Wagovy residual).  .      2.  Biopsies of the distal esophagus LA Class A esophagitis.  Biopsies taken in proximal and distal stomach to rule out H. Pylori, mild gastritis.   .      3.  No ulcers or masses noted, no hiatal hernia .     4.   BMI > 50 .       5.  S/p cholecystectomy.          RECOMMENDATIONS:    1.  Await biopsies, pathology.   2. Proceed with workup for Bariatric surgery.   3. Follow up with RODOLFO Doty CNP in office if continued abdominal pain s/p bariatric surgery

## 2025-04-12 ENCOUNTER — APPOINTMENT (OUTPATIENT)
Dept: GENERAL RADIOLOGY | Age: 25
End: 2025-04-12
Payer: COMMERCIAL

## 2025-04-12 ENCOUNTER — APPOINTMENT (OUTPATIENT)
Dept: INTERVENTIONAL RADIOLOGY/VASCULAR | Age: 25
End: 2025-04-12
Payer: COMMERCIAL

## 2025-04-12 ENCOUNTER — HOSPITAL ENCOUNTER (OUTPATIENT)
Age: 25
Setting detail: OBSERVATION
Discharge: HOME | End: 2025-04-12
Attending: EMERGENCY MEDICINE | Admitting: STUDENT IN AN ORGANIZED HEALTH CARE EDUCATION/TRAINING PROGRAM
Payer: COMMERCIAL

## 2025-04-12 ENCOUNTER — HOSPITAL ENCOUNTER (EMERGENCY)
Age: 25
Discharge: HOME | End: 2025-04-12
Attending: EMERGENCY MEDICINE
Payer: COMMERCIAL

## 2025-04-12 VITALS
RESPIRATION RATE: 20 BRPM | BODY MASS INDEX: 48.82 KG/M2 | OXYGEN SATURATION: 96 % | HEIGHT: 65 IN | DIASTOLIC BLOOD PRESSURE: 87 MMHG | HEART RATE: 72 BPM | SYSTOLIC BLOOD PRESSURE: 134 MMHG | TEMPERATURE: 97.9 F | WEIGHT: 293 LBS

## 2025-04-12 VITALS
RESPIRATION RATE: 20 BRPM | TEMPERATURE: 97.9 F | OXYGEN SATURATION: 100 % | HEART RATE: 77 BPM | SYSTOLIC BLOOD PRESSURE: 129 MMHG | DIASTOLIC BLOOD PRESSURE: 78 MMHG

## 2025-04-12 DIAGNOSIS — M62.82 NON-TRAUMATIC RHABDOMYOLYSIS: ICD-10-CM

## 2025-04-12 DIAGNOSIS — R52 GENERALIZED PAIN: Primary | ICD-10-CM

## 2025-04-12 DIAGNOSIS — M79.10 MUSCLE SORENESS: Primary | ICD-10-CM

## 2025-04-12 LAB
ALBUMIN SERPL BCG-MCNC: 4.2 G/DL (ref 3.4–4.9)
ALP SERPL-CCNC: 47 U/L (ref 38–126)
ALT SERPL W/O P-5'-P-CCNC: 13 U/L (ref 10–35)
AMPHETAMINES UR QL SCN: NEGATIVE
ANION GAP SERPL CALC-SCNC: 10 MEQ/L (ref 8–16)
ANION GAP SERPL CALC-SCNC: 10 MEQ/L (ref 8–16)
AST SERPL-CCNC: 35 U/L (ref 10–35)
BACTERIA: ABNORMAL
BARBITURATES UR QL SCN: NEGATIVE
BASOPHILS ABSOLUTE: 0 THOU/MM3 (ref 0–0.1)
BASOPHILS ABSOLUTE: 0 THOU/MM3 (ref 0–0.1)
BASOPHILS NFR BLD AUTO: 0.1 %
BASOPHILS NFR BLD AUTO: 0.2 %
BENZODIAZ UR QL SCN: POSITIVE
BILIRUB SERPL-MCNC: 0.3 MG/DL (ref 0.3–1.2)
BILIRUB UR QL STRIP: NEGATIVE
BUN SERPL-MCNC: 11 MG/DL (ref 8–23)
BUN SERPL-MCNC: 12 MG/DL (ref 8–23)
BZE UR QL SCN: NEGATIVE
CA-I BLD ISE-SCNC: 1.19 MMOL/L (ref 1.12–1.32)
CALCIUM SERPL-MCNC: 9.4 MG/DL (ref 8.6–10)
CALCIUM SERPL-MCNC: 9.5 MG/DL (ref 8.6–10)
CANNABINOIDS UR QL SCN: NEGATIVE
CASTS #/AREA URNS LPF: ABNORMAL /LPF
CASTS #/AREA URNS LPF: ABNORMAL /LPF
CHARACTER UR: ABNORMAL
CHARCOAL URNS QL MICRO: ABNORMAL
CHLORIDE SERPL-SCNC: 104 MEQ/L (ref 98–111)
CHLORIDE SERPL-SCNC: 110 MEQ/L (ref 98–111)
CK SERPL-CCNC: 681 U/L (ref 26–192)
CK SERPL-CCNC: 828 U/L (ref 26–192)
CO2 SERPL-SCNC: 23 MEQ/L (ref 22–29)
CO2 SERPL-SCNC: 26 MEQ/L (ref 22–29)
COLOR UR: ABNORMAL
CORTIS SERPL-MCNC: 0.5 UG/DL
CORTISOL COLLECTION INFO: NORMAL
CREAT SERPL-MCNC: 0.7 MG/DL (ref 0.5–0.9)
CREAT SERPL-MCNC: 0.8 MG/DL (ref 0.5–0.9)
CRP SERPL-MCNC: 0.47 MG/DL (ref 0–0.5)
CRYSTALS URNS QL MICRO: ABNORMAL
DEPRECATED RDW RBC AUTO: 39.5 FL (ref 35–45)
DEPRECATED RDW RBC AUTO: 40.6 FL (ref 35–45)
ECHO BSA: 2.73 M2
EOSINOPHIL NFR BLD AUTO: 0.3 %
EOSINOPHIL NFR BLD AUTO: 0.7 %
EOSINOPHILS ABSOLUTE: 0 THOU/MM3 (ref 0–0.4)
EOSINOPHILS ABSOLUTE: 0.1 THOU/MM3 (ref 0–0.4)
EPITHELIAL CELLS, UA: ABNORMAL /HPF
ERYTHROCYTE [DISTWIDTH] IN BLOOD BY AUTOMATED COUNT: 12.1 % (ref 11.5–14.5)
ERYTHROCYTE [DISTWIDTH] IN BLOOD BY AUTOMATED COUNT: 12.2 % (ref 11.5–14.5)
ERYTHROCYTE [SEDIMENTATION RATE] IN BLOOD BY WESTERGREN METHOD: 15 MM/HR (ref 0–20)
FENTANYL: POSITIVE
FOLATE SERPL-MCNC: 8.9 NG/ML (ref 4.6–34.8)
GFR SERPL CREATININE-BSD FRML MDRD: > 90 ML/MIN/1.73M2
GFR SERPL CREATININE-BSD FRML MDRD: > 90 ML/MIN/1.73M2
GLUCOSE SERPL-MCNC: 84 MG/DL (ref 74–109)
GLUCOSE SERPL-MCNC: 95 MG/DL (ref 74–109)
GLUCOSE UR QL STRIP.AUTO: NEGATIVE MG/DL
HCT VFR BLD AUTO: 39.6 % (ref 37–47)
HCT VFR BLD AUTO: 39.9 % (ref 37–47)
HGB BLD-MCNC: 12.6 GM/DL (ref 12–16)
HGB BLD-MCNC: 12.8 GM/DL (ref 12–16)
HGB UR QL STRIP.AUTO: NEGATIVE
HGB UR QL STRIP.AUTO: NEGATIVE
IMM GRANULOCYTES # BLD AUTO: 0.04 THOU/MM3 (ref 0–0.07)
IMM GRANULOCYTES # BLD AUTO: 0.05 THOU/MM3 (ref 0–0.07)
IMM GRANULOCYTES NFR BLD AUTO: 0.3 %
IMM GRANULOCYTES NFR BLD AUTO: 0.4 %
KETONES UR QL STRIP.AUTO: ABNORMAL
LACTATE SERPL-SCNC: 1 MMOL/L (ref 0.5–2)
LDH SERPL L TO P-CCNC: 154 U/L (ref 135–214)
LEUKOCYTE ESTERASE UR QL STRIP.AUTO: NEGATIVE
LYMPHOCYTES ABSOLUTE: 3.3 THOU/MM3 (ref 1–4.8)
LYMPHOCYTES ABSOLUTE: 4.5 THOU/MM3 (ref 1–4.8)
LYMPHOCYTES NFR BLD AUTO: 22.5 %
LYMPHOCYTES NFR BLD AUTO: 33.7 %
MAGNESIUM SERPL-MCNC: 2.1 MG/DL (ref 1.6–2.6)
MCH RBC QN AUTO: 28.8 PG (ref 26–33)
MCH RBC QN AUTO: 29.1 PG (ref 26–33)
MCHC RBC AUTO-ENTMCNC: 31.8 GM/DL (ref 32.2–35.5)
MCHC RBC AUTO-ENTMCNC: 32.1 GM/DL (ref 32.2–35.5)
MCV RBC AUTO: 89.7 FL (ref 81–99)
MCV RBC AUTO: 91.5 FL (ref 81–99)
MONOCYTES ABSOLUTE: 0.8 THOU/MM3 (ref 0.4–1.3)
MONOCYTES ABSOLUTE: 1.1 THOU/MM3 (ref 0.4–1.3)
MONOCYTES NFR BLD AUTO: 6 %
MONOCYTES NFR BLD AUTO: 7.5 %
NEUTROPHILS ABSOLUTE: 10.3 THOU/MM3 (ref 1.8–7.7)
NEUTROPHILS ABSOLUTE: 7.9 THOU/MM3 (ref 1.8–7.7)
NEUTROPHILS NFR BLD AUTO: 59 %
NEUTROPHILS NFR BLD AUTO: 69.3 %
NITRITE UR QL STRIP.AUTO: NEGATIVE
NRBC BLD AUTO-RTO: 0 /100 WBC
NRBC BLD AUTO-RTO: 0 /100 WBC
OPIATES UR QL SCN: POSITIVE
OSMOLALITY SERPL CALC.SUM OF ELEC: 278 MOSMOL/KG (ref 275–300)
OSMOLALITY SERPL: 294 MOSMOL/KG (ref 275–295)
OXYCODONE: NEGATIVE
PCP UR QL SCN: NEGATIVE
PH UR STRIP.AUTO: 6 [PH] (ref 5–9)
PHOSPHATE SERPL-MCNC: 3.8 MG/DL (ref 2.5–4.5)
PLATELET # BLD AUTO: 328 THOU/MM3 (ref 130–400)
PLATELET # BLD AUTO: 349 THOU/MM3 (ref 130–400)
PMV BLD AUTO: 10.2 FL (ref 9.4–12.4)
PMV BLD AUTO: 9.9 FL (ref 9.4–12.4)
POTASSIUM SERPL-SCNC: 3.7 MEQ/L (ref 3.5–5.2)
POTASSIUM SERPL-SCNC: 3.8 MEQ/L (ref 3.5–5.2)
PROCALCITONIN SERPL IA-MCNC: < 0.02 NG/ML (ref 0.01–0.09)
PROT SERPL-MCNC: 7.2 G/DL (ref 6.4–8.3)
PROT UR STRIP.AUTO-MCNC: 30 MG/DL
RBC # BLD AUTO: 4.33 MILL/MM3 (ref 4.2–5.4)
RBC # BLD AUTO: 4.45 MILL/MM3 (ref 4.2–5.4)
RBC #/AREA URNS HPF: ABNORMAL /HPF
RENAL EPI CELLS #/AREA URNS HPF: ABNORMAL /[HPF]
SODIUM SERPL-SCNC: 140 MEQ/L (ref 135–145)
SODIUM SERPL-SCNC: 143 MEQ/L (ref 135–145)
SP GR UR REFRACT.AUTO: > 1.03 (ref 1–1.03)
TSH SERPL DL<=0.05 MIU/L-ACNC: 1.55 UIU/ML (ref 0.27–4.2)
URATE SERPL-MCNC: 5.2 MG/DL (ref 2.4–5.7)
UROBILINOGEN UR QL STRIP.AUTO: 1 EU/DL (ref 0–1)
VIT B12 SERPL-MCNC: 430 PG/ML (ref 232–1245)
WBC # BLD AUTO: 13.4 THOU/MM3 (ref 4.8–10.8)
WBC # BLD AUTO: 14.8 THOU/MM3 (ref 4.8–10.8)
WBC #/AREA URNS HPF: ABNORMAL /HPF
YEAST LIKE FUNGI URNS QL MICRO: ABNORMAL

## 2025-04-12 PROCEDURE — 83874 ASSAY OF MYOGLOBIN: CPT

## 2025-04-12 PROCEDURE — G0378 HOSPITAL OBSERVATION PER HR: HCPCS

## 2025-04-12 PROCEDURE — 82533 TOTAL CORTISOL: CPT

## 2025-04-12 PROCEDURE — 99223 1ST HOSP IP/OBS HIGH 75: CPT | Performed by: NURSE PRACTITIONER

## 2025-04-12 PROCEDURE — 84100 ASSAY OF PHOSPHORUS: CPT

## 2025-04-12 PROCEDURE — 6360000002 HC RX W HCPCS

## 2025-04-12 PROCEDURE — 84145 PROCALCITONIN (PCT): CPT

## 2025-04-12 PROCEDURE — 84550 ASSAY OF BLOOD/URIC ACID: CPT

## 2025-04-12 PROCEDURE — 93005 ELECTROCARDIOGRAM TRACING: CPT | Performed by: EMERGENCY MEDICINE

## 2025-04-12 PROCEDURE — 81001 URINALYSIS AUTO W/SCOPE: CPT

## 2025-04-12 PROCEDURE — 6360000002 HC RX W HCPCS: Performed by: EMERGENCY MEDICINE

## 2025-04-12 PROCEDURE — 96361 HYDRATE IV INFUSION ADD-ON: CPT

## 2025-04-12 PROCEDURE — 83930 ASSAY OF BLOOD OSMOLALITY: CPT

## 2025-04-12 PROCEDURE — 82550 ASSAY OF CK (CPK): CPT

## 2025-04-12 PROCEDURE — 71045 X-RAY EXAM CHEST 1 VIEW: CPT

## 2025-04-12 PROCEDURE — 36415 COLL VENOUS BLD VENIPUNCTURE: CPT

## 2025-04-12 PROCEDURE — 96375 TX/PRO/DX INJ NEW DRUG ADDON: CPT

## 2025-04-12 PROCEDURE — 85651 RBC SED RATE NONAUTOMATED: CPT

## 2025-04-12 PROCEDURE — 99285 EMERGENCY DEPT VISIT HI MDM: CPT

## 2025-04-12 PROCEDURE — NBSRV NON-BILLABLE SERVICE: Performed by: PHYSICIAN ASSISTANT

## 2025-04-12 PROCEDURE — 96372 THER/PROPH/DIAG INJ SC/IM: CPT

## 2025-04-12 PROCEDURE — 83615 LACTATE (LD) (LDH) ENZYME: CPT

## 2025-04-12 PROCEDURE — 86140 C-REACTIVE PROTEIN: CPT

## 2025-04-12 PROCEDURE — 83735 ASSAY OF MAGNESIUM: CPT

## 2025-04-12 PROCEDURE — 80053 COMPREHEN METABOLIC PANEL: CPT

## 2025-04-12 PROCEDURE — G0378 HOSPITAL OBSERVATION PER HR: HCPCS | Performed by: FAMILY MEDICINE

## 2025-04-12 PROCEDURE — 2580000003 HC RX 258: Performed by: EMERGENCY MEDICINE

## 2025-04-12 PROCEDURE — 82746 ASSAY OF FOLIC ACID SERUM: CPT

## 2025-04-12 PROCEDURE — 83605 ASSAY OF LACTIC ACID: CPT

## 2025-04-12 PROCEDURE — 93971 EXTREMITY STUDY: CPT

## 2025-04-12 PROCEDURE — 80307 DRUG TEST PRSMV CHEM ANLYZR: CPT

## 2025-04-12 PROCEDURE — 84443 ASSAY THYROID STIM HORMONE: CPT

## 2025-04-12 PROCEDURE — 96374 THER/PROPH/DIAG INJ IV PUSH: CPT

## 2025-04-12 PROCEDURE — 82607 VITAMIN B-12: CPT

## 2025-04-12 PROCEDURE — 85025 COMPLETE CBC W/AUTO DIFF WBC: CPT

## 2025-04-12 PROCEDURE — 99283 EMERGENCY DEPT VISIT LOW MDM: CPT

## 2025-04-12 PROCEDURE — 82330 ASSAY OF CALCIUM: CPT

## 2025-04-12 PROCEDURE — 71046 X-RAY EXAM CHEST 2 VIEWS: CPT

## 2025-04-12 RX ORDER — OXYCODONE HYDROCHLORIDE 5 MG/1
5 TABLET ORAL EVERY 6 HOURS PRN
Qty: 8 TABLET | Refills: 0 | Status: SHIPPED | OUTPATIENT
Start: 2025-04-12 | End: 2025-04-15

## 2025-04-12 RX ORDER — 0.9 % SODIUM CHLORIDE 0.9 %
1000 INTRAVENOUS SOLUTION INTRAVENOUS ONCE
Status: COMPLETED | OUTPATIENT
Start: 2025-04-12 | End: 2025-04-12

## 2025-04-12 RX ORDER — KETOROLAC TROMETHAMINE 30 MG/ML
30 INJECTION, SOLUTION INTRAMUSCULAR; INTRAVENOUS ONCE
Status: DISCONTINUED | OUTPATIENT
Start: 2025-04-12 | End: 2025-04-12

## 2025-04-12 RX ORDER — PANTOPRAZOLE SODIUM 40 MG/10ML
40 INJECTION, POWDER, LYOPHILIZED, FOR SOLUTION INTRAVENOUS DAILY
Status: DISCONTINUED | OUTPATIENT
Start: 2025-04-12 | End: 2025-04-12 | Stop reason: HOSPADM

## 2025-04-12 RX ORDER — SODIUM CHLORIDE 9 MG/ML
INJECTION, SOLUTION INTRAVENOUS PRN
Status: DISCONTINUED | OUTPATIENT
Start: 2025-04-12 | End: 2025-04-12 | Stop reason: HOSPADM

## 2025-04-12 RX ORDER — KETOROLAC TROMETHAMINE 30 MG/ML
30 INJECTION, SOLUTION INTRAMUSCULAR; INTRAVENOUS ONCE
Status: COMPLETED | OUTPATIENT
Start: 2025-04-12 | End: 2025-04-12

## 2025-04-12 RX ORDER — POLYETHYLENE GLYCOL 3350 17 G/17G
17 POWDER, FOR SOLUTION ORAL DAILY PRN
Status: DISCONTINUED | OUTPATIENT
Start: 2025-04-12 | End: 2025-04-12 | Stop reason: HOSPADM

## 2025-04-12 RX ORDER — ONDANSETRON 2 MG/ML
4 INJECTION INTRAMUSCULAR; INTRAVENOUS EVERY 6 HOURS PRN
Status: DISCONTINUED | OUTPATIENT
Start: 2025-04-12 | End: 2025-04-12 | Stop reason: HOSPADM

## 2025-04-12 RX ORDER — SODIUM CHLORIDE 0.9 % (FLUSH) 0.9 %
5-40 SYRINGE (ML) INJECTION PRN
Status: DISCONTINUED | OUTPATIENT
Start: 2025-04-12 | End: 2025-04-12 | Stop reason: HOSPADM

## 2025-04-12 RX ORDER — POTASSIUM CHLORIDE 7.45 MG/ML
10 INJECTION INTRAVENOUS PRN
Status: DISCONTINUED | OUTPATIENT
Start: 2025-04-12 | End: 2025-04-12 | Stop reason: HOSPADM

## 2025-04-12 RX ORDER — ENOXAPARIN SODIUM 100 MG/ML
40 INJECTION SUBCUTANEOUS 2 TIMES DAILY
Status: DISCONTINUED | OUTPATIENT
Start: 2025-04-12 | End: 2025-04-12 | Stop reason: HOSPADM

## 2025-04-12 RX ORDER — ONDANSETRON 2 MG/ML
4 INJECTION INTRAMUSCULAR; INTRAVENOUS ONCE
Status: COMPLETED | OUTPATIENT
Start: 2025-04-12 | End: 2025-04-12

## 2025-04-12 RX ORDER — ONDANSETRON 4 MG/1
4 TABLET, ORALLY DISINTEGRATING ORAL EVERY 8 HOURS PRN
Status: DISCONTINUED | OUTPATIENT
Start: 2025-04-12 | End: 2025-04-12 | Stop reason: HOSPADM

## 2025-04-12 RX ORDER — POTASSIUM CHLORIDE 1500 MG/1
40 TABLET, EXTENDED RELEASE ORAL PRN
Status: DISCONTINUED | OUTPATIENT
Start: 2025-04-12 | End: 2025-04-12 | Stop reason: HOSPADM

## 2025-04-12 RX ORDER — SODIUM CHLORIDE, SODIUM LACTATE, POTASSIUM CHLORIDE, CALCIUM CHLORIDE 600; 310; 30; 20 MG/100ML; MG/100ML; MG/100ML; MG/100ML
INJECTION, SOLUTION INTRAVENOUS CONTINUOUS
Status: DISCONTINUED | OUTPATIENT
Start: 2025-04-12 | End: 2025-04-12 | Stop reason: HOSPADM

## 2025-04-12 RX ORDER — MAGNESIUM SULFATE IN WATER 40 MG/ML
2000 INJECTION, SOLUTION INTRAVENOUS PRN
Status: DISCONTINUED | OUTPATIENT
Start: 2025-04-12 | End: 2025-04-12 | Stop reason: HOSPADM

## 2025-04-12 RX ORDER — SODIUM CHLORIDE 0.9 % (FLUSH) 0.9 %
5-40 SYRINGE (ML) INJECTION EVERY 12 HOURS SCHEDULED
Status: DISCONTINUED | OUTPATIENT
Start: 2025-04-12 | End: 2025-04-12 | Stop reason: HOSPADM

## 2025-04-12 RX ORDER — MORPHINE SULFATE 4 MG/ML
4 INJECTION, SOLUTION INTRAMUSCULAR; INTRAVENOUS ONCE
Refills: 0 | Status: COMPLETED | OUTPATIENT
Start: 2025-04-12 | End: 2025-04-12

## 2025-04-12 RX ORDER — ACETAMINOPHEN 650 MG/1
650 SUPPOSITORY RECTAL EVERY 6 HOURS PRN
Status: DISCONTINUED | OUTPATIENT
Start: 2025-04-12 | End: 2025-04-12 | Stop reason: HOSPADM

## 2025-04-12 RX ORDER — ACETAMINOPHEN 325 MG/1
650 TABLET ORAL EVERY 6 HOURS PRN
Status: DISCONTINUED | OUTPATIENT
Start: 2025-04-12 | End: 2025-04-12 | Stop reason: HOSPADM

## 2025-04-12 RX ORDER — OXYCODONE HYDROCHLORIDE 5 MG/1
5 TABLET ORAL EVERY 6 HOURS PRN
Refills: 0 | Status: DISCONTINUED | OUTPATIENT
Start: 2025-04-12 | End: 2025-04-12 | Stop reason: HOSPADM

## 2025-04-12 RX ADMIN — KETOROLAC TROMETHAMINE 30 MG: 30 INJECTION, SOLUTION INTRAMUSCULAR at 03:11

## 2025-04-12 RX ADMIN — KETOROLAC TROMETHAMINE 30 MG: 30 INJECTION, SOLUTION INTRAMUSCULAR at 21:25

## 2025-04-12 RX ADMIN — MORPHINE SULFATE 4 MG: 4 INJECTION, SOLUTION INTRAMUSCULAR; INTRAVENOUS at 05:23

## 2025-04-12 RX ADMIN — SODIUM CHLORIDE 1000 ML: 0.9 INJECTION, SOLUTION INTRAVENOUS at 03:10

## 2025-04-12 RX ADMIN — SODIUM CHLORIDE 1000 ML: 0.9 INJECTION, SOLUTION INTRAVENOUS at 05:31

## 2025-04-12 RX ADMIN — ONDANSETRON 4 MG: 2 INJECTION, SOLUTION INTRAMUSCULAR; INTRAVENOUS at 05:23

## 2025-04-12 ASSESSMENT — PAIN DESCRIPTION - DESCRIPTORS: DESCRIPTORS: ACHING

## 2025-04-12 ASSESSMENT — PAIN SCALES - GENERAL
PAINLEVEL_OUTOF10: 10
PAINLEVEL_OUTOF10: 8

## 2025-04-12 ASSESSMENT — PAIN DESCRIPTION - ORIENTATION: ORIENTATION: OTHER (COMMENT)

## 2025-04-12 ASSESSMENT — PAIN - FUNCTIONAL ASSESSMENT
PAIN_FUNCTIONAL_ASSESSMENT: 0-10
PAIN_FUNCTIONAL_ASSESSMENT: ACTIVITIES ARE NOT PREVENTED

## 2025-04-12 ASSESSMENT — LIFESTYLE VARIABLES
HOW MANY STANDARD DRINKS CONTAINING ALCOHOL DO YOU HAVE ON A TYPICAL DAY: 1 OR 2
HOW OFTEN DO YOU HAVE A DRINK CONTAINING ALCOHOL: MONTHLY OR LESS

## 2025-04-12 ASSESSMENT — PAIN DESCRIPTION - LOCATION: LOCATION: GENERALIZED

## 2025-04-12 ASSESSMENT — PAIN DESCRIPTION - PAIN TYPE: TYPE: ACUTE PAIN

## 2025-04-12 NOTE — ED NOTES
Patient to ED for reoccurring pain generalized pain. Patient was admitted and discharged earlier today. Patient states she went home and pain and continues to have the pain.

## 2025-04-12 NOTE — H&P
Hospitalist  History and Physical    Patient:  Carl Fuchs  MRN: [id number]    CHIEF COMPLAINT: Generalized body aches    History Obtained From:  patient, electronic medical record  PCP: Crystal Jalloh APRN - CNP    HISTORY OF PRESENT ILLNESS:   Carl Fuchs is a 25-year-old female presented to [facility] 4/12/2025 with chief complaint of generalized body aches.  Patient states she has pain all over her body primarily left calf, both arms, back and shoulders.  Patient had endoscopy 4/11/25 which required intubation.   Patient is established with [facility].  Patient is currently being worked up for possible surgical pathway of weight loss with RYGB.     Recent [facility] ER evaluation on 3/18/25 with complaint of epigastric abdominal pain and non bloody diarrhea. Pain worsens with bending over. Patient has a history of morbid obesity, on Wegovy and cholecystectomy. Recent Ileus 1/2025. CT A/P no acute findings.  Multiple small mesenteric lymph nodes scattered in the abdomen and periaortic region most likely due to mesenteric adenitis.    Patient states complaint of left calf pain onset shortly after esophagogastroduodenoscopy was completed.  Through the course of the past 6 to 8 hours pain has progressively gotten worse involving shoulders back and both arms.  Patient has never had anything like this before.  Patient denies any fall or trauma.  She did reach out to GI with postprocedure complaints and was encouraged to seek ER evaluation.  Chest x-ray was completed.  While in the emergency room patient did receive Toradol, morphine, Zofran, 2L0.9 normal saline.     Past Medical History:        Diagnosis Date    Gallstones     Liver disease     Polycystic ovary disease    Former smoker      Past Surgical History:        Procedure Laterality Date    CHOLECYSTECTOMY, LAPAROSCOPIC N/A 1/30/2023    Laparoscopic Cholecystectomy performed by Harris Jacques MD at [facility] OR    COLONOSCOPY N/A 12/26/2023

## 2025-04-12 NOTE — DISCHARGE SUMMARY
Hospital Medicine Discharge Summary      Patient Identification:   Carl Fuchs   : 2000  MRN: [id number]   Account: [id number]      Patient's PCP: Crystal Jalloh, KAL - CNP    Admit Date: 2025     Discharge Date: 25    Admitting Physician: Dickson Price MD     Discharge Physician: Anders Fuchs PA-C     Carl Fuchs is a 25 y.o. female admitted to [facility] on 2025.      HPI On Admission From H&P:    \"Carl Fuchs is a 25-year-old female presented to [facility] 2025 with chief complaint of generalized body aches.  Patient states she has pain all over her body primarily left calf, both arms, back and shoulders.  Patient had endoscopy 25 which required intubation.   Patient is established with [facility].  Patient is currently being worked up for possible surgical pathway of weight loss with RYGB.      Recent [facility] ER evaluation on 3/18/25 with complaint of epigastric abdominal pain and non bloody diarrhea. Pain worsens with bending over. Patient has a history of morbid obesity, on Wegovy and cholecystectomy. Recent Ileus 2025. CT A/P no acute findings.  Multiple small mesenteric lymph nodes scattered in the abdomen and periaortic region most likely due to mesenteric adenitis.     Patient states complaint of left calf pain onset shortly after esophagogastroduodenoscopy was completed.  Through the course of the past 6 to 8 hours pain has progressively gotten worse involving shoulders back and both arms.  Patient has never had anything like this before.  Patient denies any fall or trauma.  She did reach out to GI with postprocedure complaints and was encouraged to seek ER evaluation.  Chest x-ray was completed.  While in the emergency room patient did receive Toradol, morphine, Zofran, 2L0.9 normal saline.\"    Assessment/Plan With Discharge Diagnoses:    \"Rhabdomyolysis, mild, Henderson score=4 (low risk for need of RRT) MZP=[id number]

## 2025-04-12 NOTE — PROGRESS NOTES
Pharmacist Review and Automatic Dose Adjustment of Prophylactic Enoxaparin or Heparin    Reviewed reason for admission/hospital problem list    The reviewing pharmacist has made an adjustment to the ordered enoxaparin or heparin dose or converted to heparin per the approved [facility] protocol and table as identified below.      Recent Labs     04/12/25  0315   CREATININE 0.8     Estimated Creatinine Clearance: 168 mL/min (based on SCr of 0.8 mg/dL).    Recent Labs     04/12/25  0315   HGB 12.8   HCT 39.9        No results for input(s): \"INR\" in the last 72 hours.    Height:   Ht Readings from Last 1 Encounters:   04/12/25 1.651 m (5' 5\")     Weight:  Wt Readings from Last 1 Encounters:   04/12/25 (!) 162.2 kg (357 lb 9.4 oz)       *Do not exceed enoxaparin 40mg daily or UFH 5000 units SUBQ TID in patients with epidurals,  lumbar drains, or external ventricular drains.    Plan:   Changed enoxaparin 40 mg subq daily to enoxaparin 40 mg subq BID.     Thank you,  Sukhjinder Vaughn, [facility]

## 2025-04-12 NOTE — ED NOTES
Patient resting in bed. Respirations easy and unlabored. No distress noted. Call light within reach.  Pt stating pain has not been relieved.

## 2025-04-12 NOTE — FLOWSHEET NOTE
Iv removed per rn, no complications.  Discharge instructions reviewed with pt, no questions.  Pt discharged to home.

## 2025-04-12 NOTE — PLAN OF CARE
Problem: Discharge Planning  Goal: Discharge to home or other facility with appropriate resources  Outcome: Adequate for Discharge  Flowsheets (Taken 4/12/2025 0932)  Discharge to home or other facility with appropriate resources: Identify barriers to discharge with patient and caregiver     Problem: Pain  Goal: Verbalizes/displays adequate comfort level or baseline comfort level  Outcome: Adequate for Discharge     Problem: ABCDS Injury Assessment  Goal: Absence of physical injury  Outcome: Adequate for Discharge     Problem: Anxiety  Goal: Will report anxiety at manageable levels  Description: INTERVENTIONS:1. Administer medication as ordered2. Teach and rehearse alternative coping skills3. Provide emotional support with 1:1 interaction with staff  Outcome: Adequate for Discharge  Flowsheets (Taken 4/12/2025 0932)  Will report anxiety at manageable levels:   Administer medication as ordered   Teach and rehearse alternative coping skills     Problem: Coping  Goal: Pt/Family able to verbalize concerns and demonstrate effective coping strategies  Description: INTERVENTIONS:1. Assist patient/family to identify coping skills, available support systems and cultural and spiritual values2. Provide emotional support, including active listening and acknowledgement of concerns of patient and caregivers3. Reduce environmental stimuli, as able4. Instruct patient/family in relaxation techniques, as appropriate5. Assess for spiritual pain/suffering and initiate Spiritual Care, Psychosocial Clinical Specialist consults as needed  Outcome: Adequate for Discharge  Flowsheets (Taken 4/12/2025 0932)  Patient/family able to verbalize anxieties, fears, and concerns, and demonstrate effective coping:   Assist patient/family to identify coping skills, available support systems and cultural and spiritual values   Provide emotional support, including active listening and acknowledgement of concerns of patient and caregivers     Problem:

## 2025-04-12 NOTE — ED PROVIDER NOTES
[facility] Emergency Department  [address] [address]  Phone: [phone]      CHIEF COMPLAINT       Chief Complaint   Patient presents with    Generalized Body Aches       Nurses Notes reviewed and I agree except as noted in the HPI.      HISTORY OF PRESENT ILLNESS    Carl Fuchs is a 25 y.o. female.    Patient states that she had a scheduled endoscopy yesterday.  She was intubated during the procedure.  She states that today she woke up with generalized bodyaches involving all of her extremities and has had some coughing.  Basically general aches and pains.  Patient drove herself to the hospital today.  No fever reported.  No sick contacts    REVIEW OF SYSTEMS              PAST MEDICAL HISTORY    has a past medical history of Gallstones, Liver disease, and Polycystic ovary disease.    SURGICAL HISTORY      has a past surgical history that includes Tonsillectomy and adenoidectomy; cyst removal (Left); Cholecystectomy, laparoscopic (N/A, 1/30/2023); Colonoscopy (N/A, 12/26/2023); and Upper gastrointestinal endoscopy (N/A, 2/9/2024).    CURRENT MEDICATIONS       Previous Medications    DICYCLOMINE (BENTYL) 10 MG CAPSULE    Take 1 capsule by mouth 4 times daily (before meals and nightly)    GAVILAX 17 GM/SCOOP POWDER    as needed    LOPERAMIDE (IMODIUM) 2 MG CAPSULE    Take by mouth as needed    OMEPRAZOLE (PRILOSEC) 20 MG DELAYED RELEASE CAPSULE    Take 1 capsule by mouth as needed    VITAMIN D (ERGOCALCIFEROL) 1.25 MG (78541 UT) CAPS CAPSULE    Take 1 capsule by mouth once a week    WEGOVY 1.7 MG/0.75ML SOAJ SC INJECTION    INJECT 1 PEN SUBCUTANEOUSLY ONCE WEEKLY       ALLERGIES     is allergic to acyclovir and valacyclovir.    FAMILY HISTORY     She indicated that her mother is alive. She indicated that her father is alive. She indicated that the status of her sister is unknown. She indicated that her brother is alive. She indicated that the status of her maternal grandmother is unknown.  components:       Result Value    Total  (*)     All other components within normal limits   CBC WITH AUTO DIFFERENTIAL - Abnormal; Notable for the following components:    WBC 14.8 (*)     MCHC 32.1 (*)     Neutrophils Absolute 10.3 (*)     All other components within normal limits   COVID-19 & INFLUENZA COMBO   LACTIC ACID   SEDIMENTATION RATE   C-REACTIVE PROTEIN   COMPREHENSIVE METABOLIC PANEL   ANION GAP   GLOMERULAR FILTRATION RATE, ESTIMATED   OSMOLALITY   MYOGLOBIN, URINE       EMERGENCY DEPARTMENT COURSE:   Vitals:    Vitals:    04/12/25 0234 04/12/25 0312 04/12/25 0342   BP: 136/77 136/89 123/80   Pulse: 68     Resp: 18     Temp: 98.4 °F (36.9 °C)     TempSrc: Oral     SpO2: 98% 100% 97%     Patient is complaining of generalized bodyaches muscle aches.  Does have a markedly elevated total CPK greater than 800.  I am wondering if this may have been a drug reaction from one of the anesthetics.  I do not see any other obvious reason for it but I think this needs to be watched to see if it is on the rise.  I have ordered to liters of saline.  Case discussed with the hospitalist to arrange for admission.      CRITICAL CARE:   none        FINAL IMPRESSION    Rhabdomyolysis    DISPOSITION/PLAN   Admission    DISCHARGE MEDICATIONS:  New Prescriptions    No medications on file       (Please note that portions of this note were completed with a voice recognition program.  Efforts were made to edit the dictations but occasionally words are mis-transcribed.)    DO Annie Woodruff Lawrence, DO  04/12/25 0532

## 2025-04-12 NOTE — ED NOTES
Pain 9/10 at this time. Pt states \"the fentanyl is not taking the pain away, but it is taking the edge off\". Patient resting in bed. Respirations easy and unlabored. No distress noted. Call light within reach.

## 2025-04-12 NOTE — ED TRIAGE NOTES
Pt to ED from home with c/o generalized body aches. Pt states she has pain all over body, primarily left calf, both arms, back, shoulders. Pt states she had endoscopy yesterday and was intubated. Pt voicing she was told to come back into ED if she was experiencing pain. EKG completed. Call light in reach. Even and unlabored respirations.

## 2025-04-12 NOTE — PROGRESS NOTES
Pt admitted to  7K18 from ED.     Complaints: Muscle Soreness.      IV normal saline infusing into the antecubital right, condition patent and no redness via gravity with about 1,000 mls in the bag still. IV site free of s/s of infection or infiltration. Vital signs obtained. Assessment and data collection initiated.     Two nurse skin assessment performed by Kylah ORR and Alda ORR. Oriented to room.     Explained patients right to have family, representative or physician notified of their admission.  Patient has Declined for physician to be notified.  Patient has Declined for family/representative to be notified.    The patient is interested in [facility] meds to beds program?:  No    Policies and procedures for  explained. All questions answered with no further questions at this time. Fall prevention and safety brochure discussed with patient.  Bed alarm on. Call light in reach.

## 2025-04-12 NOTE — DISCHARGE INSTRUCTIONS
You were admitted to the hospital for an elevated creatinine phosphokinase level.  This can be indicative of a process where your muscles breakdown called rhabdomyolysis.  Your levels have been less than typically seen with rhabdomyolysis, though no specific level exist in the literature.    Your levels have trended down since admission following IV fluid administration.  We recommend that you continue to stay well-hydrated at home and drink plenty of water.  You should avoid any alcoholic beverages.  Repeat labs have been ordered for Monday for you to get these performed in the outpatient setting.    Please contact your PCPs office on Monday to discuss moving up your follow-up appointment with your PCP from Wednesday to Monday or Tuesday if possible.  You are prescribed a short course of pain medication to help with your muscle soreness.  Please take this medication as prescribed.    You should avoid all NSAIDs such as aspirin, ibuprofen, Advil, etc., until cleared to resume by your primary care provider.  We also recommend that you withhold from taking your Wegovy medication until instructed to resume by your family doctor/PCP.    Thank you for choosing [facility], we wish you a speedy recovery and good health.

## 2025-04-13 LAB
EKG ATRIAL RATE: 85 BPM
EKG P AXIS: 65 DEGREES
EKG P-R INTERVAL: 142 MS
EKG Q-T INTERVAL: 346 MS
EKG QRS DURATION: 80 MS
EKG QTC CALCULATION (BAZETT): 411 MS
EKG R AXIS: 24 DEGREES
EKG T AXIS: 27 DEGREES
EKG VENTRICULAR RATE: 85 BPM

## 2025-04-13 PROCEDURE — 93010 ELECTROCARDIOGRAM REPORT: CPT | Performed by: NUCLEAR MEDICINE

## 2025-04-13 NOTE — ED PROVIDER NOTES
[facility]  EMERGENCY DEPARTMENT ENCOUNTER          Pt Name: Carl Fuchs  MRN: [id number]  Birthdate 2000  Date of evaluation: 4/12/2025  Treating Resident Physician: Jakob Mukherjee MD  Supervising Physician: Jorge Hsu DO    History obtained from the patient.     CHIEF COMPLAINT       Chief Complaint   Patient presents with    Pain       HISTORY OF PRESENT ILLNESS    Carl Fuchs is a 25 y.o. female discharge from the hospital today after an EGD performed yesterday who presents to the emergency department with a complaint of diffuse pain.  She reports the symptoms have been ongoing prior to hospitalization.  Today however, she is complaint of worsening pain in her left lower extremity, neck, and throat.  Patient was intubated for EGD yesterday, I was emergently extubated after she woke up providing to remove the ET tube.  She also reports her pain has been worsening since surgery, stating that she was told she probably had a negative reaction to anesthesia.  Patient unsure of medications that were given during procedure.  EGD was negative for PUD but there was some abnormal mass which was biopsied.  Patient is obese and is in the process of possible bariatric surgery.  She reports since discharge, she was unable to  her pain medications because she does not want to get hooked onto these medications.  She however has been taking Tylenol which is not helpful.  She rates her pain in severity of 8/10 at this time.  Doppler study was performed in the left lower extremity prior to discharge today which was negative for DVT.  Patient endorsing pain with swelling, but denies crepitus, difficulty breathing, or SOB.  Otherwise, review of systems unremarkable except for aforementioned.    Patient was sent home on Roxicodone 1 tablet by mouth every 6 hours as needed for pain for the next 3 days.      Triage notes and Nursing notes were reviewed by myself.  Any

## 2025-04-13 NOTE — DISCHARGE INSTRUCTIONS
You were seen in the ED for generalized body pain.  You were recently admitted and intubated for an EGD procedure.  Chest x-ray was normal.  You are discharged with instructions to follow-up outpatient with your primary care provider.  You are encouraged to  your medications and take as prescribed for pain management.  Return to the ED if there is worsening pain, despite taking medications as issued.

## 2025-04-14 ENCOUNTER — HOSPITAL ENCOUNTER (OUTPATIENT)
Age: 25
Discharge: HOME | End: 2025-04-14
Payer: COMMERCIAL

## 2025-04-14 LAB
ANION GAP SERPL CALC-SCNC: 8 MEQ/L (ref 8–16)
BUN SERPL-MCNC: 9 MG/DL (ref 8–23)
CALCIUM SERPL-MCNC: 9.3 MG/DL (ref 8.6–10)
CHLORIDE SERPL-SCNC: 105 MEQ/L (ref 98–111)
CK SERPL-CCNC: 165 U/L (ref 26–192)
CO2 SERPL-SCNC: 25 MEQ/L (ref 22–29)
CREAT SERPL-MCNC: 0.8 MG/DL (ref 0.5–0.9)
GFR SERPL CREATININE-BSD FRML MDRD: > 90 ML/MIN/1.73M2
GLUCOSE SERPL-MCNC: 87 MG/DL (ref 74–109)
POTASSIUM SERPL-SCNC: 4.1 MEQ/L (ref 3.5–5.2)
SODIUM SERPL-SCNC: 138 MEQ/L (ref 135–145)

## 2025-04-14 PROCEDURE — 36415 COLL VENOUS BLD VENIPUNCTURE: CPT

## 2025-04-14 PROCEDURE — 82550 ASSAY OF CK (CPK): CPT

## 2025-04-14 PROCEDURE — 80048 BASIC METABOLIC PNL TOTAL CA: CPT

## 2025-04-23 ENCOUNTER — HOSPITAL ENCOUNTER (OUTPATIENT)
Age: 25
Discharge: HOME | End: 2025-04-25
Attending: INTERNAL MEDICINE
Payer: COMMERCIAL

## 2025-04-23 VITALS
DIASTOLIC BLOOD PRESSURE: 78 MMHG | HEIGHT: 65 IN | WEIGHT: 293 LBS | BODY MASS INDEX: 48.82 KG/M2 | SYSTOLIC BLOOD PRESSURE: 129 MMHG

## 2025-04-23 DIAGNOSIS — R06.02 SHORTNESS OF BREATH: ICD-10-CM

## 2025-04-23 LAB
ECHO AO ASC DIAM: 2.4 CM
ECHO AO ASCENDING AORTA INDEX: 0.95 CM/M2
ECHO AV CUSP MM: 2.2 CM
ECHO AV PEAK GRADIENT: 7 MMHG
ECHO AV PEAK VELOCITY: 1.3 M/S
ECHO AV VELOCITY RATIO: 0.77
ECHO BSA: 2.72 M2
ECHO EST RA PRESSURE: 5 MMHG
ECHO LA AREA 2C: 12.7 CM2
ECHO LA AREA 4C: 13.3 CM2
ECHO LA DIAMETER INDEX: 1.19 CM/M2
ECHO LA DIAMETER: 3 CM
ECHO LA MAJOR AXIS: 4.3 CM
ECHO LA MINOR AXIS: 4.7 CM
ECHO LA VOL BP: 30 ML (ref 22–52)
ECHO LA VOL MOD A2C: 27 ML (ref 22–52)
ECHO LA VOL MOD A4C: 32 ML (ref 22–52)
ECHO LA VOL/BSA BIPLANE: 12 ML/M2 (ref 16–34)
ECHO LA VOLUME INDEX MOD A2C: 11 ML/M2 (ref 16–34)
ECHO LA VOLUME INDEX MOD A4C: 13 ML/M2 (ref 16–34)
ECHO LV EF PHYSICIAN: 50 %
ECHO LV FRACTIONAL SHORTENING: 25 % (ref 28–44)
ECHO LV INTERNAL DIMENSION DIASTOLE INDEX: 2.02 CM/M2
ECHO LV INTERNAL DIMENSION DIASTOLIC: 5.1 CM (ref 3.9–5.3)
ECHO LV INTERNAL DIMENSION SYSTOLIC INDEX: 1.5 CM/M2
ECHO LV INTERNAL DIMENSION SYSTOLIC: 3.8 CM
ECHO LV ISOVOLUMETRIC RELAXATION TIME (IVRT): 53 MS
ECHO LV IVSD: 1 CM (ref 0.6–0.9)
ECHO LV MASS 2D: 175.6 G (ref 67–162)
ECHO LV MASS INDEX 2D: 69.4 G/M2 (ref 43–95)
ECHO LV POSTERIOR WALL DIASTOLIC: 0.9 CM (ref 0.6–0.9)
ECHO LV RELATIVE WALL THICKNESS RATIO: 0.35
ECHO LVOT PEAK GRADIENT: 4 MMHG
ECHO LVOT PEAK VELOCITY: 1 M/S
ECHO MV A VELOCITY: 0.42 M/S
ECHO MV E DECELERATION TIME (DT): 183 MS
ECHO MV E VELOCITY: 0.89 M/S
ECHO MV E/A RATIO: 2.12
ECHO PV MAX VELOCITY: 0.9 M/S
ECHO PV PEAK GRADIENT: 3 MMHG
ECHO RIGHT VENTRICULAR SYSTOLIC PRESSURE (RVSP): 23 MMHG
ECHO RV INTERNAL DIMENSION: 2.9 CM
ECHO RV TAPSE: 2.6 CM (ref 1.7–?)
ECHO TV E WAVE: 0.7 M/S
ECHO TV REGURGITANT MAX VELOCITY: 2.15 M/S
ECHO TV REGURGITANT PEAK GRADIENT: 18 MMHG

## 2025-04-23 PROCEDURE — 93306 TTE W/DOPPLER COMPLETE: CPT

## 2025-04-29 ENCOUNTER — HOSPITAL ENCOUNTER (EMERGENCY)
Age: 25
Discharge: HOME | End: 2025-04-29
Payer: COMMERCIAL

## 2025-04-29 VITALS
DIASTOLIC BLOOD PRESSURE: 92 MMHG | TEMPERATURE: 98.4 F | HEART RATE: 79 BPM | OXYGEN SATURATION: 97 % | SYSTOLIC BLOOD PRESSURE: 142 MMHG | RESPIRATION RATE: 18 BRPM

## 2025-04-29 DIAGNOSIS — N30.00 ACUTE CYSTITIS WITHOUT HEMATURIA: ICD-10-CM

## 2025-04-29 DIAGNOSIS — M25.462 PAIN AND SWELLING OF LEFT KNEE: Primary | ICD-10-CM

## 2025-04-29 DIAGNOSIS — M25.562 PAIN AND SWELLING OF LEFT KNEE: Primary | ICD-10-CM

## 2025-04-29 LAB
BACTERIA URNS QL MICRO: ABNORMAL /HPF
BILIRUB UR QL STRIP.AUTO: NEGATIVE
CASTS #/AREA URNS LPF: ABNORMAL /LPF
CASTS 2: ABNORMAL /LPF
CHARACTER UR: ABNORMAL
COLOR, UA: YELLOW
CRYSTALS URNS MICRO: ABNORMAL
EPITHELIAL CELLS, UA: ABNORMAL /HPF
GLUCOSE UR QL STRIP.AUTO: NEGATIVE MG/DL
HGB UR QL STRIP.AUTO: ABNORMAL
KETONES UR QL STRIP.AUTO: ABNORMAL
MISCELLANEOUS 2: ABNORMAL
NITRITE UR QL STRIP: POSITIVE
PH UR STRIP.AUTO: 6.5 [PH] (ref 5–9)
PROT UR STRIP.AUTO-MCNC: 100 MG/DL
RBC URINE: ABNORMAL /HPF
RENAL EPI CELLS #/AREA URNS HPF: ABNORMAL /[HPF]
SP GR UR REFRACT.AUTO: 1.03 (ref 1–1.03)
UROBILINOGEN, URINE: 1 EU/DL (ref 0–1)
WBC #/AREA URNS HPF: > 200 /HPF
WBC #/AREA URNS HPF: ABNORMAL /[HPF]
YEAST LIKE FUNGI URNS QL MICRO: ABNORMAL

## 2025-04-29 PROCEDURE — 81001 URINALYSIS AUTO W/SCOPE: CPT

## 2025-04-29 PROCEDURE — 87086 URINE CULTURE/COLONY COUNT: CPT

## 2025-04-29 PROCEDURE — 6370000000 HC RX 637 (ALT 250 FOR IP): Performed by: PHYSICIAN ASSISTANT

## 2025-04-29 PROCEDURE — 99283 EMERGENCY DEPT VISIT LOW MDM: CPT

## 2025-04-29 PROCEDURE — 87077 CULTURE AEROBIC IDENTIFY: CPT

## 2025-04-29 PROCEDURE — 87186 SC STD MICRODIL/AGAR DIL: CPT

## 2025-04-29 RX ORDER — IBUPROFEN 800 MG/1
800 TABLET, FILM COATED ORAL EVERY 8 HOURS PRN
Qty: 15 TABLET | Refills: 0 | Status: SHIPPED | OUTPATIENT
Start: 2025-04-29

## 2025-04-29 RX ORDER — NITROFURANTOIN 25; 75 MG/1; MG/1
100 CAPSULE ORAL
Status: COMPLETED | OUTPATIENT
Start: 2025-04-29 | End: 2025-04-29

## 2025-04-29 RX ORDER — NITROFURANTOIN 25; 75 MG/1; MG/1
100 CAPSULE ORAL 2 TIMES DAILY
Qty: 14 CAPSULE | Refills: 0 | Status: SHIPPED | OUTPATIENT
Start: 2025-04-29 | End: 2025-05-06

## 2025-04-29 RX ORDER — PHENAZOPYRIDINE HYDROCHLORIDE 200 MG/1
200 TABLET, FILM COATED ORAL 3 TIMES DAILY PRN
Qty: 6 TABLET | Refills: 0 | Status: SHIPPED | OUTPATIENT
Start: 2025-04-29 | End: 2025-05-02

## 2025-04-29 RX ORDER — PHENAZOPYRIDINE HYDROCHLORIDE 100 MG/1
200 TABLET, FILM COATED ORAL ONCE
Status: COMPLETED | OUTPATIENT
Start: 2025-04-29 | End: 2025-04-29

## 2025-04-29 RX ADMIN — PHENAZOPYRIDINE 200 MG: 100 TABLET ORAL at 20:17

## 2025-04-29 RX ADMIN — NITROFURANTOIN (MONOHYDRATE/MACROCRYSTALS) 100 MG: 25; 75 CAPSULE ORAL at 20:17

## 2025-04-29 ASSESSMENT — PAIN DESCRIPTION - FREQUENCY: FREQUENCY: CONTINUOUS

## 2025-04-29 ASSESSMENT — PAIN DESCRIPTION - ORIENTATION: ORIENTATION: LEFT

## 2025-04-29 ASSESSMENT — PAIN DESCRIPTION - PAIN TYPE: TYPE: ACUTE PAIN

## 2025-04-29 ASSESSMENT — PAIN DESCRIPTION - ONSET: ONSET: ON-GOING

## 2025-04-29 ASSESSMENT — PAIN DESCRIPTION - LOCATION: LOCATION: KNEE

## 2025-04-29 ASSESSMENT — PAIN - FUNCTIONAL ASSESSMENT: PAIN_FUNCTIONAL_ASSESSMENT: 0-10

## 2025-04-29 ASSESSMENT — PAIN SCALES - GENERAL: PAINLEVEL_OUTOF10: 9

## 2025-04-29 ASSESSMENT — PAIN DESCRIPTION - DESCRIPTORS: DESCRIPTORS: STABBING

## 2025-04-29 NOTE — ED PROVIDER NOTES
[facility] EMERGENCY DEPT      Pt Name: Carl Fuchs  MRN: [id number]  Birthdate 2000  Date of evaluation: 4/29/2025  Provider: Yumi Perera PA-C    CHIEF COMPLAINT       Chief Complaint   Patient presents with    Dysuria    Knee Pain     L       Nurses Notes reviewed and I agree except as noted in the HPI.      HISTORY OF PRESENT ILLNESS    Carl uFchs is a 25 y.o. female who presents to the [facility] with significant other for 2 complaints.  Patient reports concern for \"fluid on the knee\" for the past 2 days secondary to the knee feeling tight.  There is some discomfort in the back of the knee when she walks.  Patient explained that she always \"cracks her knees\" and sometime ago she cracked the left knee \"too far.\"  Since then she has had problems from time to time with that knee.  She denies any twist or fall trauma or injury.  Patient denies numbness or weakness.    Second complaint is dysuria onset yesterday.  There is also frequency and small amounts.  Patient denies hematuria, urgency, fever, chills, abdominal pain, back pain, nausea, vomiting, change in appetite, or other complaints.  Last menstrual period is current patient denies possibility of pregnancy.    Vascular duplex lower extremity venous left 4-  LEFT SIDE:   The common femoral vein demonstrates normal flow, augmentation and  compressibility. The saphenofemoral junction appears patent and compressible.  The greater saphenous vein appears patent. The femoral and popliteal veins  demonstrate normal flow, compressibility and augmentation. The deep veins of the  calf appear patent including the gastrocnemius, posterior tibial, peroneal and  anterior tibial veins.     IMPRESSION:  1. No sonographic evidence of left lower extremity DVT.     MRI left knee without 11-  FINDINGS:   ALIGNMENT: Anatomic.     MARROW SIGNAL/MARROW EDEMA/FRACTURE: No acute fracture. . Bone marrow signal is  felt to be unremarkable.     MENISCI: Intact

## 2025-04-29 NOTE — ED NOTES
Pt to ER with c/o dysuria and L knee pain. Pt states that her knee feels like it has fluid on her knee and it burns when she urinates.

## 2025-05-02 LAB
BACTERIA UR CULT: ABNORMAL
ORGANISM: ABNORMAL

## 2025-05-06 NOTE — PROGRESS NOTES
Pharmacy Note  ED Culture Follow-up    Carl Fuchs is a 25 y.o. female.     Allergies: Acyclovir and Valacyclovir     Current antimicrobials:   Reviewed patient's urine culture - culture positive for E. coli.  Patient was discharged on nitrofurantoin, and culture is sensitive to prescribed medication.  Antibiotic prescribed at discharge is appropriate - no changes made to antibiotic regimen.      Please call with any questions. Ext. [phone]    Olga Glover RPH, PharmD 3:18 PM 5/6/2025

## 2025-05-21 NOTE — PROGRESS NOTES
Assessment: Patient is a 25 y.o. female seen for month five  follow up MNT visit for pre op bariatric surgery desires gastric bypass   Last seen in March by RD  Pt had brother in office today    Vitals from current and previous visits:      5/23/2025  10:29 AM   Vitals    SYSTOLIC 112    DIASTOLIC 70    BP Site Right Upper Arm    Patient Position Sitting    BP Cuff Size Large Adult    Pulse 78    Temp 97.7 °F (36.5 °C)    Respirations    SpO2    Weight - Scale 353 lb 6.4 oz (H)    Height 5' 5\"    Body Mass Index 58.81 kg/m2 (H)    Pain Level          Initial weight at start of Weight Management Program was: 379 lbs     Carl up 5 lbs from last office visit  -Weight goal: lose weight.     -Nutritionally relevant labs: Initial lab work ordered  and reviewed  Lab Results   Component Value Date/Time    LABA1C 5.3 03/06/2025 10:53 AM    GLUCOSE 87 04/14/2025 09:51 AM    GLUCOSE 95 04/12/2025 07:29 AM    CHOL 163 03/06/2025 10:53 AM    HDL 41 03/06/2025 10:53 AM    TRIG 55 03/06/2025 10:53 AM                  Lab Results   Component Value Date/Time    VITD25 13 03/06/2025 10:53 AM   EGD was done 4/11/25 and (+) H Pylori and (+)Gastritis.  States has to finish UTI medicine (two more days) and wait two days and will start medication for H Pylori.  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 Lives with Fiance    - Is patient taking daily Multivitamin: Equate Complete MVI one a day . Today states completed four weeks of Vitamin D and doesn't have anymore.  Pt didn't pick any refills up.  Will have PA re-order another 12 week Vitamin D3 50,000IU prescription to take once a week    Wegovy on Fridays - started 2.4 mg today  \"my stomach is always upset\"- was on 1.7 mg but states hasn't had medication in one month d/t post op

## 2025-05-23 ENCOUNTER — OFFICE VISIT (OUTPATIENT)
Dept: BARIATRICS/WEIGHT MGMT | Age: 25
End: 2025-05-23

## 2025-05-23 VITALS
HEART RATE: 78 BPM | BODY MASS INDEX: 48.82 KG/M2 | DIASTOLIC BLOOD PRESSURE: 70 MMHG | HEIGHT: 65 IN | SYSTOLIC BLOOD PRESSURE: 112 MMHG | WEIGHT: 293 LBS | TEMPERATURE: 97.7 F

## 2025-05-23 DIAGNOSIS — K21.9 GASTROESOPHAGEAL REFLUX DISEASE, UNSPECIFIED WHETHER ESOPHAGITIS PRESENT: ICD-10-CM

## 2025-05-23 DIAGNOSIS — K76.0 FATTY LIVER: ICD-10-CM

## 2025-05-23 DIAGNOSIS — Z01.818 PRE-OP TESTING: Primary | ICD-10-CM

## 2025-05-23 DIAGNOSIS — E55.9 VITAMIN D DEFICIENCY: ICD-10-CM

## 2025-05-23 RX ORDER — METRONIDAZOLE 500 MG/1
TABLET ORAL
COMMUNITY
Start: 2025-05-16

## 2025-05-23 RX ORDER — BISMUTH SUBSALICYLATE 262 MG/1
TABLET, CHEWABLE ORAL
COMMUNITY
Start: 2025-05-16

## 2025-05-23 RX ORDER — NYSTATIN AND TRIAMCINOLONE ACETONIDE 100000; 1 [USP'U]/G; MG/G
OINTMENT TOPICAL
COMMUNITY
Start: 2025-04-16

## 2025-05-23 RX ORDER — CEPHALEXIN 500 MG/1
500 CAPSULE ORAL 4 TIMES DAILY
COMMUNITY
Start: 2025-05-07 | End: 2025-05-12

## 2025-05-23 RX ORDER — NITROFURANTOIN 25; 75 MG/1; MG/1
CAPSULE ORAL
COMMUNITY
Start: 2025-05-12

## 2025-05-23 RX ORDER — TETRACYCLINE HYDROCHLORIDE 500 MG/1
CAPSULE ORAL
COMMUNITY
Start: 2025-05-19

## 2025-05-23 RX ORDER — ESOMEPRAZOLE MAGNESIUM 40 MG/1
CAPSULE, DELAYED RELEASE ORAL
COMMUNITY
Start: 2025-05-16

## 2025-05-23 NOTE — PATIENT INSTRUCTIONS
Goals:  Nutrition Goal:  I will bring back my food journal to record meal times, serving sizes and bring back to next dietitian visit  Suggested eating times: 8:30am-9am-  11:30am- lunch  6pm- dinner meal  9:00p- small snack.  Suggest one 30 gram protein shake per day  Water Goal:  Continue at least 64 oz of water or greater each day-   Exercise Goal:  Use treadmill for 30 minutes most days of the week (4-5 days a week)  5.   Slow down when eating - take 20 minutes to eat as the medication is slowing down your gastro motility.   Avoid fried and greasy foods.  Focus on lean protein foods (see list given to you) and do not go long periods between meals.

## 2025-05-27 ENCOUNTER — LAB (OUTPATIENT)
Dept: LAB | Age: 25
End: 2025-05-27

## 2025-05-27 DIAGNOSIS — E55.9 VITAMIN D DEFICIENCY: Primary | ICD-10-CM

## 2025-05-27 DIAGNOSIS — Z01.818 PRE-OP TESTING: ICD-10-CM

## 2025-05-27 LAB
AMPHETAMINES UR QL SCN: NEGATIVE
BARBITURATES UR QL SCN: NEGATIVE
BENZODIAZ UR QL SCN: NEGATIVE
BUPRENORPHINE URINE: NEGATIVE
BZE UR QL SCN: NEGATIVE
CANNABINOIDS UR QL SCN: NEGATIVE
FENTANYL: NEGATIVE
OPIATES UR QL SCN: NEGATIVE
OXYCODONE: NEGATIVE
PCP UR QL SCN: NEGATIVE

## 2025-05-27 RX ORDER — ERGOCALCIFEROL 1.25 MG/1
50000 CAPSULE, LIQUID FILLED ORAL WEEKLY
Qty: 12 CAPSULE | Refills: 0 | Status: SHIPPED | OUTPATIENT
Start: 2025-05-27

## 2025-05-29 ENCOUNTER — HOSPITAL ENCOUNTER (EMERGENCY)
Age: 25
Discharge: HOME | End: 2025-05-29
Payer: COMMERCIAL

## 2025-05-29 VITALS
HEART RATE: 88 BPM | SYSTOLIC BLOOD PRESSURE: 136 MMHG | DIASTOLIC BLOOD PRESSURE: 88 MMHG | HEIGHT: 65 IN | WEIGHT: 293 LBS | RESPIRATION RATE: 16 BRPM | TEMPERATURE: 98.1 F | OXYGEN SATURATION: 97 % | BODY MASS INDEX: 48.82 KG/M2

## 2025-05-29 DIAGNOSIS — R51.9 SINUS HEADACHE: Primary | ICD-10-CM

## 2025-05-29 PROCEDURE — 96372 THER/PROPH/DIAG INJ SC/IM: CPT

## 2025-05-29 PROCEDURE — 99213 OFFICE O/P EST LOW 20 MIN: CPT

## 2025-05-29 PROCEDURE — 6360000002 HC RX W HCPCS: Performed by: NURSE PRACTITIONER

## 2025-05-29 PROCEDURE — 99213 OFFICE O/P EST LOW 20 MIN: CPT | Performed by: NURSE PRACTITIONER

## 2025-05-29 RX ORDER — KETOROLAC TROMETHAMINE 30 MG/ML
60 INJECTION, SOLUTION INTRAMUSCULAR; INTRAVENOUS ONCE
Status: COMPLETED | OUTPATIENT
Start: 2025-05-29 | End: 2025-05-29

## 2025-05-29 RX ORDER — PSEUDOEPHEDRINE HCL 120 MG/1
120 TABLET, FILM COATED, EXTENDED RELEASE ORAL EVERY 12 HOURS
Qty: 10 TABLET | Refills: 0 | Status: SHIPPED | OUTPATIENT
Start: 2025-05-29 | End: 2025-06-03

## 2025-05-29 RX ADMIN — KETOROLAC TROMETHAMINE 60 MG: 60 INJECTION, SOLUTION INTRAMUSCULAR at 16:54

## 2025-05-29 ASSESSMENT — ENCOUNTER SYMPTOMS
COUGH: 0
ABDOMINAL PAIN: 0
STRIDOR: 0
CHOKING: 0
SWOLLEN GLANDS: 0
SORE THROAT: 0
VOMITING: 0
APNEA: 0
WHEEZING: 0
NAUSEA: 0
BACK PAIN: 0
PHOTOPHOBIA: 0
VISUAL CHANGE: 0
EYE PAIN: 0
BLURRED VISION: 0
SHORTNESS OF BREATH: 0
SINUS PRESSURE: 1
TINGLING: 0
DIARRHEA: 0
CHEST TIGHTNESS: 0

## 2025-05-29 ASSESSMENT — PAIN - FUNCTIONAL ASSESSMENT
PAIN_FUNCTIONAL_ASSESSMENT: 0-10
PAIN_FUNCTIONAL_ASSESSMENT: ACTIVITIES ARE NOT PREVENTED
PAIN_FUNCTIONAL_ASSESSMENT: NONE - DENIES PAIN

## 2025-05-29 ASSESSMENT — PAIN DESCRIPTION - DESCRIPTORS: DESCRIPTORS: ACHING

## 2025-05-29 ASSESSMENT — PAIN DESCRIPTION - LOCATION
LOCATION: HEAD
LOCATION: HEAD

## 2025-05-29 ASSESSMENT — PAIN SCALES - GENERAL: PAINLEVEL_OUTOF10: 8

## 2025-05-29 ASSESSMENT — LIFESTYLE VARIABLES
HOW OFTEN DO YOU HAVE A DRINK CONTAINING ALCOHOL: NEVER
HOW MANY STANDARD DRINKS CONTAINING ALCOHOL DO YOU HAVE ON A TYPICAL DAY: PATIENT DOES NOT DRINK

## 2025-05-29 ASSESSMENT — PAIN DESCRIPTION - PAIN TYPE: TYPE: ACUTE PAIN

## 2025-05-29 ASSESSMENT — PAIN DESCRIPTION - ONSET: ONSET: GRADUAL

## 2025-05-29 ASSESSMENT — PAIN DESCRIPTION - FREQUENCY: FREQUENCY: CONTINUOUS

## 2025-05-29 NOTE — ED PROVIDER NOTES
[facility] URGENT CARE  Urgent Care Encounter      CHIEF COMPLAINT       Chief Complaint   Patient presents with    Nausea    Vomiting    Headache    Letter for School/Work       Nurses Notes reviewed and I agree except as noted in the HPI.  HISTORY OFPRESENT ILLNESS   Carl Fuchs is a 25 y.o.  The history is provided by the patient. No  was used.   Headache  Pain location:  Frontal  Quality:  Dull  Radiates to:  Face  Severity currently:  8/10  Severity at highest:  Unable to specify  Onset quality:  Gradual  Duration:  4 days  Timing:  Intermittent  Progression:  Worsening  Chronicity:  New  Similar to prior headaches: yes    Context: activity and bright light    Context: not caffeine, not coughing, not defecating, not eating, not stress, not exposure to cold air, not intercourse, not loud noise and not straining    Relieved by:  Nothing  Worsened by:  Activity, light, neck movement and sound  Ineffective treatments:  Resting in a darkened room and acetaminophen  Associated symptoms: congestion, fatigue and sinus pressure    Associated symptoms: no abdominal pain, no back pain, no blurred vision, no cough, no diarrhea, no dizziness, no drainage, no ear pain, no eye pain, no facial pain, no fever, no focal weakness, no hearing loss, no loss of balance, no myalgias, no nausea, no near-syncope, no neck pain, no neck stiffness, no numbness, no paresthesias, no photophobia, no seizures, no sore throat, no swollen glands, no syncope, no tingling, no URI, no visual change, no vomiting and no weakness    Risk factors: no anger, no family hx of SAH, does not have insomnia and lifestyle not sedentary        REVIEW OF SYSTEMS     Review of Systems   Constitutional:  Positive for fatigue. Negative for activity change, appetite change, chills, diaphoresis and fever.   HENT:  Positive for congestion and sinus pressure. Negative for ear pain, hearing loss, postnasal drip and sore throat.    Eyes:

## 2025-05-29 NOTE — ED TRIAGE NOTES
Pt to [facility] ambulatory with nausea, vomiting, headache, and needing a work note.  This started on Monday.

## 2025-06-16 ENCOUNTER — HOSPITAL ENCOUNTER (EMERGENCY)
Age: 25
Discharge: HOME | End: 2025-06-16
Payer: COMMERCIAL

## 2025-06-16 VITALS
SYSTOLIC BLOOD PRESSURE: 122 MMHG | WEIGHT: 293 LBS | BODY MASS INDEX: 48.82 KG/M2 | OXYGEN SATURATION: 96 % | DIASTOLIC BLOOD PRESSURE: 83 MMHG | TEMPERATURE: 98.4 F | RESPIRATION RATE: 18 BRPM | HEART RATE: 95 BPM | HEIGHT: 65 IN

## 2025-06-16 DIAGNOSIS — R52 PAIN OF RIGHT SIDE OF BODY: ICD-10-CM

## 2025-06-16 DIAGNOSIS — M54.6 ACUTE BILATERAL THORACIC BACK PAIN: Primary | ICD-10-CM

## 2025-06-16 LAB
ALBUMIN SERPL BCG-MCNC: 3.9 G/DL (ref 3.4–4.9)
ALP SERPL-CCNC: 49 U/L (ref 38–126)
ALT SERPL W/O P-5'-P-CCNC: 9 U/L (ref 10–35)
ANION GAP SERPL CALC-SCNC: 12 MEQ/L (ref 8–16)
AST SERPL-CCNC: 19 U/L (ref 10–35)
B-HCG SERPL QL: NEGATIVE
BACTERIA URNS QL MICRO: ABNORMAL /HPF
BASOPHILS ABSOLUTE: 0 THOU/MM3 (ref 0–0.1)
BASOPHILS NFR BLD AUTO: 0.3 %
BILIRUB SERPL-MCNC: < 0.2 MG/DL (ref 0.3–1.2)
BILIRUB UR QL STRIP.AUTO: NEGATIVE
BUN SERPL-MCNC: 12 MG/DL (ref 8–23)
CALCIUM SERPL-MCNC: 9.1 MG/DL (ref 8.6–10)
CASTS #/AREA URNS LPF: ABNORMAL /LPF
CASTS 2: ABNORMAL /LPF
CHARACTER UR: ABNORMAL
CHLORIDE SERPL-SCNC: 106 MEQ/L (ref 98–111)
CK SERPL-CCNC: 139 U/L (ref 26–192)
CO2 SERPL-SCNC: 23 MEQ/L (ref 22–29)
COLOR, UA: YELLOW
CREAT SERPL-MCNC: 0.8 MG/DL (ref 0.5–0.9)
CRYSTALS URNS MICRO: ABNORMAL
DEPRECATED RDW RBC AUTO: 39.9 FL (ref 35–45)
EKG ATRIAL RATE: 96 BPM
EKG P AXIS: 63 DEGREES
EKG P-R INTERVAL: 150 MS
EKG Q-T INTERVAL: 338 MS
EKG QRS DURATION: 80 MS
EKG QTC CALCULATION (BAZETT): 427 MS
EKG R AXIS: 38 DEGREES
EKG T AXIS: 12 DEGREES
EKG VENTRICULAR RATE: 96 BPM
EOSINOPHIL NFR BLD AUTO: 1.1 %
EOSINOPHILS ABSOLUTE: 0.1 THOU/MM3 (ref 0–0.4)
EPITHELIAL CELLS, UA: ABNORMAL /HPF
ERYTHROCYTE [DISTWIDTH] IN BLOOD BY AUTOMATED COUNT: 12 % (ref 11.5–14.5)
GFR SERPL CREATININE-BSD FRML MDRD: > 90 ML/MIN/1.73M2
GLUCOSE SERPL-MCNC: 113 MG/DL (ref 74–109)
GLUCOSE UR QL STRIP.AUTO: NEGATIVE MG/DL
HCT VFR BLD AUTO: 39.6 % (ref 37–47)
HGB BLD-MCNC: 12.6 GM/DL (ref 12–16)
HGB UR QL STRIP.AUTO: NEGATIVE
IMM GRANULOCYTES # BLD AUTO: 0.02 THOU/MM3 (ref 0–0.07)
IMM GRANULOCYTES NFR BLD AUTO: 0.2 %
KETONES UR QL STRIP.AUTO: NEGATIVE
LYMPHOCYTES ABSOLUTE: 2.6 THOU/MM3 (ref 1–4.8)
LYMPHOCYTES NFR BLD AUTO: 23.6 %
MCH RBC QN AUTO: 29 PG (ref 26–33)
MCHC RBC AUTO-ENTMCNC: 31.8 GM/DL (ref 32.2–35.5)
MCV RBC AUTO: 91 FL (ref 81–99)
MISCELLANEOUS 2: ABNORMAL
MONOCYTES ABSOLUTE: 0.9 THOU/MM3 (ref 0.4–1.3)
MONOCYTES NFR BLD AUTO: 8 %
NEUTROPHILS ABSOLUTE: 7.5 THOU/MM3 (ref 1.8–7.7)
NEUTROPHILS NFR BLD AUTO: 66.8 %
NITRITE UR QL STRIP: NEGATIVE
NRBC BLD AUTO-RTO: 0 /100 WBC
OSMOLALITY SERPL CALC.SUM OF ELEC: 281.8 MOSMOL/KG (ref 275–300)
PH UR STRIP.AUTO: 8.5 [PH] (ref 5–9)
PLATELET # BLD AUTO: 305 THOU/MM3 (ref 130–400)
PMV BLD AUTO: 10 FL (ref 9.4–12.4)
POTASSIUM SERPL-SCNC: 3.7 MEQ/L (ref 3.5–5.2)
PROT SERPL-MCNC: 7.1 G/DL (ref 6.4–8.3)
PROT UR STRIP.AUTO-MCNC: NEGATIVE MG/DL
RBC # BLD AUTO: 4.35 MILL/MM3 (ref 4.2–5.4)
RBC URINE: ABNORMAL /HPF
RENAL EPI CELLS #/AREA URNS HPF: ABNORMAL /[HPF]
SODIUM SERPL-SCNC: 141 MEQ/L (ref 135–145)
SP GR UR REFRACT.AUTO: 1.02 (ref 1–1.03)
UROBILINOGEN, URINE: 1 EU/DL (ref 0–1)
WBC # BLD AUTO: 11.2 THOU/MM3 (ref 4.8–10.8)
WBC #/AREA URNS HPF: ABNORMAL /HPF
WBC #/AREA URNS HPF: NEGATIVE /[HPF]
YEAST LIKE FUNGI URNS QL MICRO: ABNORMAL

## 2025-06-16 PROCEDURE — 80053 COMPREHEN METABOLIC PANEL: CPT

## 2025-06-16 PROCEDURE — 99284 EMERGENCY DEPT VISIT MOD MDM: CPT

## 2025-06-16 PROCEDURE — 6370000000 HC RX 637 (ALT 250 FOR IP): Performed by: PHYSICIAN ASSISTANT

## 2025-06-16 PROCEDURE — 93010 ELECTROCARDIOGRAM REPORT: CPT | Performed by: INTERNAL MEDICINE

## 2025-06-16 PROCEDURE — 81001 URINALYSIS AUTO W/SCOPE: CPT

## 2025-06-16 PROCEDURE — 82550 ASSAY OF CK (CPK): CPT

## 2025-06-16 PROCEDURE — 85025 COMPLETE CBC W/AUTO DIFF WBC: CPT

## 2025-06-16 PROCEDURE — 93005 ELECTROCARDIOGRAM TRACING: CPT | Performed by: PHYSICIAN ASSISTANT

## 2025-06-16 PROCEDURE — 36415 COLL VENOUS BLD VENIPUNCTURE: CPT

## 2025-06-16 PROCEDURE — 84703 CHORIONIC GONADOTROPIN ASSAY: CPT

## 2025-06-16 RX ORDER — IBUPROFEN 800 MG/1
800 TABLET, FILM COATED ORAL EVERY 8 HOURS PRN
Qty: 15 TABLET | Refills: 0 | Status: SHIPPED | OUTPATIENT
Start: 2025-06-16

## 2025-06-16 RX ORDER — IBUPROFEN 800 MG/1
800 TABLET, FILM COATED ORAL ONCE
Status: COMPLETED | OUTPATIENT
Start: 2025-06-16 | End: 2025-06-16

## 2025-06-16 RX ORDER — CYCLOBENZAPRINE HCL 10 MG
10 TABLET ORAL 3 TIMES DAILY PRN
Qty: 12 TABLET | Refills: 0 | Status: SHIPPED | OUTPATIENT
Start: 2025-06-16

## 2025-06-16 RX ADMIN — IBUPROFEN 800 MG: 800 TABLET, FILM COATED ORAL at 00:46

## 2025-06-16 ASSESSMENT — ENCOUNTER SYMPTOMS
NAUSEA: 0
DIARRHEA: 0
COUGH: 0
CONSTIPATION: 0
VOMITING: 0
ABDOMINAL PAIN: 0
SHORTNESS OF BREATH: 0
BACK PAIN: 1

## 2025-06-16 ASSESSMENT — PAIN DESCRIPTION - DESCRIPTORS: DESCRIPTORS: OTHER (COMMENT)

## 2025-06-16 ASSESSMENT — PAIN SCALES - GENERAL
PAINLEVEL_OUTOF10: 6
PAINLEVEL_OUTOF10: 8

## 2025-06-16 ASSESSMENT — PAIN DESCRIPTION - ORIENTATION: ORIENTATION: RIGHT

## 2025-06-16 ASSESSMENT — PAIN DESCRIPTION - FREQUENCY: FREQUENCY: CONTINUOUS

## 2025-06-16 ASSESSMENT — PAIN DESCRIPTION - ONSET: ONSET: AWAKENED FROM SLEEP

## 2025-06-16 ASSESSMENT — PAIN DESCRIPTION - PAIN TYPE: TYPE: ACUTE PAIN

## 2025-06-16 ASSESSMENT — PAIN - FUNCTIONAL ASSESSMENT: PAIN_FUNCTIONAL_ASSESSMENT: 0-10

## 2025-06-16 NOTE — ED NOTES
Pt presents to ED via walk in with c/c of lower back pain, chest pain, and shoulder pain. Pt states that this started around 0700 6/15, and it got worse this evening. Pt states the pain started in her R pelvis and moved to her lower back, chest, and R shoulder. Pt states it hurts to breathe. Pt denies any known injury and denies taking anything for pain PTA. EKG performed at this time. Pt resting in ED chair. Respirations easy, unlabored. No distress noted. Call light within reach. Denies further needs.

## 2025-06-16 NOTE — ED PROVIDER NOTES
[facility] EMERGENCY DEPT      Pt Name: Carl Fuchs  MRN: [id number]  Birthdate 2000  Date of evaluation: 6/16/2025  Provider: Yumi Perera PA-C    CHIEF COMPLAINT       Chief Complaint   Patient presents with    Lower Back Pain       Nurses Notes reviewed and I agree except as noted in the HPI.      HISTORY OF PRESENT ILLNESS    Carl Fuchs is a 25 y.o. female who presents through the lobby with sister for pain.  Patient reported developing right sided pelvic pain at 0700 that eventually radiated up the right side of her body to her shoulder.  Later it moved towards the back and was no longer in her abdomen or pelvis.  \"It chilled out\" around 1400.  Patient took a nap and then at 1930 the pain came back however this time it was all in her back.  It started in the right mid back and radiated across to the left.  It woke her from sleep.  She explains that it feels like a pulled muscle but it is not a pulled muscle.  Patient also describes it as being hit by a car.  She is concerned that her \"CK could be elevated.\"  The pain is worse with deep breath and lying flat and improves on its own.  Currently her pain is much better and only occurs with certain movements.  Patient was unable to sleep at home prompting her to come to the ER.  Patient has taken no medications for pain.  The patient denies abnormal vaginal discharge, vaginal bleeding, UTI symptoms, fever, chills, nausea, vomiting, diarrhea, constipation, chest pain, dyspnea, or other complaints.  Patient denies possibility of pregnancy.    Location/Symptom: Mid back pain  Timing/Onset: 1930  Context/Setting: The pain woke her from sleep.  Patient earlier had right sided pelvic pain to her shoulder that then radiated to the back.  However that resolved around 1400.  Patient denies any fall, trauma, or injury.  Quality: Like a pulled muscle or like being hit by a car  Duration: Intermittent  Modifying Factors: Worsened with certain movements, deep

## 2025-07-29 ENCOUNTER — HOSPITAL ENCOUNTER (EMERGENCY)
Age: 25
Discharge: HOME | End: 2025-07-29
Payer: COMMERCIAL

## 2025-07-29 VITALS
BODY MASS INDEX: 48.82 KG/M2 | OXYGEN SATURATION: 97 % | WEIGHT: 293 LBS | RESPIRATION RATE: 16 BRPM | TEMPERATURE: 98.1 F | HEIGHT: 65 IN | SYSTOLIC BLOOD PRESSURE: 131 MMHG | DIASTOLIC BLOOD PRESSURE: 99 MMHG | HEART RATE: 80 BPM

## 2025-07-29 DIAGNOSIS — N94.6 MENSTRUAL CRAMP: Primary | ICD-10-CM

## 2025-07-29 PROCEDURE — 99212 OFFICE O/P EST SF 10 MIN: CPT | Performed by: NURSE PRACTITIONER

## 2025-07-29 PROCEDURE — 99213 OFFICE O/P EST LOW 20 MIN: CPT

## 2025-07-29 ASSESSMENT — PAIN - FUNCTIONAL ASSESSMENT: PAIN_FUNCTIONAL_ASSESSMENT: NONE - DENIES PAIN

## 2025-07-29 ASSESSMENT — ENCOUNTER SYMPTOMS: ABDOMINAL PAIN: 1

## 2025-07-29 NOTE — ED PROVIDER NOTES
[facility] URGENT CARE  Urgent Care Encounter       CHIEF COMPLAINT       Chief Complaint   Patient presents with    Abdominal Cramping    Letter for School/Work       Nurses Notes reviewed and I agree except as noted in the HPI.  HISTORY OF PRESENT ILLNESS   Carl Fuchs is a 25 y.o. female who presents to urgent care with c/o abd cramping that started yesterday.  Pt states she was having menstrual cramping yesterday while at work.  Pt states she was also having heavy bleeding, but this is normal with her menstrual cycles due to being on Eliquis.  Pt states she has mild cramping today.  Pt states she is scheduled with GYN next week for further evaluation of menstrual cycles.     The history is provided by the patient.       REVIEW OF SYSTEMS     Review of Systems   Gastrointestinal:  Positive for abdominal pain (menstrual cramping - yesterday).   Genitourinary:  Positive for vaginal bleeding.   All other systems reviewed and are negative.      PAST MEDICAL HISTORY         Diagnosis Date    Gallstones     Heterozygous factor V Leiden mutation 07/14/2025    Liver disease     Polycystic ovary disease     Pulmonary embolism (HCC)        SURGICALHISTORY     Patient  has a past surgical history that includes Tonsillectomy and adenoidectomy; cyst removal (Bilateral); Cholecystectomy, laparoscopic (N/A, 01/30/2023); Colonoscopy (N/A, 12/26/2023); Upper gastrointestinal endoscopy (N/A, 02/09/2024); Esophagogastroduodenoscopy (04/11/2025); and Upper gastrointestinal endoscopy (N/A, 4/11/2025).    CURRENT MEDICATIONS       Discharge Medication List as of 7/29/2025  1:33 PM        CONTINUE these medications which have NOT CHANGED    Details   !! ibuprofen (ADVIL;MOTRIN) 800 MG tablet Take 1 tablet by mouth every 8 hours as needed for Pain, Disp-15 tablet, R-0Normal      cyclobenzaprine (FLEXERIL) 10 MG tablet Take 1 tablet by mouth 3 times daily as needed for Muscle spasms, Disp-12 tablet, R-0Normal

## 2025-07-29 NOTE — DISCHARGE INSTRUCTIONS
Tylenol/Ibuprofen for pain    Drink plenty of water    Follow up with PCP in 3-5 days if symptoms return or worsen    Keep appointment as scheduled with GYN    Go to the ER if develop severe abdomen, increased vaginal bleeding, light headedness, dizziness

## 2025-07-31 ENCOUNTER — TELEPHONE (OUTPATIENT)
Dept: PULMONOLOGY | Age: 25
End: 2025-07-31

## 2025-07-31 NOTE — TELEPHONE ENCOUNTER
Patient called in wanting to schedule a New pulm appt. She stated that Crystal Ramirez office at [facility] had faxed over a referral two weeks ago. We do not have the referral, I called [facility] office and had to leave a voicemail for them to call our office and refax the referral.

## 2025-08-04 ENCOUNTER — HOSPITAL ENCOUNTER (EMERGENCY)
Age: 25
Discharge: HOME | End: 2025-08-04
Attending: EMERGENCY MEDICINE
Payer: COMMERCIAL

## 2025-08-04 VITALS
HEART RATE: 98 BPM | SYSTOLIC BLOOD PRESSURE: 149 MMHG | DIASTOLIC BLOOD PRESSURE: 104 MMHG | TEMPERATURE: 98.2 F | OXYGEN SATURATION: 98 % | RESPIRATION RATE: 16 BRPM

## 2025-08-04 DIAGNOSIS — M54.17 LUMBOSACRAL RADICULOPATHY: Primary | ICD-10-CM

## 2025-08-04 PROCEDURE — 99283 EMERGENCY DEPT VISIT LOW MDM: CPT

## 2025-08-04 RX ORDER — CYCLOBENZAPRINE HCL 10 MG
10 TABLET ORAL 3 TIMES DAILY PRN
Qty: 21 TABLET | Refills: 0 | Status: SHIPPED | OUTPATIENT
Start: 2025-08-04 | End: 2025-08-14

## 2025-08-04 RX ORDER — CYCLOBENZAPRINE HCL 10 MG
10 TABLET ORAL 3 TIMES DAILY PRN
Qty: 21 TABLET | Refills: 0 | Status: SHIPPED | OUTPATIENT
Start: 2025-08-04 | End: 2025-08-04

## 2025-08-04 ASSESSMENT — PAIN SCALES - GENERAL: PAINLEVEL_OUTOF10: 8

## 2025-08-04 ASSESSMENT — PAIN DESCRIPTION - LOCATION: LOCATION: BACK

## 2025-08-04 ASSESSMENT — PAIN - FUNCTIONAL ASSESSMENT: PAIN_FUNCTIONAL_ASSESSMENT: 0-10

## 2025-08-05 ENCOUNTER — TRANSCRIBE ORDERS (OUTPATIENT)
Dept: ADMINISTRATIVE | Age: 25
End: 2025-08-05

## 2025-08-05 ENCOUNTER — LAB (OUTPATIENT)
Dept: LAB | Age: 25
End: 2025-08-05

## 2025-08-05 DIAGNOSIS — N64.4 MASTODYNIA: Primary | ICD-10-CM

## 2025-08-08 ENCOUNTER — TRANSCRIBE ORDERS (OUTPATIENT)
Dept: ADMINISTRATIVE | Age: 25
End: 2025-08-08

## 2025-08-08 ENCOUNTER — HOSPITAL ENCOUNTER (OUTPATIENT)
Dept: WOMENS IMAGING | Age: 25
Discharge: HOME | End: 2025-08-08
Payer: COMMERCIAL

## 2025-08-08 VITALS — BODY MASS INDEX: 48.82 KG/M2 | WEIGHT: 293 LBS | HEIGHT: 65 IN

## 2025-08-08 DIAGNOSIS — N64.4 MASTODYNIA: ICD-10-CM

## 2025-08-08 DIAGNOSIS — M51.362 OTHER INTERVERTEBRAL DISC DEGENERATION, LUMBAR REGION WITH DISCOGENIC BACK PAIN AND LOWER EXTREMITY PAIN: Primary | ICD-10-CM

## 2025-08-08 PROCEDURE — 76642 ULTRASOUND BREAST LIMITED: CPT

## 2025-08-14 ENCOUNTER — HOSPITAL ENCOUNTER (OUTPATIENT)
Dept: GENERAL RADIOLOGY | Age: 25
Discharge: HOME | End: 2025-08-14

## 2025-08-14 ENCOUNTER — HOSPITAL ENCOUNTER (OUTPATIENT)
Dept: CT IMAGING | Age: 25
Discharge: HOME | End: 2025-08-14
Attending: RADIOLOGY

## 2025-08-14 DIAGNOSIS — Z00.6 ENCOUNTER FOR EXAMINATION FOR NORMAL COMPARISON OR CONTROL IN CLINICAL RESEARCH PROGRAM: ICD-10-CM

## 2025-08-18 ENCOUNTER — OFFICE VISIT (OUTPATIENT)
Dept: PULMONOLOGY | Age: 25
End: 2025-08-18
Payer: COMMERCIAL

## 2025-08-18 ENCOUNTER — LAB (OUTPATIENT)
Dept: LAB | Age: 25
End: 2025-08-18

## 2025-08-18 ENCOUNTER — HOSPITAL ENCOUNTER (OUTPATIENT)
Dept: MRI IMAGING | Age: 25
Discharge: HOME | End: 2025-08-18
Attending: ORTHOPAEDIC SURGERY
Payer: COMMERCIAL

## 2025-08-18 VITALS
SYSTOLIC BLOOD PRESSURE: 122 MMHG | DIASTOLIC BLOOD PRESSURE: 74 MMHG | TEMPERATURE: 98.1 F | HEART RATE: 85 BPM | OXYGEN SATURATION: 97 % | WEIGHT: 293 LBS | BODY MASS INDEX: 48.82 KG/M2 | HEIGHT: 65 IN

## 2025-08-18 DIAGNOSIS — J18.9 PNEUMONIA OF RIGHT LOWER LOBE DUE TO INFECTIOUS ORGANISM: ICD-10-CM

## 2025-08-18 DIAGNOSIS — R06.09 DYSPNEA ON EXERTION: ICD-10-CM

## 2025-08-18 DIAGNOSIS — M51.362 OTHER INTERVERTEBRAL DISC DEGENERATION, LUMBAR REGION WITH DISCOGENIC BACK PAIN AND LOWER EXTREMITY PAIN: ICD-10-CM

## 2025-08-18 DIAGNOSIS — I26.99 PULMONARY INFARCTION (HCC): Primary | ICD-10-CM

## 2025-08-18 LAB
BASOPHILS ABSOLUTE: 0 THOU/MM3 (ref 0–0.1)
BASOPHILS NFR BLD AUTO: 0.5 %
DEPRECATED RDW RBC AUTO: 43.1 FL (ref 35–45)
EOSINOPHIL NFR BLD AUTO: 1.5 %
EOSINOPHILS ABSOLUTE: 0.1 THOU/MM3 (ref 0–0.4)
ERYTHROCYTE [DISTWIDTH] IN BLOOD BY AUTOMATED COUNT: 12.7 % (ref 11.5–14.5)
HCT VFR BLD AUTO: 40.3 % (ref 37–47)
HGB BLD-MCNC: 12.4 GM/DL (ref 12–16)
IMM GRANULOCYTES # BLD AUTO: 0.02 THOU/MM3 (ref 0–0.07)
IMM GRANULOCYTES NFR BLD AUTO: 0.2 %
LYMPHOCYTES ABSOLUTE: 3 THOU/MM3 (ref 1–4.8)
LYMPHOCYTES NFR BLD AUTO: 35.8 %
MCH RBC QN AUTO: 28.4 PG (ref 26–33)
MCHC RBC AUTO-ENTMCNC: 30.8 GM/DL (ref 32.2–35.5)
MCV RBC AUTO: 92.2 FL (ref 81–99)
MONOCYTES ABSOLUTE: 0.7 THOU/MM3 (ref 0.4–1.3)
MONOCYTES NFR BLD AUTO: 8.8 %
NEUTROPHILS ABSOLUTE: 4.5 THOU/MM3 (ref 1.8–7.7)
NEUTROPHILS NFR BLD AUTO: 53.2 %
NRBC BLD AUTO-RTO: 0 /100 WBC
PLATELET # BLD AUTO: 353 THOU/MM3 (ref 130–400)
PMV BLD AUTO: 9.8 FL (ref 9.4–12.4)
RBC # BLD AUTO: 4.37 MILL/MM3 (ref 4.2–5.4)
WBC # BLD AUTO: 8.4 THOU/MM3 (ref 4.8–10.8)

## 2025-08-18 PROCEDURE — 99204 OFFICE O/P NEW MOD 45 MIN: CPT | Performed by: INTERNAL MEDICINE

## 2025-08-18 PROCEDURE — 72148 MRI LUMBAR SPINE W/O DYE: CPT

## 2025-08-18 PROCEDURE — G8417 CALC BMI ABV UP PARAM F/U: HCPCS | Performed by: INTERNAL MEDICINE

## 2025-08-18 PROCEDURE — G8427 DOCREV CUR MEDS BY ELIG CLIN: HCPCS | Performed by: INTERNAL MEDICINE

## 2025-08-18 PROCEDURE — 1036F TOBACCO NON-USER: CPT | Performed by: INTERNAL MEDICINE

## 2025-08-18 RX ORDER — METRONIDAZOLE 500 MG/1
250 TABLET ORAL 3 TIMES DAILY
COMMUNITY
Start: 2025-05-16

## 2025-08-18 RX ORDER — TRAMADOL HYDROCHLORIDE 50 MG/1
TABLET ORAL
COMMUNITY
Start: 2025-08-07

## 2025-08-23 ENCOUNTER — HOSPITAL ENCOUNTER (OUTPATIENT)
Dept: CT IMAGING | Age: 25
Discharge: HOME | End: 2025-08-23
Attending: INTERNAL MEDICINE
Payer: COMMERCIAL

## 2025-08-23 DIAGNOSIS — I26.99 PULMONARY INFARCTION (HCC): ICD-10-CM

## 2025-08-23 DIAGNOSIS — J18.9 PNEUMONIA OF RIGHT LOWER LOBE DUE TO INFECTIOUS ORGANISM: ICD-10-CM

## 2025-08-23 DIAGNOSIS — R06.09 DYSPNEA ON EXERTION: ICD-10-CM

## 2025-08-23 PROCEDURE — 6360000004 HC RX CONTRAST MEDICATION: Performed by: INTERNAL MEDICINE

## 2025-08-23 PROCEDURE — 71260 CT THORAX DX C+: CPT

## 2025-08-23 RX ORDER — IOPAMIDOL 755 MG/ML
80 INJECTION, SOLUTION INTRAVASCULAR
Status: COMPLETED | OUTPATIENT
Start: 2025-08-23 | End: 2025-08-23

## 2025-08-23 RX ADMIN — IOPAMIDOL 80 ML: 755 INJECTION, SOLUTION INTRAVENOUS at 10:50

## 2025-08-25 LAB — CYTOLOGY THIN PREP PAP: NORMAL

## 2025-08-29 ENCOUNTER — OFFICE VISIT (OUTPATIENT)
Dept: PULMONOLOGY | Age: 25
End: 2025-08-29
Payer: COMMERCIAL

## 2025-08-29 VITALS
DIASTOLIC BLOOD PRESSURE: 82 MMHG | OXYGEN SATURATION: 97 % | WEIGHT: 293 LBS | TEMPERATURE: 97.5 F | SYSTOLIC BLOOD PRESSURE: 126 MMHG | BODY MASS INDEX: 48.82 KG/M2 | HEART RATE: 89 BPM | HEIGHT: 65 IN

## 2025-08-29 DIAGNOSIS — I26.99 PULMONARY INFARCTION (HCC): Primary | ICD-10-CM

## 2025-08-29 DIAGNOSIS — R06.02 SHORTNESS OF BREATH: ICD-10-CM

## 2025-08-29 DIAGNOSIS — I26.93 SINGLE SUBSEGMENTAL PULMONARY EMBOLISM WITHOUT ACUTE COR PULMONALE (HCC): ICD-10-CM

## 2025-08-29 PROCEDURE — G8427 DOCREV CUR MEDS BY ELIG CLIN: HCPCS | Performed by: INTERNAL MEDICINE

## 2025-08-29 PROCEDURE — G8417 CALC BMI ABV UP PARAM F/U: HCPCS | Performed by: INTERNAL MEDICINE

## 2025-08-29 PROCEDURE — 99214 OFFICE O/P EST MOD 30 MIN: CPT | Performed by: INTERNAL MEDICINE

## 2025-08-29 PROCEDURE — 1036F TOBACCO NON-USER: CPT | Performed by: INTERNAL MEDICINE

## (undated) DEVICE — TUBING INSUFFLATION SMK EVAC HI FLO SET PNEUMOCLEAR

## (undated) DEVICE — BIOGUARD A/W CLEANING ADAPTER

## (undated) DEVICE — UNIVERSAL MONOPOLAR LAPAROSCOPIC CABLE 10FT, 4MM PIN CONNECTOR: Brand: CONMED

## (undated) DEVICE — TROCAR: Brand: KII SHIELDED BLADED ACCESS SYSTEM

## (undated) DEVICE — BANDAGE ADH W1XL3IN NAT FAB WVN FLX DURABLE N ADH PD SEAL

## (undated) DEVICE — TROCAR: Brand: KII® SLEEVE

## (undated) DEVICE — INSUFFLATION NEEDLE TO ESTABLISH PNEUMOPERITONEUM.: Brand: INSUFFLATION NEEDLE

## (undated) DEVICE — GLOVE SURG SZ 7.5 L11.73IN FNGR THK9.8MIL STRW LTX POLYMER

## (undated) DEVICE — GLOVE ORTHO 8   MSG9480

## (undated) DEVICE — PUMP SUC IRR TBNG L10FT W/ HNDPC ASSEMB STRYKEFLOW 2

## (undated) DEVICE — APPLIER CLP M L L11.4IN DIA10MM ENDOSCP ROT MULT FOR LIG

## (undated) DEVICE — GENERAL LAPAROSCOPY-LF: Brand: MEDLINE INDUSTRIES, INC.

## (undated) DEVICE — BAG RETRIEVAL SPECIMEN SUPERBAG 5 SMALL NYLON ITRODUCER

## (undated) DEVICE — TROCAR: Brand: KII FIOS FIRST ENTRY